# Patient Record
Sex: FEMALE | Race: WHITE | Employment: OTHER | URBAN - METROPOLITAN AREA
[De-identification: names, ages, dates, MRNs, and addresses within clinical notes are randomized per-mention and may not be internally consistent; named-entity substitution may affect disease eponyms.]

---

## 2019-09-23 ENCOUNTER — TRANSFERRED RECORDS (OUTPATIENT)
Dept: HEALTH INFORMATION MANAGEMENT | Facility: CLINIC | Age: 44
End: 2019-09-23

## 2019-09-30 ENCOUNTER — HOSPITAL ENCOUNTER (INPATIENT)
Facility: CLINIC | Age: 44
LOS: 2 days | Discharge: HOME | DRG: 025 | End: 2019-10-03
Attending: NEUROLOGICAL SURGERY | Admitting: NEUROLOGICAL SURGERY

## 2019-09-30 ENCOUNTER — TRANSFERRED RECORDS (OUTPATIENT)
Dept: HEALTH INFORMATION MANAGEMENT | Facility: CLINIC | Age: 44
End: 2019-09-30

## 2019-09-30 DIAGNOSIS — D49.6 BRAIN TUMOR (H): Primary | ICD-10-CM

## 2019-09-30 LAB
ALT SERPL-CCNC: 10 U/L
AST SERPL-CCNC: 16 U/L (ref 14–36)
CREAT SERPL-MCNC: 0.77 MG/DL (ref 0.52–1.04)
GFR SERPL CREATININE-BSD FRML MDRD: >60 ML/MIN/1.73M2
GLUCOSE BLDC GLUCOMTR-MCNC: 133 MG/DL (ref 70–99)
GLUCOSE SERPL-MCNC: 109 MG/DL (ref 75–100)
INR PPP: 1.1 (ref 0.9–1.1)
MRSA DNA SPEC QL NAA+PROBE: NEGATIVE
POTASSIUM SERPL-SCNC: 3.9 MMOL/L (ref 3.5–5.1)
SPECIMEN SOURCE: NORMAL
TSH SERPL-ACNC: 1.38 UIU/ML (ref 0.47–4.68)

## 2019-09-30 PROCEDURE — 87640 STAPH A DNA AMP PROBE: CPT | Performed by: NEUROLOGICAL SURGERY

## 2019-09-30 PROCEDURE — 93010 ELECTROCARDIOGRAM REPORT: CPT | Performed by: INTERNAL MEDICINE

## 2019-09-30 PROCEDURE — 00000146 ZZHCL STATISTIC GLUCOSE BY METER IP

## 2019-09-30 PROCEDURE — 93005 ELECTROCARDIOGRAM TRACING: CPT

## 2019-09-30 PROCEDURE — 87641 MR-STAPH DNA AMP PROBE: CPT | Performed by: NEUROLOGICAL SURGERY

## 2019-09-30 ASSESSMENT — PAIN DESCRIPTION - DESCRIPTORS: DESCRIPTORS: HEADACHE

## 2019-09-30 ASSESSMENT — ACTIVITIES OF DAILY LIVING (ADL)
BATHING: 0-->INDEPENDENT
DRESS: 0-->INDEPENDENT
SWALLOWING: 0-->SWALLOWS FOODS/LIQUIDS WITHOUT DIFFICULTY
RETIRED_COMMUNICATION: 0-->UNDERSTANDS/COMMUNICATES WITHOUT DIFFICULTY
TOILETING: 0-->INDEPENDENT
COGNITION: 0 - NO COGNITION ISSUES REPORTED
FALL_HISTORY_WITHIN_LAST_SIX_MONTHS: YES
RETIRED_EATING: 0-->INDEPENDENT
AMBULATION: 0-->INDEPENDENT
NUMBER_OF_TIMES_PATIENT_HAS_FALLEN_WITHIN_LAST_SIX_MONTHS: 3
TRANSFERRING: 0-->INDEPENDENT

## 2019-10-01 ENCOUNTER — APPOINTMENT (OUTPATIENT)
Dept: CT IMAGING | Facility: CLINIC | Age: 44
DRG: 025 | End: 2019-10-01
Attending: NEUROLOGICAL SURGERY

## 2019-10-01 ENCOUNTER — ANESTHESIA (OUTPATIENT)
Dept: SURGERY | Facility: CLINIC | Age: 44
DRG: 025 | End: 2019-10-01

## 2019-10-01 ENCOUNTER — ANESTHESIA EVENT (OUTPATIENT)
Dept: SURGERY | Facility: CLINIC | Age: 44
DRG: 025 | End: 2019-10-01

## 2019-10-01 ENCOUNTER — APPOINTMENT (OUTPATIENT)
Dept: MRI IMAGING | Facility: CLINIC | Age: 44
DRG: 025 | End: 2019-10-01
Attending: NEUROLOGICAL SURGERY

## 2019-10-01 PROBLEM — D49.6 BRAIN TUMOR (H): Status: ACTIVE | Noted: 2019-10-01

## 2019-10-01 LAB
ABO + RH BLD: NORMAL
ABO + RH BLD: NORMAL
ALBUMIN SERPL-MCNC: 3.6 G/DL (ref 3.4–5)
ALP SERPL-CCNC: 72 U/L (ref 40–150)
ALT SERPL W P-5'-P-CCNC: 11 U/L (ref 0–50)
ANION GAP SERPL CALCULATED.3IONS-SCNC: 12 MMOL/L (ref 3–14)
APTT PPP: 27 SEC (ref 22–37)
AST SERPL W P-5'-P-CCNC: 7 U/L (ref 0–45)
BILIRUB SERPL-MCNC: 0.4 MG/DL (ref 0.2–1.3)
BLD GP AB SCN SERPL QL: NORMAL
BLOOD BANK CMNT PATIENT-IMP: NORMAL
BUN SERPL-MCNC: 17 MG/DL (ref 7–30)
CALCIUM SERPL-MCNC: 10.1 MG/DL (ref 8.5–10.1)
CHLORIDE SERPL-SCNC: 107 MMOL/L (ref 94–109)
CO2 SERPL-SCNC: 20 MMOL/L (ref 20–32)
CREAT SERPL-MCNC: 0.64 MG/DL (ref 0.52–1.04)
ERYTHROCYTE [DISTWIDTH] IN BLOOD BY AUTOMATED COUNT: 14.7 % (ref 10–15)
FIBRINOGEN PPP-MCNC: 352 MG/DL (ref 200–420)
GFR SERPL CREATININE-BSD FRML MDRD: >90 ML/MIN/{1.73_M2}
GLUCOSE BLDC GLUCOMTR-MCNC: 122 MG/DL (ref 70–99)
GLUCOSE SERPL-MCNC: 109 MG/DL (ref 70–99)
HCG UR QL: NEGATIVE
HCT VFR BLD AUTO: 36 % (ref 35–47)
HGB BLD-MCNC: 11.6 G/DL (ref 11.7–15.7)
INR PPP: 1.15 (ref 0.86–1.14)
INTERPRETATION ECG - MUSE: NORMAL
MCH RBC QN AUTO: 26.2 PG (ref 26.5–33)
MCHC RBC AUTO-ENTMCNC: 32.2 G/DL (ref 31.5–36.5)
MCV RBC AUTO: 81 FL (ref 78–100)
PLATELET # BLD AUTO: 274 10E9/L (ref 150–450)
POTASSIUM SERPL-SCNC: 4.2 MMOL/L (ref 3.4–5.3)
PROT SERPL-MCNC: 7.4 G/DL (ref 6.8–8.8)
RBC # BLD AUTO: 4.43 10E12/L (ref 3.8–5.2)
SODIUM SERPL-SCNC: 139 MMOL/L (ref 133–144)
SPECIMEN EXP DATE BLD: NORMAL
WBC # BLD AUTO: 8.5 10E9/L (ref 4–11)

## 2019-10-01 PROCEDURE — 85610 PROTHROMBIN TIME: CPT | Performed by: STUDENT IN AN ORGANIZED HEALTH CARE EDUCATION/TRAINING PROGRAM

## 2019-10-01 PROCEDURE — 40000014 ZZH STATISTIC ARTERIAL MONITORING DAILY

## 2019-10-01 PROCEDURE — 25000565 ZZH ISOFLURANE, EA 15 MIN: Performed by: NEUROLOGICAL SURGERY

## 2019-10-01 PROCEDURE — C9113 INJ PANTOPRAZOLE SODIUM, VIA: HCPCS | Performed by: STUDENT IN AN ORGANIZED HEALTH CARE EDUCATION/TRAINING PROGRAM

## 2019-10-01 PROCEDURE — 25800030 ZZH RX IP 258 OP 636: Performed by: NURSE ANESTHETIST, CERTIFIED REGISTERED

## 2019-10-01 PROCEDURE — A9585 GADOBUTROL INJECTION: HCPCS | Performed by: NEUROLOGICAL SURGERY

## 2019-10-01 PROCEDURE — 86850 RBC ANTIBODY SCREEN: CPT | Performed by: STUDENT IN AN ORGANIZED HEALTH CARE EDUCATION/TRAINING PROGRAM

## 2019-10-01 PROCEDURE — 85027 COMPLETE CBC AUTOMATED: CPT | Performed by: STUDENT IN AN ORGANIZED HEALTH CARE EDUCATION/TRAINING PROGRAM

## 2019-10-01 PROCEDURE — 00B00ZZ EXCISION OF BRAIN, OPEN APPROACH: ICD-10-PCS | Performed by: NEUROLOGICAL SURGERY

## 2019-10-01 PROCEDURE — 86901 BLOOD TYPING SEROLOGIC RH(D): CPT | Performed by: STUDENT IN AN ORGANIZED HEALTH CARE EDUCATION/TRAINING PROGRAM

## 2019-10-01 PROCEDURE — 25000125 ZZHC RX 250: Performed by: STUDENT IN AN ORGANIZED HEALTH CARE EDUCATION/TRAINING PROGRAM

## 2019-10-01 PROCEDURE — 25000125 ZZHC RX 250: Performed by: NURSE ANESTHETIST, CERTIFIED REGISTERED

## 2019-10-01 PROCEDURE — 37000009 ZZH ANESTHESIA TECHNICAL FEE, EACH ADDTL 15 MIN: Performed by: NEUROLOGICAL SURGERY

## 2019-10-01 PROCEDURE — 37000008 ZZH ANESTHESIA TECHNICAL FEE, 1ST 30 MIN: Performed by: NEUROLOGICAL SURGERY

## 2019-10-01 PROCEDURE — 88307 TISSUE EXAM BY PATHOLOGIST: CPT | Performed by: NEUROLOGICAL SURGERY

## 2019-10-01 PROCEDURE — 71000016 ZZH RECOVERY PHASE 1 LEVEL 3 FIRST HR: Performed by: NEUROLOGICAL SURGERY

## 2019-10-01 PROCEDURE — 85730 THROMBOPLASTIN TIME PARTIAL: CPT | Performed by: STUDENT IN AN ORGANIZED HEALTH CARE EDUCATION/TRAINING PROGRAM

## 2019-10-01 PROCEDURE — 71000017 ZZH RECOVERY PHASE 1 LEVEL 3 EA ADDTL HR: Performed by: NEUROLOGICAL SURGERY

## 2019-10-01 PROCEDURE — 25000128 H RX IP 250 OP 636: Performed by: NURSE ANESTHETIST, CERTIFIED REGISTERED

## 2019-10-01 PROCEDURE — 25000132 ZZH RX MED GY IP 250 OP 250 PS 637: Performed by: NURSE ANESTHETIST, CERTIFIED REGISTERED

## 2019-10-01 PROCEDURE — 36000074 ZZH SURGERY LEVEL 6 1ST 30 MIN - UMMC: Performed by: NEUROLOGICAL SURGERY

## 2019-10-01 PROCEDURE — 20000004 ZZH R&B ICU UMMC

## 2019-10-01 PROCEDURE — 25800030 ZZH RX IP 258 OP 636: Performed by: ANESTHESIOLOGY

## 2019-10-01 PROCEDURE — 25800030 ZZH RX IP 258 OP 636: Performed by: STUDENT IN AN ORGANIZED HEALTH CARE EDUCATION/TRAINING PROGRAM

## 2019-10-01 PROCEDURE — 25000128 H RX IP 250 OP 636: Performed by: NEUROLOGICAL SURGERY

## 2019-10-01 PROCEDURE — 27210995 ZZH RX 272: Performed by: NEUROLOGICAL SURGERY

## 2019-10-01 PROCEDURE — 81025 URINE PREGNANCY TEST: CPT | Performed by: STUDENT IN AN ORGANIZED HEALTH CARE EDUCATION/TRAINING PROGRAM

## 2019-10-01 PROCEDURE — 40000556 ZZH STATISTIC PERIPHERAL IV START W US GUIDANCE

## 2019-10-01 PROCEDURE — 25000128 H RX IP 250 OP 636: Performed by: STUDENT IN AN ORGANIZED HEALTH CARE EDUCATION/TRAINING PROGRAM

## 2019-10-01 PROCEDURE — 27810169 ZZH OR IMPLANT GENERAL: Performed by: NEUROLOGICAL SURGERY

## 2019-10-01 PROCEDURE — 74177 CT ABD & PELVIS W/CONTRAST: CPT

## 2019-10-01 PROCEDURE — 70450 CT HEAD/BRAIN W/O DYE: CPT

## 2019-10-01 PROCEDURE — 88342 IMHCHEM/IMCYTCHM 1ST ANTB: CPT | Performed by: NEUROLOGICAL SURGERY

## 2019-10-01 PROCEDURE — 00000146 ZZHCL STATISTIC GLUCOSE BY METER IP

## 2019-10-01 PROCEDURE — 25800030 ZZH RX IP 258 OP 636: Performed by: NEUROLOGICAL SURGERY

## 2019-10-01 PROCEDURE — 00000159 ZZHCL STATISTIC H-SEND OUTS PREP: Performed by: NEUROLOGICAL SURGERY

## 2019-10-01 PROCEDURE — 25000125 ZZHC RX 250: Performed by: NEUROLOGICAL SURGERY

## 2019-10-01 PROCEDURE — 36000076 ZZH SURGERY LEVEL 6 EA 15 ADDTL MIN - UMMC: Performed by: NEUROLOGICAL SURGERY

## 2019-10-01 PROCEDURE — 25000128 H RX IP 250 OP 636: Performed by: ANESTHESIOLOGY

## 2019-10-01 PROCEDURE — 85384 FIBRINOGEN ACTIVITY: CPT | Performed by: STUDENT IN AN ORGANIZED HEALTH CARE EDUCATION/TRAINING PROGRAM

## 2019-10-01 PROCEDURE — 00900ZZ DRAINAGE OF BRAIN, OPEN APPROACH: ICD-10-PCS | Performed by: NEUROLOGICAL SURGERY

## 2019-10-01 PROCEDURE — C1713 ANCHOR/SCREW BN/BN,TIS/BN: HCPCS | Performed by: NEUROLOGICAL SURGERY

## 2019-10-01 PROCEDURE — 27210794 ZZH OR GENERAL SUPPLY STERILE: Performed by: NEUROLOGICAL SURGERY

## 2019-10-01 PROCEDURE — 71260 CT THORAX DX C+: CPT

## 2019-10-01 PROCEDURE — 25500064 ZZH RX 255 OP 636: Performed by: NEUROLOGICAL SURGERY

## 2019-10-01 PROCEDURE — 88341 IMHCHEM/IMCYTCHM EA ADD ANTB: CPT | Performed by: NEUROLOGICAL SURGERY

## 2019-10-01 PROCEDURE — 40000170 ZZH STATISTIC PRE-PROCEDURE ASSESSMENT II: Performed by: NEUROLOGICAL SURGERY

## 2019-10-01 PROCEDURE — 88331 PATH CONSLTJ SURG 1 BLK 1SPC: CPT | Performed by: NEUROLOGICAL SURGERY

## 2019-10-01 PROCEDURE — 86900 BLOOD TYPING SEROLOGIC ABO: CPT | Performed by: STUDENT IN AN ORGANIZED HEALTH CARE EDUCATION/TRAINING PROGRAM

## 2019-10-01 PROCEDURE — 70552 MRI BRAIN STEM W/DYE: CPT

## 2019-10-01 PROCEDURE — 36415 COLL VENOUS BLD VENIPUNCTURE: CPT | Performed by: STUDENT IN AN ORGANIZED HEALTH CARE EDUCATION/TRAINING PROGRAM

## 2019-10-01 PROCEDURE — 80053 COMPREHEN METABOLIC PANEL: CPT | Performed by: STUDENT IN AN ORGANIZED HEALTH CARE EDUCATION/TRAINING PROGRAM

## 2019-10-01 PROCEDURE — C1763 CONN TISS, NON-HUMAN: HCPCS | Performed by: NEUROLOGICAL SURGERY

## 2019-10-01 PROCEDURE — 40000275 ZZH STATISTIC RCP TIME EA 10 MIN

## 2019-10-01 DEVICE — IMP PLATE SYN STR DOG BONE LOW PROFILE 2H TI 421.502: Type: IMPLANTABLE DEVICE | Site: BRAIN | Status: FUNCTIONAL

## 2019-10-01 DEVICE — GRAFT DURAGEN 3X3" ID330: Type: IMPLANTABLE DEVICE | Site: BRAIN | Status: FUNCTIONAL

## 2019-10-01 DEVICE — IMP BUR HOLE COVER 24MM LOW PROFILE TI 421.528: Type: IMPLANTABLE DEVICE | Site: BRAIN | Status: FUNCTIONAL

## 2019-10-01 DEVICE — IMP SCR SYN MATRIX LOW PRO 1.5X04MM SELF DRILL 04.503.104.01: Type: IMPLANTABLE DEVICE | Site: BRAIN | Status: FUNCTIONAL

## 2019-10-01 RX ORDER — METOCLOPRAMIDE 5 MG/1
10 TABLET ORAL EVERY 6 HOURS PRN
Status: DISCONTINUED | OUTPATIENT
Start: 2019-10-01 | End: 2019-10-03 | Stop reason: HOSPADM

## 2019-10-01 RX ORDER — AMOXICILLIN 250 MG
2 CAPSULE ORAL 2 TIMES DAILY
Status: DISCONTINUED | OUTPATIENT
Start: 2019-10-01 | End: 2019-10-03 | Stop reason: HOSPADM

## 2019-10-01 RX ORDER — SODIUM CHLORIDE, SODIUM LACTATE, POTASSIUM CHLORIDE, CALCIUM CHLORIDE 600; 310; 30; 20 MG/100ML; MG/100ML; MG/100ML; MG/100ML
INJECTION, SOLUTION INTRAVENOUS CONTINUOUS PRN
Status: DISCONTINUED | OUTPATIENT
Start: 2019-10-01 | End: 2019-10-01

## 2019-10-01 RX ORDER — ACETAMINOPHEN 325 MG/1
975 TABLET ORAL EVERY 8 HOURS
Status: DISCONTINUED | OUTPATIENT
Start: 2019-10-01 | End: 2019-10-03 | Stop reason: HOSPADM

## 2019-10-01 RX ORDER — HYDRALAZINE HYDROCHLORIDE 20 MG/ML
10-20 INJECTION INTRAMUSCULAR; INTRAVENOUS EVERY 30 MIN PRN
Status: DISCONTINUED | OUTPATIENT
Start: 2019-10-01 | End: 2019-10-02

## 2019-10-01 RX ORDER — PROCHLORPERAZINE 25 MG
25 SUPPOSITORY, RECTAL RECTAL EVERY 12 HOURS PRN
Status: DISCONTINUED | OUTPATIENT
Start: 2019-10-01 | End: 2019-10-03 | Stop reason: HOSPADM

## 2019-10-01 RX ORDER — FENTANYL CITRATE 50 UG/ML
25-50 INJECTION, SOLUTION INTRAMUSCULAR; INTRAVENOUS EVERY 5 MIN PRN
Status: DISCONTINUED | OUTPATIENT
Start: 2019-10-01 | End: 2019-10-02 | Stop reason: HOSPADM

## 2019-10-01 RX ORDER — CEFAZOLIN SODIUM 1 G/3ML
INJECTION, POWDER, FOR SOLUTION INTRAMUSCULAR; INTRAVENOUS PRN
Status: DISCONTINUED | OUTPATIENT
Start: 2019-10-01 | End: 2019-10-01

## 2019-10-01 RX ORDER — DEXAMETHASONE SODIUM PHOSPHATE 4 MG/ML
4 INJECTION, SOLUTION INTRA-ARTICULAR; INTRALESIONAL; INTRAMUSCULAR; INTRAVENOUS; SOFT TISSUE EVERY 8 HOURS
Status: DISCONTINUED | OUTPATIENT
Start: 2019-10-01 | End: 2019-10-03 | Stop reason: HOSPADM

## 2019-10-01 RX ORDER — ONDANSETRON 4 MG/1
4 TABLET, ORALLY DISINTEGRATING ORAL EVERY 30 MIN PRN
Status: DISCONTINUED | OUTPATIENT
Start: 2019-10-01 | End: 2019-10-02 | Stop reason: HOSPADM

## 2019-10-01 RX ORDER — POTASSIUM CHLORIDE 29.8 MG/ML
20 INJECTION INTRAVENOUS
Status: DISCONTINUED | OUTPATIENT
Start: 2019-10-01 | End: 2019-10-03 | Stop reason: HOSPADM

## 2019-10-01 RX ORDER — ONDANSETRON 4 MG/1
4 TABLET, ORALLY DISINTEGRATING ORAL EVERY 6 HOURS PRN
Status: DISCONTINUED | OUTPATIENT
Start: 2019-10-01 | End: 2019-10-03 | Stop reason: HOSPADM

## 2019-10-01 RX ORDER — ONDANSETRON 2 MG/ML
4 INJECTION INTRAMUSCULAR; INTRAVENOUS EVERY 6 HOURS PRN
Status: DISCONTINUED | OUTPATIENT
Start: 2019-10-01 | End: 2019-10-03 | Stop reason: HOSPADM

## 2019-10-01 RX ORDER — POTASSIUM CL/LIDO/0.9 % NACL 10MEQ/0.1L
10 INTRAVENOUS SOLUTION, PIGGYBACK (ML) INTRAVENOUS
Status: DISCONTINUED | OUTPATIENT
Start: 2019-10-01 | End: 2019-10-03 | Stop reason: HOSPADM

## 2019-10-01 RX ORDER — AMOXICILLIN 250 MG
1 CAPSULE ORAL 2 TIMES DAILY
Status: DISCONTINUED | OUTPATIENT
Start: 2019-10-01 | End: 2019-10-03 | Stop reason: HOSPADM

## 2019-10-01 RX ORDER — SODIUM CHLORIDE 9 MG/ML
INJECTION, SOLUTION INTRAVENOUS CONTINUOUS
Status: ACTIVE | OUTPATIENT
Start: 2019-10-01 | End: 2019-10-01

## 2019-10-01 RX ORDER — ESMOLOL HYDROCHLORIDE 10 MG/ML
INJECTION INTRAVENOUS PRN
Status: DISCONTINUED | OUTPATIENT
Start: 2019-10-01 | End: 2019-10-01

## 2019-10-01 RX ORDER — LABETALOL 20 MG/4 ML (5 MG/ML) INTRAVENOUS SYRINGE
10-40 EVERY 10 MIN PRN
Status: DISCONTINUED | OUTPATIENT
Start: 2019-10-01 | End: 2019-10-02

## 2019-10-01 RX ORDER — FENTANYL CITRATE 50 UG/ML
INJECTION, SOLUTION INTRAMUSCULAR; INTRAVENOUS PRN
Status: DISCONTINUED | OUTPATIENT
Start: 2019-10-01 | End: 2019-10-01

## 2019-10-01 RX ORDER — IOPAMIDOL 755 MG/ML
88 INJECTION, SOLUTION INTRAVASCULAR ONCE
Status: COMPLETED | OUTPATIENT
Start: 2019-10-01 | End: 2019-10-01

## 2019-10-01 RX ORDER — ACETAMINOPHEN 325 MG/1
650 TABLET ORAL EVERY 4 HOURS PRN
Status: DISCONTINUED | OUTPATIENT
Start: 2019-10-04 | End: 2019-10-03 | Stop reason: HOSPADM

## 2019-10-01 RX ORDER — OXYCODONE HYDROCHLORIDE 5 MG/1
5-10 TABLET ORAL
Status: DISCONTINUED | OUTPATIENT
Start: 2019-10-01 | End: 2019-10-03 | Stop reason: HOSPADM

## 2019-10-01 RX ORDER — LIDOCAINE 40 MG/G
CREAM TOPICAL
Status: DISCONTINUED | OUTPATIENT
Start: 2019-10-01 | End: 2019-10-03 | Stop reason: HOSPADM

## 2019-10-01 RX ORDER — MAGNESIUM SULFATE HEPTAHYDRATE 40 MG/ML
4 INJECTION, SOLUTION INTRAVENOUS EVERY 4 HOURS PRN
Status: DISCONTINUED | OUTPATIENT
Start: 2019-10-01 | End: 2019-10-03 | Stop reason: HOSPADM

## 2019-10-01 RX ORDER — HYDROMORPHONE HYDROCHLORIDE 1 MG/ML
.3-.5 INJECTION, SOLUTION INTRAMUSCULAR; INTRAVENOUS; SUBCUTANEOUS EVERY 10 MIN PRN
Status: DISCONTINUED | OUTPATIENT
Start: 2019-10-01 | End: 2019-10-02 | Stop reason: HOSPADM

## 2019-10-01 RX ORDER — MEPERIDINE HYDROCHLORIDE 25 MG/ML
12.5 INJECTION INTRAMUSCULAR; INTRAVENOUS; SUBCUTANEOUS
Status: DISCONTINUED | OUTPATIENT
Start: 2019-10-01 | End: 2019-10-02 | Stop reason: HOSPADM

## 2019-10-01 RX ORDER — NALOXONE HYDROCHLORIDE 0.4 MG/ML
.1-.4 INJECTION, SOLUTION INTRAMUSCULAR; INTRAVENOUS; SUBCUTANEOUS
Status: DISCONTINUED | OUTPATIENT
Start: 2019-10-01 | End: 2019-10-02 | Stop reason: HOSPADM

## 2019-10-01 RX ORDER — MANNITOL 20 G/100ML
INJECTION, SOLUTION INTRAVENOUS PRN
Status: DISCONTINUED | OUTPATIENT
Start: 2019-10-01 | End: 2019-10-01

## 2019-10-01 RX ORDER — POTASSIUM CHLORIDE 1.5 G/1.58G
20-40 POWDER, FOR SOLUTION ORAL
Status: DISCONTINUED | OUTPATIENT
Start: 2019-10-01 | End: 2019-10-03 | Stop reason: HOSPADM

## 2019-10-01 RX ORDER — METOCLOPRAMIDE HYDROCHLORIDE 5 MG/ML
10 INJECTION INTRAMUSCULAR; INTRAVENOUS EVERY 6 HOURS PRN
Status: DISCONTINUED | OUTPATIENT
Start: 2019-10-01 | End: 2019-10-03 | Stop reason: HOSPADM

## 2019-10-01 RX ORDER — LEVETIRACETAM 10 MG/ML
1000 INJECTION INTRAVASCULAR ONCE
Status: COMPLETED | OUTPATIENT
Start: 2019-10-01 | End: 2019-10-01

## 2019-10-01 RX ORDER — ONDANSETRON 2 MG/ML
4 INJECTION INTRAMUSCULAR; INTRAVENOUS EVERY 30 MIN PRN
Status: DISCONTINUED | OUTPATIENT
Start: 2019-10-01 | End: 2019-10-02 | Stop reason: HOSPADM

## 2019-10-01 RX ORDER — CEFAZOLIN SODIUM 2 G/100ML
2 INJECTION, SOLUTION INTRAVENOUS
Status: COMPLETED | OUTPATIENT
Start: 2019-10-01 | End: 2019-10-01

## 2019-10-01 RX ORDER — GLYCOPYRROLATE 0.2 MG/ML
INJECTION, SOLUTION INTRAMUSCULAR; INTRAVENOUS PRN
Status: DISCONTINUED | OUTPATIENT
Start: 2019-10-01 | End: 2019-10-01

## 2019-10-01 RX ORDER — EPHEDRINE SULFATE 50 MG/ML
INJECTION, SOLUTION INTRAMUSCULAR; INTRAVENOUS; SUBCUTANEOUS PRN
Status: DISCONTINUED | OUTPATIENT
Start: 2019-10-01 | End: 2019-10-01

## 2019-10-01 RX ORDER — PROPOFOL 10 MG/ML
INJECTION, EMULSION INTRAVENOUS PRN
Status: DISCONTINUED | OUTPATIENT
Start: 2019-10-01 | End: 2019-10-01

## 2019-10-01 RX ORDER — LIDOCAINE HYDROCHLORIDE 20 MG/ML
INJECTION, SOLUTION INFILTRATION; PERINEURAL PRN
Status: DISCONTINUED | OUTPATIENT
Start: 2019-10-01 | End: 2019-10-01

## 2019-10-01 RX ORDER — POTASSIUM CHLORIDE 7.45 MG/ML
10 INJECTION INTRAVENOUS
Status: DISCONTINUED | OUTPATIENT
Start: 2019-10-01 | End: 2019-10-03 | Stop reason: HOSPADM

## 2019-10-01 RX ORDER — GADOBUTROL 604.72 MG/ML
7.5 INJECTION INTRAVENOUS ONCE
Status: COMPLETED | OUTPATIENT
Start: 2019-10-01 | End: 2019-10-01

## 2019-10-01 RX ORDER — NALOXONE HYDROCHLORIDE 0.4 MG/ML
.1-.4 INJECTION, SOLUTION INTRAMUSCULAR; INTRAVENOUS; SUBCUTANEOUS
Status: DISCONTINUED | OUTPATIENT
Start: 2019-10-01 | End: 2019-10-03 | Stop reason: HOSPADM

## 2019-10-01 RX ORDER — SODIUM CHLORIDE 9 MG/ML
INJECTION, SOLUTION INTRAVENOUS CONTINUOUS
Status: DISCONTINUED | OUTPATIENT
Start: 2019-10-01 | End: 2019-10-03 | Stop reason: HOSPADM

## 2019-10-01 RX ORDER — SODIUM CHLORIDE, SODIUM LACTATE, POTASSIUM CHLORIDE, CALCIUM CHLORIDE 600; 310; 30; 20 MG/100ML; MG/100ML; MG/100ML; MG/100ML
INJECTION, SOLUTION INTRAVENOUS CONTINUOUS
Status: DISCONTINUED | OUTPATIENT
Start: 2019-10-01 | End: 2019-10-01

## 2019-10-01 RX ORDER — BUPIVACAINE HYDROCHLORIDE AND EPINEPHRINE 2.5; 5 MG/ML; UG/ML
INJECTION, SOLUTION INFILTRATION; PERINEURAL PRN
Status: DISCONTINUED | OUTPATIENT
Start: 2019-10-01 | End: 2019-10-02 | Stop reason: HOSPADM

## 2019-10-01 RX ORDER — DEXAMETHASONE SODIUM PHOSPHATE 4 MG/ML
INJECTION, SOLUTION INTRA-ARTICULAR; INTRALESIONAL; INTRAMUSCULAR; INTRAVENOUS; SOFT TISSUE PRN
Status: DISCONTINUED | OUTPATIENT
Start: 2019-10-01 | End: 2019-10-01

## 2019-10-01 RX ORDER — DEXAMETHASONE 4 MG/1
4 TABLET ORAL EVERY 8 HOURS
Status: DISCONTINUED | OUTPATIENT
Start: 2019-10-01 | End: 2019-10-03 | Stop reason: HOSPADM

## 2019-10-01 RX ORDER — CEFAZOLIN SODIUM 1 G/3ML
1 INJECTION, POWDER, FOR SOLUTION INTRAMUSCULAR; INTRAVENOUS SEE ADMIN INSTRUCTIONS
Status: DISCONTINUED | OUTPATIENT
Start: 2019-10-01 | End: 2019-10-01 | Stop reason: HOSPADM

## 2019-10-01 RX ORDER — PROCHLORPERAZINE MALEATE 10 MG
10 TABLET ORAL EVERY 6 HOURS PRN
Status: DISCONTINUED | OUTPATIENT
Start: 2019-10-01 | End: 2019-10-03 | Stop reason: HOSPADM

## 2019-10-01 RX ORDER — POTASSIUM CHLORIDE 750 MG/1
20-40 TABLET, EXTENDED RELEASE ORAL
Status: DISCONTINUED | OUTPATIENT
Start: 2019-10-01 | End: 2019-10-03 | Stop reason: HOSPADM

## 2019-10-01 RX ORDER — ONDANSETRON 2 MG/ML
INJECTION INTRAMUSCULAR; INTRAVENOUS PRN
Status: DISCONTINUED | OUTPATIENT
Start: 2019-10-01 | End: 2019-10-01

## 2019-10-01 RX ADMIN — FENTANYL CITRATE 100 MCG: 50 INJECTION, SOLUTION INTRAMUSCULAR; INTRAVENOUS at 17:51

## 2019-10-01 RX ADMIN — GADOBUTROL 7.5 ML: 604.72 INJECTION INTRAVENOUS at 09:23

## 2019-10-01 RX ADMIN — LEVETIRACETAM 1 G: 10 INJECTION INTRAVENOUS at 18:30

## 2019-10-01 RX ADMIN — SODIUM CHLORIDE, POTASSIUM CHLORIDE, SODIUM LACTATE AND CALCIUM CHLORIDE: 600; 310; 30; 20 INJECTION, SOLUTION INTRAVENOUS at 17:30

## 2019-10-01 RX ADMIN — ROCURONIUM BROMIDE 50 MG: 10 INJECTION INTRAVENOUS at 17:51

## 2019-10-01 RX ADMIN — SODIUM CHLORIDE, POTASSIUM CHLORIDE, SODIUM LACTATE AND CALCIUM CHLORIDE: 600; 310; 30; 20 INJECTION, SOLUTION INTRAVENOUS at 22:15

## 2019-10-01 RX ADMIN — HYDROMORPHONE HYDROCHLORIDE 0.5 MG: 1 INJECTION, SOLUTION INTRAMUSCULAR; INTRAVENOUS; SUBCUTANEOUS at 23:54

## 2019-10-01 RX ADMIN — ONDANSETRON 4 MG: 2 INJECTION INTRAMUSCULAR; INTRAVENOUS at 20:51

## 2019-10-01 RX ADMIN — GLYCOPYRROLATE 0.2 MG: 0.2 INJECTION, SOLUTION INTRAMUSCULAR; INTRAVENOUS at 17:52

## 2019-10-01 RX ADMIN — PHENYLEPHRINE HYDROCHLORIDE 200 MCG: 10 INJECTION INTRAVENOUS at 18:27

## 2019-10-01 RX ADMIN — FENTANYL CITRATE 100 MCG: 50 INJECTION, SOLUTION INTRAMUSCULAR; INTRAVENOUS at 18:42

## 2019-10-01 RX ADMIN — HYDROMORPHONE HYDROCHLORIDE 0.25 MG: 1 INJECTION, SOLUTION INTRAMUSCULAR; INTRAVENOUS; SUBCUTANEOUS at 21:14

## 2019-10-01 RX ADMIN — PHENYLEPHRINE HYDROCHLORIDE 50 MCG: 10 INJECTION INTRAVENOUS at 20:19

## 2019-10-01 RX ADMIN — PHENYLEPHRINE HYDROCHLORIDE 200 MCG: 10 INJECTION INTRAVENOUS at 18:52

## 2019-10-01 RX ADMIN — AMINOLEVULINIC ACID HYDROCHLORIDE 1311 MG: 1500 POWDER, FOR SOLUTION ORAL at 16:00

## 2019-10-01 RX ADMIN — CEFAZOLIN 1 G: 1 INJECTION, POWDER, FOR SOLUTION INTRAMUSCULAR; INTRAVENOUS at 21:03

## 2019-10-01 RX ADMIN — DEXAMETHASONE SODIUM PHOSPHATE 10 MG: 4 INJECTION, SOLUTION INTRA-ARTICULAR; INTRALESIONAL; INTRAMUSCULAR; INTRAVENOUS; SOFT TISSUE at 18:35

## 2019-10-01 RX ADMIN — FENTANYL CITRATE 100 MCG: 50 INJECTION, SOLUTION INTRAMUSCULAR; INTRAVENOUS at 18:40

## 2019-10-01 RX ADMIN — SODIUM CHLORIDE: 9 INJECTION, SOLUTION INTRAVENOUS at 03:47

## 2019-10-01 RX ADMIN — MANNITOL 65 G: 20 INJECTION, SOLUTION INTRAVENOUS at 18:28

## 2019-10-01 RX ADMIN — PANTOPRAZOLE SODIUM 40 MG: 40 INJECTION, POWDER, LYOPHILIZED, FOR SOLUTION INTRAVENOUS at 07:43

## 2019-10-01 RX ADMIN — PROPOFOL 50 MG: 10 INJECTION, EMULSION INTRAVENOUS at 18:18

## 2019-10-01 RX ADMIN — ROCURONIUM BROMIDE 50 MG: 10 INJECTION INTRAVENOUS at 18:40

## 2019-10-01 RX ADMIN — SODIUM CHLORIDE, POTASSIUM CHLORIDE, SODIUM LACTATE AND CALCIUM CHLORIDE: 600; 310; 30; 20 INJECTION, SOLUTION INTRAVENOUS at 19:00

## 2019-10-01 RX ADMIN — HYDROMORPHONE HYDROCHLORIDE 0.25 MG: 1 INJECTION, SOLUTION INTRAMUSCULAR; INTRAVENOUS; SUBCUTANEOUS at 21:05

## 2019-10-01 RX ADMIN — LIDOCAINE HYDROCHLORIDE 100 MG: 20 INJECTION, SOLUTION INFILTRATION; PERINEURAL at 17:51

## 2019-10-01 RX ADMIN — PHENYLEPHRINE HYDROCHLORIDE 0.5 MCG/KG/MIN: 10 INJECTION INTRAVENOUS at 19:25

## 2019-10-01 RX ADMIN — SUGAMMADEX 140 MG: 100 INJECTION, SOLUTION INTRAVENOUS at 21:31

## 2019-10-01 RX ADMIN — FENTANYL CITRATE 50 MCG: 50 INJECTION, SOLUTION INTRAMUSCULAR; INTRAVENOUS at 18:44

## 2019-10-01 RX ADMIN — DEXAMETHASONE SODIUM PHOSPHATE 4 MG: 4 INJECTION, SOLUTION INTRAMUSCULAR; INTRAVENOUS at 10:46

## 2019-10-01 RX ADMIN — ROCURONIUM BROMIDE 10 MG: 10 INJECTION INTRAVENOUS at 19:52

## 2019-10-01 RX ADMIN — FENTANYL CITRATE 100 MCG: 50 INJECTION, SOLUTION INTRAMUSCULAR; INTRAVENOUS at 18:18

## 2019-10-01 RX ADMIN — Medication 10 MG: at 18:06

## 2019-10-01 RX ADMIN — ESMOLOL HYDROCHLORIDE 20 MG: 10 INJECTION, SOLUTION INTRAVENOUS at 21:26

## 2019-10-01 RX ADMIN — IOPAMIDOL 88 ML: 755 INJECTION, SOLUTION INTRAVENOUS at 09:59

## 2019-10-01 RX ADMIN — PROPOFOL 90 MG: 10 INJECTION, EMULSION INTRAVENOUS at 17:51

## 2019-10-01 RX ADMIN — PHENYLEPHRINE HYDROCHLORIDE 200 MCG: 10 INJECTION INTRAVENOUS at 19:12

## 2019-10-01 RX ADMIN — ESMOLOL HYDROCHLORIDE 20 MG: 10 INJECTION, SOLUTION INTRAVENOUS at 21:21

## 2019-10-01 RX ADMIN — LEVETIRACETAM 1 G: 10 INJECTION INTRAVENOUS at 20:26

## 2019-10-01 RX ADMIN — FENTANYL CITRATE 50 MCG: 50 INJECTION, SOLUTION INTRAMUSCULAR; INTRAVENOUS at 17:40

## 2019-10-01 RX ADMIN — ROCURONIUM BROMIDE 10 MG: 10 INJECTION INTRAVENOUS at 20:21

## 2019-10-01 RX ADMIN — ESMOLOL HYDROCHLORIDE 20 MG: 10 INJECTION, SOLUTION INTRAVENOUS at 21:37

## 2019-10-01 RX ADMIN — POLYETHYLENE GLYCOL 400 AND PROPYLENE GLYCOL 2 DROP: 4; 3 SOLUTION/ DROPS OPHTHALMIC at 17:51

## 2019-10-01 RX ADMIN — CEFAZOLIN SODIUM 2 G: 2 INJECTION, SOLUTION INTRAVENOUS at 18:25

## 2019-10-01 RX ADMIN — MIDAZOLAM 2 MG: 1 INJECTION INTRAMUSCULAR; INTRAVENOUS at 18:44

## 2019-10-01 RX ADMIN — DEXAMETHASONE SODIUM PHOSPHATE 4 MG: 4 INJECTION, SOLUTION INTRAMUSCULAR; INTRAVENOUS at 01:40

## 2019-10-01 RX ADMIN — ESMOLOL HYDROCHLORIDE 20 MG: 10 INJECTION, SOLUTION INTRAVENOUS at 21:32

## 2019-10-01 ASSESSMENT — MIFFLIN-ST. JEOR: SCORE: 1353.5

## 2019-10-01 ASSESSMENT — ACTIVITIES OF DAILY LIVING (ADL)
ADLS_ACUITY_SCORE: 12

## 2019-10-01 ASSESSMENT — PAIN DESCRIPTION - DESCRIPTORS
DESCRIPTORS: HEADACHE
DESCRIPTORS: HEADACHE

## 2019-10-01 NOTE — PROVIDER NOTIFICATION
Neurosurgery resident notified that nursing staff continues to await orders. Discussed plan of care with MD and states okay to do neuro checks every 2 hours. Notified resident that pt is having some disorientation/difficulty word finding. Provider states okay to monitor for now.

## 2019-10-01 NOTE — PLAN OF CARE
4A - Plan for craniotomy later today, will HOLD PT evaluation and re-assess pending surgical intervention.

## 2019-10-01 NOTE — PLAN OF CARE
[id number]:  Neuro: Intermittently disoriented to time vs some word-finding difficulty. Sometimes uses wrong words or will repeat back phrases used by RN. Otherwise neurologically intact. PERRL, 3-4. AMES with equal strength. Some dizziness on standing. Mild frontal headache, declines intervention.  CV: Bradycardic in 40s at rest. EKG completed, sinus bradycardia. Pressures stable.  Resp: RA, lungs clear.  GI: NPO since arrival in anticipation of surgery. BGs < 140.  : Voiding spontaneously and without difficulty.  Skin: Intact, aside from IV sites.  Lines: PIV x2; NS at 75mL/hr.  Mobility: Up with SBA. Calling appropriately.  Plan: CT and MRI today with likely L frontal craniotomy later today. Monitor neuro status closely and notify provider of changes or concerns.

## 2019-10-01 NOTE — ANESTHESIA PREPROCEDURE EVALUATION
Anesthesia Pre-Procedure Evaluation    Patient: Africa Dempsey   MRN:     [id number] Gender:   female   Age:    44 year old :      1975        Preoperative Diagnosis: Brain mass [G93.89]   Procedure(s):  Stealth Assisted Left Frontal Cranitomy for Tumor Resection     No past medical history on file.   No past surgical history on file.       Anesthesia Evaluation     . Pt has had prior anesthetic.            ROS/MED HX    ENT/Pulmonary:  - neg pulmonary ROS     Neurologic: Comment: Large lobulated peripherally enhancing septated mass occupying the anterior left frontal lobe measuring 5.3 x 3.7 x 4.6 cm with surrounding vasogenic edema, with mass effect with partial effacement of the left lateral ventricle. Rightward midline shift measuring 9 mm.       Cardiovascular:  - neg cardiovascular ROS       METS/Exercise Tolerance:     Hematologic:  - neg hematologic  ROS       Musculoskeletal:  - neg musculoskeletal ROS       GI/Hepatic:  - neg GI/hepatic ROS       Renal/Genitourinary:  - ROS Renal section negative       Endo:  - neg endo ROS       Psychiatric:  - neg psychiatric ROS       Infectious Disease:  - neg infectious disease ROS       Malignancy:      - no malignancy   Other:    - neg other ROS                 [facility] AN PHYSICAL EXAM    LABS:  CBC:   Lab Results   Component Value Date    WBC 8.5 10/01/2019    HGB 11.6 (L) 10/01/2019    HCT 36.0 10/01/2019     10/01/2019     BMP:   Lab Results   Component Value Date     10/01/2019    POTASSIUM 4.2 10/01/2019    CHLORIDE 107 10/01/2019    CO2 20 10/01/2019    BUN 17 10/01/2019    CR 0.64 10/01/2019     (H) 10/01/2019     COAGS:   Lab Results   Component Value Date    PTT 27 10/01/2019    INR 1.15 (H) 10/01/2019    FIBR 352 10/01/2019     POC:   Lab Results   Component Value Date     (H) 2019     OTHER:   Lab Results   Component Value Date    HANH 10.1 10/01/2019    ALBUMIN 3.6 10/01/2019    PROTTOTAL 7.4 10/01/2019    ALT 11  "10/01/2019    AST 7 10/01/2019    ALKPHOS 72 10/01/2019    BILITOTAL 0.4 10/01/2019        Preop Vitals    BP Readings from Last 3 Encounters:   10/01/19 112/66    Pulse Readings from Last 3 Encounters:   10/01/19 (!) 47      Resp Readings from Last 3 Encounters:   10/01/19 16    SpO2 Readings from Last 3 Encounters:   10/01/19 98%      Temp Readings from Last 1 Encounters:   10/01/19 36.4  C (97.6  F) (Oral)    Ht Readings from Last 1 Encounters:   10/01/19 1.727 m (5' 8\")      Wt Readings from Last 1 Encounters:   10/01/19 65.5 kg (144 lb 6.4 oz)    Estimated body mass index is 21.96 kg/m  as calculated from the following:    Height as of this encounter: 1.727 m (5' 8\").    Weight as of this encounter: 65.5 kg (144 lb 6.4 oz).     LDA:  Peripheral IV Right Upper forearm (Active)   Site Assessment WDL 10/1/2019  8:00 AM   Line Status Infusing 10/1/2019 12:00 PM   Phlebitis Scale 0-->no symptoms 10/1/2019 12:00 PM   Infiltration Scale 0 10/1/2019 12:00 PM   Infiltration Site Treatment Method  None 10/1/2019 12:00 PM   Extravasation? No 10/1/2019 12:00 PM   Number of days:        Peripheral IV 10/01/19 Left Lower forearm (Active)   Site Assessment WDL except;Painful 10/1/2019 12:00 PM   Line Status Saline locked;Checked every 1-2 hour 10/1/2019 12:00 PM   Phlebitis Scale 0-->no symptoms 10/1/2019 12:00 PM   Infiltration Scale 0 10/1/2019 12:00 PM   Infiltration Site Treatment Method  None 10/1/2019 12:00 PM   Extravasation? No 10/1/2019 12:00 PM   Dressing Intervention New dressing  10/1/2019  6:54 AM   Number of days: 0        Assessment:   ASA SCORE: 2    H&P: History and physical reviewed and following examination; no interval change.   Smoking Status:  Non-Smoker/Unknown   NPO Status: NPO Appropriate     Plan:   Anes. Type:  General   Pre-Medication: None   Induction:  IV (Standard)   Airway: ETT; Oral   Access/Monitoring: PIV; 2nd PIV; A-Line   Maintenance: Balanced     Postop Plan:   Postop Pain: " Opioids  Postop Sedation/Airway: Not planned  Disposition: Inpatient/Admit     PONV Management:   Adult Risk Factors: Female, Non-Smoker, Postop Opioids   Prevention: Ondansetron, Dexamethasone     CONSENT: Direct conversation   Plan and risks discussed with: Patient   Blood Products: Consented (ALL Blood Products)                   Lan Vazquez MD

## 2019-10-01 NOTE — PROGRESS NOTES
S: ready for surgery    O:  Exam:  General: Awake;  Alert, In No Acute Distress  Pulm: Breathing Comfortably on room air  Mental status: Oriented x 3, word-finding difficulty  Cranial Nerves: Cranial Nerves II-XII Intact Bilaterally  Strength:      Del Tr Bi WE WF Gr  R 5 5 5 5 5 5  L 5 5 5 5 5 5     HF KE KF DF PF   R 5 5 5 5 5   L 5 5 5 5 5     Pronator Drift: Absent  Sensory: Intact to Light Touch  Reflexes: No Hyperreflexia, Hunt s or Clonus Present; Toes Down-Going Bilaterally  INCISION: n/a    Assessment:   44 year old-year-old female with large left frontal cystic septated brain mass and correlating headache and  word-finding difficulty.    Plan:     Neuro:   Anti-epileptics: none  Cerebral Edema: Decadron 4 mg q8h  Drains: none  Required imaging: Completed stealth CT and MRI  Added on for OR 10/1 for tumor resection    Cardiovascular: blood pressure goals: SBP < 140    Pulmonary: Incentive spirometry     Gastrointestinal: NPO    Renal: no issues     Heme:  No issues   Maintain INR < 1.4; Platelet > 100K; Fibrinogen > 200, Hemoglobin > 8    Endocrine: No issues    Infectious: Monitor for fever    PT/OT: pending    DVT prophylaxis: Sequential compression devices    Ulcer prophylaxis: protonix     DISPO:  4A    Barriers: evaluation by therapies, ambulating, pain controlled on PO medications, surgery    Avni Lassiter MD  Neurosurgery Resident PGY2    Please contact neurosurgery resident on call with questions.    Dial * * [phone], enter [phone] when prompted.

## 2019-10-01 NOTE — PLAN OF CARE
Neuro - See flowsheet for exam.  Exam intact other than mild word finding difficulty.  In OR for crany with tumor resection.  Left unit at 1500.    Cardiac - SB, no ectopy.  Afebrile.  Pulses 2+.  No edema.    Resp - Room air, clear over dim.    GI - NPO.  Bowels active, no BM.  Abdomen flat, soft, non tender.     - Voids.    Skin - No issues noted.

## 2019-10-01 NOTE — H&P
[facility]       NEUROSURGERY H&P NOTE    Reason for Consultation  Left frontal brain mass    HPI:  Ms. Dempsey is a 44-year-old woman with no past medical history (not currently taking any medications) who presents as direct admission to the ICU following approximately 3 weeks of midline headache 6-7/10 intensity and progressive word-finding difficulty.  Upon presentation to the outside hospital, the patient underwent brain MRI, which demonstrated a large left frontal rim-enhancing septated brain mass with significant edema resulting in rightward subfalcine herniation.  Given this finding, the patient's physician then had an [id number] level discussion with Dr. Sandip Yo, who recommended direct admission to the [address] ICU for evaluation and surgical planning.    At the time of evaluation, the patient primarily complains of headache.  She demonstrates occasional difficulty with word-finding but is able to identify and self-correct errors.    PAST MEDICAL HISTORY: No past medical history on file.    PAST SURGICAL HISTORY: No past surgical history on file.    FAMILY HISTORY: No family history on file.    SOCIAL HISTORY:   Social History     Tobacco Use     Smoking status: Not on file   Substance Use Topics     Alcohol use: Not on file       MEDICATIONS:  No current outpatient medications on file.       Allergies:  Allergies not on file    ROS: 10 point ROS of systems including Constitutional, Eyes, Respiratory, Cardiovascular, Gastroenterology, Genitourinary, Integumentary, Muscularskeletal, Psychiatric were all negative except for pertinent positives noted in my HPI.    Physical exam:   There were no vitals taken for this visit.  CV: RRR, no murmurs, rubs, or gallops  PULM: breathing comfortably on room air  ABD: soft, non-distended  NEUROLOGIC:  -- Awake; Alert; oriented x 3  -- Follows commands briskly  -- +repetition, calculation, and naming  -- Speech  fluent, spontaneous. No aphasia or dysarthria.  -- no gaze preference. No apparent hemineglect.  Cranial Nerves:  -- visual fields full to confrontation, PERRL 3mm bilat and brisk, extraocular movements intact  -- face symmetrical, tongue midline  -- sensory V1-V3 intact bilaterally  -- palate elevates symmetrically, uvula midline  -- hearing grossly intact bilat  -- Trapezii 5/5 strength bilat symmetric  -- Cerebellar: Finger nose finger without dysmetria, intact rapid alternating motions bilaterally    Motor:  Normal bulk / tone; no tremor, rigidity, or bradykinesia.  No muscle wasting or fasciculations  No Pronator Drift     Delt Bi Tri Hand Flexion/  Extension Iliopsoas Quadriceps Hamstrings Tibialis Anterior Gastroc    C5 C6 C7 C8/T1 L2 L3 L4-S1 L4 S1   R 5 5 5 5 5 5 5 5 5   L 5 5 5 5 5 5 5 5 5   Sensory:  intact to LT x 4 extremities     Reflexes:     Bi Tri BR Arturo Pat Ach Bab    C5-6 C7-8 C6 UMN L2-4 S1 UMN   R 2+ 2+ 2+ Norm 2+ 2+ Norm   L 2+ 2+ 2+ Norm 2+ 2+ Norm     IMAGING:  MRI brain with and without contrast 9/30/2019:  Large 6x5x4 rim-enhancing cystic septated mass in the left frontal pole.    LABS:   No results found for: WBC  No results found for: RBC  No results found for: HGB  No results found for: HCT  No results found for: MCV  No results found for: MCH  No results found for: MCHC  No results found for: RDW  No results found for: PLT    Last Comprehensive Metabolic Panel:  No results found for: NA, POTASSIUM, CHLORIDE, CO2, ANIONGAP, GLC, BUN, CR, GFRESTIMATED, HANH    No results found for: INR   No results found for: PTT     ASSESSMENT:  44 year old-year-old female with large left frontal cystic septated brain mass and correlating headache and  word-finding difficulty.    RECOMMENDATIONS:  - Plan for addon craniotomy for surgical resection of left frontal brain mass  - CT chest/abdomen/pelvis with contrast to evaluate for possible primary tumor  - Anesthesia preoperative evaluation  - Requires  ICU level observation at this time  - Avoid sedating medications that would alter a neurological examination    The patient was discussed with Dr. Steele, neurosurgery chief resident, and he agrees with the above.    Chris Trevino M.D.  Neurosurgery Resident, PGY-2    Please contact neurosurgery resident on call with questions.    Dial * * [phone], enter [phone] when prompted.       Neurosurgery Faculty Note    Patient seen and examined. Please see Dr. Trevino' note for details. In brief, this is a 44 y.o. F who presented with word finding difficulties attributable to a 6 x 5 x 4 cm left frontal lesion with significant mass effect. Examination is notable for paraphasic errors and naming difficulties. MRI showed significant FLAIR abnormality surrounding the left frontal lesion. I believe that surgical resection would allow definitive tissue diagnosis and decompression of the mass effect. I have spoken to the patient and her  in terms of the procedure. Risks and benefits were reviewed. All questions were answered. Will proceed with surgical resection.    I have spent 65 minutes today, with the majority of the visit counseling the patient on the diagnosis. All questions were answered. Over 50% of my time on the unit was spent counseling the patient and coordinating care regarding the surgical resection.

## 2019-10-01 NOTE — CONSULTS
"Neuroscience Intensive Care Consult    HPI: Africa Dempsey is a 44-year-old female with h/o anemia admitted to Neuro ICU on 2019 as a transfer from [place] ED, where she presented for persistent daily midline headaches x3 weeks and was noted to have a large, mulitloculated, cystic L anterior frontal lobe mass with surrounding vasogenic edema and rightward subfalcine herniation.  Pt states that she has fallen 3x over the last 1-2 weeks, although she does not remember the circumstances around her actual falls, only that her legs \"give out.\"  Pt and her  also note recent personality changes.  She has been more withdrawn with flatter affect. Current headache is somewhat improved following Decadron admin x2, but remains 6/10 in severity and located @ midline.  She denies any n/v, fevers/chills, visual changes, weakness, numbness, tingling.  She has had some word-finding difficulties and confusion since arriving on the floor, but otherwise denies any changes in cognition. She denies any recent infections or exposures.      ROS:   10 point ROS of systems is negative except for pertinent positives noted in HPI    Past Medical/Surgical History:  Anemia  Menorrhagia  No history of surgeries    Family History:  No family history of neurologic disease.  Pt's mother has hx of psychiatric illness.  Father with history of heart dz.    Social History:  No tobacco, alcohol, or drug use.  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  She eats a healthy diet and exercises regularly.        Objective Data:    24 Hour Vital Signs Summary:  Temperatures:  Current - Temp: 97.6  F (36.4  C); Max - Temp  Av  F (36.7  C)  Min: 97.6  F (36.4  C)  Max: 98.2  F (36.8  C)  Respiration range: Resp  Avg: 15.1  Min: 14  Max: 16  Pulse range: Pulse  Av.9  Min: 42  Max: 54  Blood pressure range: Systolic (24hrs), Av , Min:100 , Max:128   ; Diastolic (24hrs), Av, Min:51, Max:77    Pulse oximetry range: SpO2  Av %  Min: 97 %  " "Max: 100 %    Ventilator Settings  Resp: 16      Intake/Output Summary (Last 24 hours) at 10/1/2019 0801  Last data filed at 10/1/2019 0700  Gross per 24 hour   Intake 341.25 ml   Output --   Net 341.25 ml       BP - Mean:  [70-95] 85    Current Medications:    acetaminophen  975 mg Oral Q8H     ceFAZolin  1 g Intravenous See Admin Instructions     ceFAZolin  2 g Intravenous Pre-Op/Pre-procedure x 1 dose     dexamethasone  4 mg Intravenous Q8H    Or     dexamethasone  4 mg Oral Q8H     pantoprazole (PROTONIX) IV  40 mg Intravenous Daily     senna-docusate  1 tablet Oral BID    Or     senna-docusate  2 tablet Oral BID     sodium chloride (PF)  3 mL Intracatheter Q8H       PRN Medications:  [START ON 10/4/2019] acetaminophen, hydrALAZINE, labetalol, lidocaine 4%, lidocaine (buffered or not buffered), magnesium sulfate, magnesium sulfate, metoclopramide **OR** metoclopramide, naloxone, ondansetron **OR** ondansetron, oxyCODONE, potassium chloride, potassium chloride with lidocaine, potassium chloride, potassium chloride, potassium chloride, potassium phosphate (KPHOS) in D5W IV, potassium phosphate (KPHOS) in D5W IV, potassium phosphate (KPHOS) in D5W IV, potassium phosphate (KPHOS) in D5W IV, potassium phosphate (KPHOS) in D5W IV, prochlorperazine **OR** prochlorperazine **OR** prochlorperazine, sodium chloride (PF)    Infusions:    sodium chloride       sodium chloride 75 mL/hr at 10/01/19 0700       Allergies   Allergen Reactions     Amoxicillin Hives       Physical Examination:  /66 (BP Location: Right arm)   Pulse (!) 42   Temp 97.6  F (36.4  C) (Oral)   Resp 16   Ht 1.727 m (5' 8\")   Wt 65.5 kg (144 lb 6.4 oz)   SpO2 98%   BMI 21.96 kg/m      GEN: Pt lying in bed, no acute distress, pleasant with somewhat flattened affect  HEENT: normocephalic, atraumatic, MMM  CV: RRR  PULM: breathing comfortably on room air  ABD: soft, nontender, nondistended  SKIN: warm, dry, no visible rashes or lesions    NEURO " EXAM:  Mental status: Awake, alert and oriented x3. Follows commands without hesitation.  Repetition and naming intact.  Fluent spontaneous speech with some brief word-finding difficulties which pt is aware of and quick to correct.  No observed aphasia.  CN: No gaze preference.  Visual fields full with intact peripheral vision.  Hearing grossly intact bilaterally.  PERRL 3 mm bilaterally and brisk.  EOMI.  Symmetric face with midline tongue.  V1-V3 sensation intact bilaterally.  Palate elevate symmetrically with midline uvula.  Hearing grossly intact bilaterally.  Normal and symmetric bilateral strength in trapezii.  Motor: Normal bulk and tone.  No tremor.  5/5 strength in upper and lower extremities bilaterally.  No pronator drift.  Sensation: Light touch sensation intact and symmetric bilaterally in upper and lower extremities.  Reflexes: Bilateral 2+ patellar, biceps, and triceps reflexes.  Coordination: Finger-nose-finger without dysmetria, intact rapid alternating movements bilaterally.  Station/Gait: Not assessed.    Labs/Studies:  Recent Labs   Lab Test 10/01/19  0131      POTASSIUM 4.2   CHLORIDE 107   CO2 20   ANIONGAP 12   *   BUN 17   CR 0.64   HANH 10.1   WBC 8.5   RBC 4.43   HGB 11.6*          Recent Labs   Lab Test 10/01/19  0131   INR 1.15*   PTT 27           Imaging:  MRI Brain with Contrast, 10/1/2019:  Findings: Limited imaging for stereotactic imaging. There is a large  lobulated peripherally enhancing septated mass occupying the anterior  left frontal lobe measuring 5.3 x 3.7 x 4.6 cm. There is surrounding  vasogenic edema, with mass effect with partial effacement of the left  lateral ventricle. Rightward midline shift measuring 9 mm.      No abnormality of the skull marrow signal is noted. Tiny enhancing  nodular focus along the falx 5 x 3 mm. Visualized portions of the  paranasal sinuses and mastoid air cells are clear.                                                                        Impression: Limited imaging primarily for the purposes of stereotactic  localization. Large peripherally enhancing and multiloculated cystic  mass in the left frontal lobe. There is adjacent vasogenic edema, mass  effect, and 9 mm of rightward midline shift.      CT Chest/Abd/Pelvis w/ Contrast, 10/1/2019:  FINDINGS:  Chest:   Coarse calcification associated with a 4 mm nodule in the posterior  right thyroid lobe. The aortic branching pattern, heart size,  pericardium, and esophagus appear normal. The ascending aorta and main  pulmonary artery are not enlarged. No large central pulmonary  embolism. No thoracic adenopathy.     The central tracheobronchial tree is patent. There are several small  paratracheal air cysts along the posterior aspect of the upper  trachea. No pneumothorax or pleural effusion. No airspace  consolidation. Mild bibasilar atelectasis. Solid 3 mm nodule in the  right apex (series 4, image 71). Mild biapical fibrosis.     Abdomen and pelvis:   Indeterminate 9 mm hypodensity along the medial aspect of segments 5/6  (series 3, image 353). There may be a peripheral nodular area of  enhancement along the medial lesion, series 3 image 354 versus  adjacent passing vessel. The gallbladder, spleen, pancreas, adrenal  glands, and kidneys appear normal. Intravenous contrast opacifies the  renal collecting systems and ureters which restricts evaluation for  any calculi. Questionable arcuate morphology of the uterus which  otherwise appears normal. Unremarkable appearance of the adnexal  structures.     No intra-abdominal free air. Trace pelvic free fluid, within  physiologic limits. No abnormally dilated loops of bowel. The appendix  is normal. Normal caliber abdominal aorta. The major abdominal  vasculature is patent. No lymphadenopathy in the abdomen or pelvis.     Bones and soft tissues:   Unremarkable CT appearance of the breast tissues. Incomplete posterior  fusion of S1. 6 mm lucent  focus in the T1 vertebral body. Scattered  less conspicuous and smaller lucencies in the posterior iliacs and  remainder of the spine.     IMPRESSION:   1. Indeterminate hypodensity in the right hepatic lobe, possibly a  cyst or hemangioma. Consider further evaluation with MRI.   2. Heterogeneous bone marrow throughout with scattered lucencies,  possibly representing benign metabolic process although hematologic  malignancies or metastases can have a similar appearance. Consider  further evaluation with contrast enhanced spine MR.  3. Solid 3 mm nodule in the right apex. Attention on follow-up.      CT Head w/o Contrast, 10/1/2019:  Findings:   Large multiloculated mass in the left frontal lobe, better  characterized on the same the MRI. There is surrounding edema, with  mass effect and subfalcine herniation, and 9 mm of rightward midline  shift. There is partial effacement of left lateral ventricle. No  intracranial hemorrhage or abnormal extra-axial fluid collection. Bony  calvarium and the bones and skull base are intact. Polypoid mucosal  thickening of the left maxillary sinus. The remainder of the paranasal  sinuses and the mastoid air cells are clear. The orbits are  unremarkable.                                                                   Impression:   1. Limited imaging performed primarily for the purposes of  stereotactic localization.   2. Large mass occupying the left frontal lobe, better characterized on  a same-day MRI. There is surrounding vasogenic edema, mass effect with  subfalcine herniation, and 9 mm of rightward midline shift.      Assessment:  Africa Dempsey is a 44-year-old female with unremarkable PMH, transferred from [place], [place] ED for management of large left frontal, cystic, septated brain mass with associated vasogenic edema and rightward subfalcine herniation.  Pt presenting with correlating headache and word-finding difficulties but otherwise is stable.      Plan:  Neuro:  # Left  frontal brain mass  - Neurosurgery is primary managing team  - Pt to OR for craniotomy and surgical resection of left frontal brain mass  - Decadron 4 mg q8hr  - PT/OT postoperatively  - Other management will be determined following surgical management today    Resp:   - No acute issues    CV:   - SBP goal <140  - hydralazine PRN, labetalol PRN    Renal:  - Electrolyte replacement protocol PRN    Endo:  - No acute issues    Heme:   -Transfuse PLT<100, HGB <10    GI:  - Diet: NPO in anticipation of surgery today  - Bowel regimen: PRN    ID:  - No acute issues    PPX:    DVT: SCDs, holding chemoprophylaxis perioperatively    GI: pantoprazole    Code Status: Full code    Dispo: 4A ICU due to critical neuro condition    This patient was seen and discussed with attending neurologist, Dr Sumaya Rose, MS4  [employer] [facility] [employer]    Resident/Fellow Attestation   I, April Phillips, was present with the medical student who participated in the service and in the documentation of the note.  I have verified the history and personally performed the physical exam and medical decision making.  I agree with the assessment and plan of care as documented in the note.        April Phillips MD  PGY2  Date of Service (when I saw the patient): 10/01/19

## 2019-10-01 NOTE — H&P
Admitted/transferred from: [facility]   Reason for admission/transfer: Brain tumor  Patient status upon admission/transfer: Stable. A&Ox4, Able to make needs known. HR sinus bradycardia. BPs stable. On room air.   Interventions: Monitor neuro status  Plan: To be determined by Neurosurgery team  2 RN skin assessment: completed by Keturah Taylor and Heather Castro  Result of skin assessment and interventions/actions: No skin issues noted.   Height, weight, drug calc weight: done  Patient belongings: Cell phone, pants, shoes, glasses  MDRO education (if applicable):

## 2019-10-02 ENCOUNTER — APPOINTMENT (OUTPATIENT)
Dept: MRI IMAGING | Facility: CLINIC | Age: 44
DRG: 025 | End: 2019-10-02
Attending: NURSE PRACTITIONER

## 2019-10-02 ENCOUNTER — APPOINTMENT (OUTPATIENT)
Dept: PHYSICAL THERAPY | Facility: CLINIC | Age: 44
DRG: 025 | End: 2019-10-02
Attending: NEUROLOGICAL SURGERY

## 2019-10-02 ENCOUNTER — APPOINTMENT (OUTPATIENT)
Dept: SPEECH THERAPY | Facility: CLINIC | Age: 44
DRG: 025 | End: 2019-10-02
Attending: NEUROLOGICAL SURGERY

## 2019-10-02 ENCOUNTER — APPOINTMENT (OUTPATIENT)
Dept: OCCUPATIONAL THERAPY | Facility: CLINIC | Age: 44
DRG: 025 | End: 2019-10-02
Attending: NEUROLOGICAL SURGERY

## 2019-10-02 ENCOUNTER — APPOINTMENT (OUTPATIENT)
Dept: CT IMAGING | Facility: CLINIC | Age: 44
DRG: 025 | End: 2019-10-02
Attending: NEUROLOGICAL SURGERY

## 2019-10-02 LAB
ANION GAP SERPL CALCULATED.3IONS-SCNC: 8 MMOL/L (ref 3–14)
BUN SERPL-MCNC: 22 MG/DL (ref 7–30)
CALCIUM SERPL-MCNC: 9.8 MG/DL (ref 8.5–10.1)
CHLORIDE SERPL-SCNC: 106 MMOL/L (ref 94–109)
CO2 SERPL-SCNC: 24 MMOL/L (ref 20–32)
CREAT SERPL-MCNC: 0.78 MG/DL (ref 0.52–1.04)
ERYTHROCYTE [DISTWIDTH] IN BLOOD BY AUTOMATED COUNT: 15.5 % (ref 10–15)
GFR SERPL CREATININE-BSD FRML MDRD: >90 ML/MIN/{1.73_M2}
GLUCOSE BLDC GLUCOMTR-MCNC: 112 MG/DL (ref 70–99)
GLUCOSE BLDC GLUCOMTR-MCNC: 127 MG/DL (ref 70–99)
GLUCOSE BLDC GLUCOMTR-MCNC: 132 MG/DL (ref 70–99)
GLUCOSE SERPL-MCNC: 118 MG/DL (ref 70–99)
HCT VFR BLD AUTO: 32.4 % (ref 35–47)
HGB BLD-MCNC: 10 G/DL (ref 11.7–15.7)
MAGNESIUM SERPL-MCNC: 2.2 MG/DL (ref 1.6–2.3)
MCH RBC QN AUTO: 25.6 PG (ref 26.5–33)
MCHC RBC AUTO-ENTMCNC: 30.9 G/DL (ref 31.5–36.5)
MCV RBC AUTO: 83 FL (ref 78–100)
PLATELET # BLD AUTO: 258 10E9/L (ref 150–450)
POTASSIUM SERPL-SCNC: 4.4 MMOL/L (ref 3.4–5.3)
RBC # BLD AUTO: 3.9 10E12/L (ref 3.8–5.2)
SODIUM SERPL-SCNC: 138 MMOL/L (ref 133–144)
WBC # BLD AUTO: 16.4 10E9/L (ref 4–11)

## 2019-10-02 PROCEDURE — 70450 CT HEAD/BRAIN W/O DYE: CPT

## 2019-10-02 PROCEDURE — 00000146 ZZHCL STATISTIC GLUCOSE BY METER IP

## 2019-10-02 PROCEDURE — 25800030 ZZH RX IP 258 OP 636: Performed by: STUDENT IN AN ORGANIZED HEALTH CARE EDUCATION/TRAINING PROGRAM

## 2019-10-02 PROCEDURE — 97535 SELF CARE MNGMENT TRAINING: CPT | Mod: GO | Performed by: OCCUPATIONAL THERAPIST

## 2019-10-02 PROCEDURE — 70553 MRI BRAIN STEM W/O & W/DYE: CPT

## 2019-10-02 PROCEDURE — 25000128 H RX IP 250 OP 636: Performed by: STUDENT IN AN ORGANIZED HEALTH CARE EDUCATION/TRAINING PROGRAM

## 2019-10-02 PROCEDURE — 97165 OT EVAL LOW COMPLEX 30 MIN: CPT | Mod: GO | Performed by: OCCUPATIONAL THERAPIST

## 2019-10-02 PROCEDURE — A9585 GADOBUTROL INJECTION: HCPCS | Performed by: NEUROLOGICAL SURGERY

## 2019-10-02 PROCEDURE — 97116 GAIT TRAINING THERAPY: CPT | Mod: GP | Performed by: PHYSICAL THERAPIST

## 2019-10-02 PROCEDURE — 25000132 ZZH RX MED GY IP 250 OP 250 PS 637: Performed by: STUDENT IN AN ORGANIZED HEALTH CARE EDUCATION/TRAINING PROGRAM

## 2019-10-02 PROCEDURE — 40000014 ZZH STATISTIC ARTERIAL MONITORING DAILY

## 2019-10-02 PROCEDURE — 40000275 ZZH STATISTIC RCP TIME EA 10 MIN

## 2019-10-02 PROCEDURE — 25500064 ZZH RX 255 OP 636: Performed by: NEUROLOGICAL SURGERY

## 2019-10-02 PROCEDURE — 25000132 ZZH RX MED GY IP 250 OP 250 PS 637: Performed by: NEUROLOGICAL SURGERY

## 2019-10-02 PROCEDURE — 83735 ASSAY OF MAGNESIUM: CPT | Performed by: STUDENT IN AN ORGANIZED HEALTH CARE EDUCATION/TRAINING PROGRAM

## 2019-10-02 PROCEDURE — 12000001 ZZH R&B MED SURG/OB UMMC

## 2019-10-02 PROCEDURE — 85027 COMPLETE CBC AUTOMATED: CPT | Performed by: STUDENT IN AN ORGANIZED HEALTH CARE EDUCATION/TRAINING PROGRAM

## 2019-10-02 PROCEDURE — 97112 NEUROMUSCULAR REEDUCATION: CPT | Mod: GP | Performed by: PHYSICAL THERAPIST

## 2019-10-02 PROCEDURE — 25000131 ZZH RX MED GY IP 250 OP 636 PS 637: Performed by: STUDENT IN AN ORGANIZED HEALTH CARE EDUCATION/TRAINING PROGRAM

## 2019-10-02 PROCEDURE — 25000132 ZZH RX MED GY IP 250 OP 250 PS 637: Performed by: NURSE PRACTITIONER

## 2019-10-02 PROCEDURE — 80048 BASIC METABOLIC PNL TOTAL CA: CPT | Performed by: STUDENT IN AN ORGANIZED HEALTH CARE EDUCATION/TRAINING PROGRAM

## 2019-10-02 PROCEDURE — 92507 TX SP LANG VOICE COMM INDIV: CPT | Mod: GN

## 2019-10-02 PROCEDURE — 92523 SPEECH SOUND LANG COMPREHEN: CPT | Mod: GN

## 2019-10-02 PROCEDURE — 97530 THERAPEUTIC ACTIVITIES: CPT | Mod: GP | Performed by: PHYSICAL THERAPIST

## 2019-10-02 PROCEDURE — 97161 PT EVAL LOW COMPLEX 20 MIN: CPT | Mod: GP | Performed by: PHYSICAL THERAPIST

## 2019-10-02 RX ORDER — HYDRALAZINE HYDROCHLORIDE 20 MG/ML
10-20 INJECTION INTRAMUSCULAR; INTRAVENOUS EVERY 30 MIN PRN
Status: DISCONTINUED | OUTPATIENT
Start: 2019-10-02 | End: 2019-10-03 | Stop reason: HOSPADM

## 2019-10-02 RX ORDER — GADOBUTROL 604.72 MG/ML
7.5 INJECTION INTRAVENOUS ONCE
Status: COMPLETED | OUTPATIENT
Start: 2019-10-02 | End: 2019-10-02

## 2019-10-02 RX ORDER — LEVETIRACETAM 500 MG/1
1000 TABLET ORAL 2 TIMES DAILY
Status: DISCONTINUED | OUTPATIENT
Start: 2019-10-02 | End: 2019-10-03 | Stop reason: HOSPADM

## 2019-10-02 RX ORDER — LABETALOL 20 MG/4 ML (5 MG/ML) INTRAVENOUS SYRINGE
10-40 EVERY 10 MIN PRN
Status: DISCONTINUED | OUTPATIENT
Start: 2019-10-02 | End: 2019-10-03 | Stop reason: HOSPADM

## 2019-10-02 RX ORDER — PANTOPRAZOLE SODIUM 40 MG/1
40 TABLET, DELAYED RELEASE ORAL
Status: DISCONTINUED | OUTPATIENT
Start: 2019-10-02 | End: 2019-10-03 | Stop reason: HOSPADM

## 2019-10-02 RX ADMIN — DEXAMETHASONE SODIUM PHOSPHATE 4 MG: 4 INJECTION, SOLUTION INTRAMUSCULAR; INTRAVENOUS at 02:26

## 2019-10-02 RX ADMIN — GADOBUTROL 7.5 ML: 604.72 INJECTION INTRAVENOUS at 19:36

## 2019-10-02 RX ADMIN — ACETAMINOPHEN 975 MG: 325 TABLET, FILM COATED ORAL at 10:19

## 2019-10-02 RX ADMIN — SODIUM CHLORIDE: 9 INJECTION, SOLUTION INTRAVENOUS at 00:00

## 2019-10-02 RX ADMIN — DEXAMETHASONE 4 MG: 4 TABLET ORAL at 17:13

## 2019-10-02 RX ADMIN — DEXAMETHASONE 4 MG: 4 TABLET ORAL at 10:19

## 2019-10-02 RX ADMIN — PANTOPRAZOLE SODIUM 40 MG: 40 TABLET, DELAYED RELEASE ORAL at 08:18

## 2019-10-02 RX ADMIN — SENNOSIDES AND DOCUSATE SODIUM 2 TABLET: 8.6; 5 TABLET ORAL at 08:18

## 2019-10-02 RX ADMIN — ACETAMINOPHEN 975 MG: 325 TABLET, FILM COATED ORAL at 17:13

## 2019-10-02 RX ADMIN — ACETAMINOPHEN 975 MG: 325 TABLET, FILM COATED ORAL at 02:21

## 2019-10-02 RX ADMIN — LEVETIRACETAM 1000 MG: 500 TABLET, FILM COATED ORAL at 08:18

## 2019-10-02 RX ADMIN — LEVETIRACETAM 1000 MG: 500 TABLET, FILM COATED ORAL at 19:56

## 2019-10-02 ASSESSMENT — PAIN DESCRIPTION - DESCRIPTORS
DESCRIPTORS: HEADACHE
DESCRIPTORS: HEADACHE

## 2019-10-02 ASSESSMENT — ACTIVITIES OF DAILY LIVING (ADL)
ADLS_ACUITY_SCORE: 14
PREVIOUS_RESPONSIBILITIES: MEAL PREP;HOUSEKEEPING;LAUNDRY;SHOPPING;DRIVING;WORK;CHILD CARE
ADLS_ACUITY_SCORE: 14
ADLS_ACUITY_SCORE: 14
IADL_COMMENTS: FAMILY ABLE TO ASSIST WITH IADLS PRN
ADLS_ACUITY_SCORE: 14
ADLS_ACUITY_SCORE: 12

## 2019-10-02 ASSESSMENT — MIFFLIN-ST. JEOR: SCORE: 1349.5

## 2019-10-02 NOTE — PLAN OF CARE
D=Pt came back from PACU at 0030 S/P craniotomy,pt drowsy but able to follow commands. Pt has difficulty finding words,disoriented to time/place at times.neuro check done every hour unchanged(See neuro flowsheet for details)Heart rate in mid 40s (Baseline before OR)SBP in 130s cuff pressure correlating w/ A line.Head CT done Afebrile.Sutures to head incision intact dry Pt was able to take sips of water no N/V noted  P=Continue to closely monitor pt

## 2019-10-02 NOTE — ANESTHESIA CARE TRANSFER NOTE
Patient: Africa Dempsey    Procedure(s):  Stealth Assisted Left Frontal Cranitomy for Tumor Resection    Diagnosis: Brain mass [G93.89]  Diagnosis Additional Information: No value filed.    Anesthesia Type:   General     Note:  Airway :Face Mask  Patient transferred to:PACU  Comments: Anesthesia Care Transfer Note    Patient: Africa Dempsey    Transferred to: PACU    Patient vital signs: stable    Airway: none    Monitors placed. VSS. PIVs, cari patent. 8L 02 FM. Patient awake, comfortable.     RN unavailable.    Report given and care transferred to RN at 2209  Lula Luis CRNA   10/1/2019  Handoff Report: Identifed the Patient, Identified the Reponsible Provider, Reviewed the pertinent medical history, Discussed the surgical course, Reviewed Intra-OP anesthesia mangement and issues during anesthesia, Set expectations for post-procedure period and Allowed opportunity for questions and acknowledgement of understanding      Vitals: (Last set prior to Anesthesia Care Transfer)    CRNA VITALS  10/1/2019 2118 - 10/1/2019 2156      10/1/2019             Resp Rate (set):  10                Electronically Signed By: GET Moura CRNA  October 1, 2019  9:56 PM

## 2019-10-02 NOTE — OR NURSING
"Pt is neurologically intact except she is still having trouble with word finding and using inappropriate words such as when asked about the name of her , Pt said \"thoracic\" but was able to come up with correct name when asked again. Pt speaks quietly and a little slower paced. Pt follows commands, AMES's, oriented to self,date, and situation but not place. Extremities are strong. Pt c/o h/a \"all over head\" and narcotic given is helping to decrease pain per pt. Vitals stable except HR is in 40's-60's. Pt stable and sent to SICU.  "

## 2019-10-02 NOTE — PROGRESS NOTES
"   10/02/19 1000   General Information, SLP   Type of Evaluation Speech and Language   Type of Visit Initial   Start of Care Date 10/02/19   Onset of Illness/Injury or Date of Surgery - Date 09/30/19   Referring Physician Avni Lassiter MD   Patient/Family Goals Statement Pt wants to be able to communicate more easily   Pertinent History of Current Problem Pt with progressive aphasia. MRI revealed a 5 cm left frontal mass with significant mass effect. The patient was taken to the OR for tumor resection. word finding reportedly improved since surgery, but some difficulty remains   General Observations Eval limited this date due to Pt fatigue   Language: Auditory Comprehension (understanding of spoken language)   Comments (Auditory Comprehension) Pt and  report no difficulty with understanding information or following commands. Formal evaluation not completed this date   Language: Verbal Expression (use of spoken language to express information)   Tests were administered at the following levels Moderate (routine daily activities);Complex (vocation/community/social activities)   Generate Sentences; [place] Test for Differential Diagnosis Of Aphasia (out of 6 total) 2   [place] Naming Test, short form (out of 15 total) 12   Generative Naming Score; Cognitive Linguistic Quick Test 0   Functional Assessment Scale (Verbal Expression) Moderate Impairment   Comments (Verbal Expression) Pt able to participate in naming tasks involving pictures and objects within the room. Significant increase in difficulty noted with naming to description or if asked to describe how an object is used. Pt able to complete simple sentence generation tasks, but only if provided word fit into sentence structure provided by clinician as example. perseverations noted from sentence to sentence. Pt with significant perseverations with attempted generative naming task. When asked to name animals, Pt repeating \"animal coat\", animal have\" (coat " "and have being words provided by clinician in prior sentence generation task). When category narrowed, and Pt asked to name farm animals, repeated \"animal farm\" no animals named throughout despite several cues attempts.    General Therapy Interventions   Planned Therapy Interventions Language   Language Verbal expression   Clinical Impression, SLP Eval   Criteria for Skilled Therapeutic Interventions Met Yes   SLP Diagnosis moderate expressive language deficits   Influenced by the following factors/impairments fatigue; post-op   Functional limitations due to impairments reduced effectiveness/efficiency of communication   Rehab Potential Good, to achieve stated therapy goals   Rehab potential affected by Pt motivated; family present and involved   Therapy Frequency Daily   Predicted Duration of Therapy Intervention (days/wks) 4 weeks   Anticipated Discharge Disposition Home with Outpatient Therapy   Risks and Benefits of Treatment have been explained. Yes   Patient, Family & other staff in agreement with plan of care Yes   Clinical Impression Comments Language evaluation completed per MD order. Pt presents with moderate expressive aphasia. Pt with grossly functional ability to name pictures and objects, but breakdown is noted with attempt to name to description if item not present or describe object function. Pt also with great difficulty with sentence generation and generative naming task. Anticiapte fatigue and post-op swelling currently negatively impacting function. Will plan to follow daily for tx. Anticipate ongoing ST needs to targed expressive language post discharge   Total Evaluation Time      Total Evaluation Time (Minutes) 10     "

## 2019-10-02 NOTE — BRIEF OP NOTE
[facility], [place]    Brief Operative Note    Pre-operative diagnosis: Brain mass [G93.89]  Post-operative diagnosis GBM  Procedure: Procedure(s):  Stealth Assisted Left Frontal Cranitomy for Tumor Resection  Surgeon: Surgeon(s) and Role:     * Sandip Yo MD - Primary     * Harmeet Steele MD - Resident - Assisting  Anesthesia: General   Estimated blood loss: 30 mL  Drains: None  Specimens:   ID Type Source Tests Collected by Time Destination   A : Left tumor- Brain Tissue Brain SURGICAL PATHOLOGY EXAM Sandip Yo MD 10/1/2019  7:10 PM    B : LEFT TUMOR- BRAIN Tissue Brain SURGICAL PATHOLOGY EXAM Sandip Yo MD 10/1/2019  7:59 PM      Findings:   None.  Complications: None.  Implants:    Implant Name Type Inv. Item Serial No.  Lot No. LRB No. Used   IMP PLATE SYN STR DOG BONE LOW PROFILE 2H .502 Metallic Hardware/[place] IMP PLATE SYN STR DOG BONE LOW PROFILE 2H .502  [employer] NONE Left 2   IMP BUR HOLE COVER 24MM LOW PROFILE .528 Metallic Hardware/[place] IMP BUR HOLE COVER 24MM LOW PROFILE .528  [employer] NONE Left 1   IMP SCR SYN MATRIX LOW PRO 1.5X04MM SELF DRILL [phone] Metallic Hardware/[place] IMP SCR SYN MATRIX LOW PRO 1.5X04MM SELF DRILL [phone]  [employer] NONE Left 6   GRAFT DURAGEN 3X3&quot; [id number] Bone/Tissue/Biologic GRAFT DURAGEN 3X3&quot; ID-[id number]  [employer] [id number] Left 1   IMP SCR SYN MATRIX LOW PRO 1.5X04MM SELF DRILL [phone] Metallic Hardware/[place] IMP SCR SYN MATRIX LOW PRO 1.5X04MM SELF DRILL [phone]  [employer] NONE Left 4

## 2019-10-02 NOTE — ANESTHESIA PROCEDURE NOTES
Arterial Line Procedure Note  Staff:     Anesthesiologist:  Husam Caro MD    Resident/CRNA:  Antonio Ramos APRN CRNA    Arterial line performed by resident/CRNA in presence of a teaching physician    Location: In OR After Induction  Procedure Start/Stop Times:     patient identified, IV checked, risks and benefits discussed, informed consent, monitors and equipment checked, pre-op evaluation and at physician/surgeon's request      Correct Patient: Yes      Correct Position: Yes      Correct Site: Yes      Correct Procedure: Yes      Correct Laterality:  N/A    Site Marked:  N/A  Line Placement:     Procedure:  Arterial Line    Insertion Site:  Radial    Insertion laterality:  Right    Skin Prep: Chloraprep      Patient Prep: patient draped, mask, sterile gloves, hat and hand hygiene      Local skin infiltration:  None    Ultrasound Guided?: No      Catheter size:  20 gauge, 12 cm    Cath secured with: suture      Dressing:  Tegaderm    Complications:  None obvious    Arterial waveform: Yes      IBP within 10% of NIBP: Yes

## 2019-10-02 NOTE — PROGRESS NOTES
"N: Drowsy, arouses to voice. Oriented X4, expressive aphasia improving. Able to name objects but difficulty describing them. Pupils equal and reactive, glasses at baseline. Moves all extremities to command with strength, no pronator drift, denies numbness/tingling. Ongoing \"Dull\" headache and occasional dizziness - receiving scheduled tylenol, declines additional intervention. Crani incision - sutured, no drainage, KAR[place]INA. Left periorbital edema.   C: SB/NSR 50-60's, no ectopy. SBP < 140 without intervention. Pulses palpable. Afebrile.   R: RA. Capnography WDL, then DCd as patient refused to wear -  not receiving narcotics. Lungs clear, equal. IS at bedside - initial education complete, self administered.   GI: Tolerating regular diet (organic food from home). Denies nausea or abdominal discomfort. Scheduled senna given.   : Terry out at 1100, has not voided. Bladder scan at 1500, 64cc (6A notified). Menses.   ACCESS: L PIV - saline locked.   ACTIVITY: Independently repositions in bed. SBA with ambulation. Walking in hallways.    PLAN: Due to void. Encourage activity, pulmonary hygiene and diet as tolerated. MRI (checklist sent)  Report called to 6A RN, all questions answered. Patient transferred via wheelchair with belongings.  present for transfer.   "

## 2019-10-02 NOTE — ANESTHESIA POSTPROCEDURE EVALUATION
Anesthesia POST Procedure Evaluation    Patient: Africa Dempsey   MRN:     [id number] Gender:   female   Age:    44 year old :      1975        Preoperative Diagnosis: Brain mass [G93.89]   Procedure(s):  Stealth Assisted Left Frontal Cranitomy for Tumor Resection   Postop Comments: No value filed.       Anesthesia Type:  Not documented  General    Reportable Event: NO     PAIN: Uncomplicated   Sign Out status: Comfortable, Well controlled pain     PONV: No PONV   Sign Out status:  No Nausea or Vomiting     Neuro/Psych: Uneventful perioperative course   Sign Out Status: Preoperative baseline; Age appropriate mentation     Airway/Resp.: Uneventful perioperative course   Sign Out Status: Non labored breathing, age appropriate RR; Resp. Status within EXPECTED Parameters     CV: Uneventful perioperative course   Sign Out status: Appropriate BP and perfusion indices; Appropriate HR/Rhythm     Disposition:   Sign Out in:  ICU  Disposition:  ICU  Recovery Course: Uneventful  Follow-Up: Not required           Last Anesthesia Record Vitals:  CRNA VITALS  10/1/2019 2118 - 10/1/2019 2218      10/1/2019             EKG:  Sinus bradycardia          Last PACU Vitals:  Vitals Value Taken Time   /74 10/1/2019 10:35 PM   Temp     Pulse 52 10/1/2019 10:35 PM   Resp     SpO2 99 % 10/1/2019 10:38 PM   Temp src     NIBP     Pulse     SpO2     Resp     Temp     Ht Rate     Temp 2     Vitals shown include unvalidated device data.      Electronically Signed By: Husam Caro MD, 2019, 10:39 PM

## 2019-10-02 NOTE — PROGRESS NOTES
10/02/19 1000   Quick Adds   Type of Visit Initial Occupational Therapy Evaluation   Living Environment   Lives With child(teodora), dependent;spouse   Living Arrangements house   Home Accessibility stairs to enter home;stairs within home   Number of Stairs, Main Entrance 1   Number of Stairs, Within Home, Primary   (12; 1 flight; pt does not need to use)   Stair Railings, Within Home, Primary   (yes)   Transportation Anticipated car, drives self   Living Environment Comment Pt lives with her  and 4 children ages 12 to 20   Self-Care   Usual Activity Tolerance good   Current Activity Tolerance moderate   Regular Exercise Yes   Activity/Exercise Type running/jogging;strength training;walking   Exercise Amount/Frequency daily   Equipment Currently Used at Home none   Activity/Exercise/Self-Care Comment Pt was previously independent with all mobility and self cares.  Pt exercises regularly doing push ups, squats, walking, running but has not been able to exercise for the last approx 2 weeks 2/2 severe headaches.  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  Pt reports approx 2 weeks of headaches, transient tingling in B feet and weakness in BLE, intermittent word finding difficulties- reports symptoms were subtle such that she attributed them to the headaches and was still able to engage in basic mobility and self cares   Functional Level   Ambulation 0-->independent   Transferring 0-->independent   Toileting 0-->independent   Bathing 0-->independent   Dressing 0-->independent   Eating 0-->independent   Cognition 0 - no cognition issues reported   Fall history within last six months yes   Number of times patient has fallen within last six months 3   Which of the above functional risks had a recent onset or change? ambulation;transferring;bathing       Present no   Language english   General Information   Onset of Illness/Injury or Date of Surgery - Date  09/30/19   Referring Physician Chris Trevino MD   Patient/Family Goals Statement Return to home   Additional Occupational Profile Info/Pertinent History of Current Problem Ms. Dempsey is a 44-year-old woman with no past medical history (not currently taking any medications) who presents as direct admission to the ICU following approximately 3 weeks of midline headache 6-7/10 intensity and progressive word-finding difficulty.  Upon presentation to the outside hospital, the patient underwent brain MRI, which demonstrated a large left frontal rim-enhancing septated brain mass with significant edema resulting in rightward subfalcine herniation. now s/p craniotomy for tumor resection   Precautions/Limitations   (craniotomy precautions)   General Observations pt pleasant and agreeable;  present and supportive   General Info Comments Activity: up with assist   Cognitive Status Examination   Orientation orientation to person, place and time   Level of Consciousness alert   Follows Commands (Cognition) follows one step commands   Memory   (mild impairment)   Cognitive Comment Pt is converstional and responds appropriately to most questions; however, does demonstrate occasional word finding difficulty as well as some difficulty with sequencing/problem solving/delayed recall though they appear mild.  May benefit from further assessment to differenciate between language deficits vs cognitive deficits   Visual Perception   Visual Perception Wears glasses   Visual Perception Comments no acute visual changes noted   Sensory Examination   Sensory Quick Adds No deficits were identified   Sensory Comments Pt reports transient tingling in B feet prior to admission; currently no sensory deficits post op   Pain Assessment   Patient Currently in Pain   (incisional pain)   Range of Motion (ROM)   ROM Quick Adds No deficits were identified   Strength   Strength Comments strength grossly 5/5 in all extremities   Mobility   Bed  Mobility Bed mobility skill: Sit to supine;Bed mobility skill: Supine to sit   Bed Mobility Skill: Sit to Supine   Level of Trinity: Sit/Supine stand-by assist   Bed Mobility Skill: Supine to Sit   Level of Trinity: Supine/Sit stand-by assist   Transfer Skill: Bed to Chair/Chair to Bed   Level of Trinity: Bed to Chair stand-by assist   Transfer Skill: Sit to Stand   Level of Trinity: Sit/Stand stand-by assist   Balance   Balance Comments mild unsteadiness with dynamic balance challenges; ambulates somewhat guarded - possibly post surgical hesitation - will continue to assess   Lower Body Dressing   Level of Trinity: Dress Lower Body minimum assist (75% patients effort)   Instrumental Activities of Daily Living (IADL)   Previous Responsibilities meal prep;housekeeping;laundry;shopping;driving;work;   IADL Comments family able to assist with IADLs prn   Activities of Daily Living Analysis   Impairments Contributing to Impaired Activities of Daily Living balance impaired;cognition impaired;pain;post surgical precautions   ADL Comments word finding difficulties   General Therapy Interventions   Planned Therapy Interventions ADL retraining;IADL retraining;bed mobility training;cognition;strengthening;transfer training   Clinical Impression   Criteria for Skilled Therapeutic Interventions Met yes, treatment indicated   OT Diagnosis decreased activity tolerance and independence with ADLs   Influenced by the following impairments pain, post surgical precautions, mild speech and cognitive deficit, mild balance deficits   Assessment of Occupational Performance 5 or more Performance Deficits   Identified Performance Deficits bed mobility, transfers, toileting, dressing, bathing, mobility   Clinical Decision Making (Complexity) Low complexity   Therapy Frequency Daily   Predicted Duration of Therapy Intervention (days/wks) 10/8/19   Anticipated Discharge Disposition Home with Assist;Home with  Outpatient Therapy   Risks and Benefits of Treatment have been explained. Yes   Patient, Family & other staff in agreement with plan of care Yes   Clinical Impression Comments Recommend discharge to home with family assist for higher level ADL tasks;  If pt does not experience spontaneous resolution of speech and cognitive deficits with healing post surgically, would recommend further assessment and treatment through OP Speech Therapy.   Total Evaluation Time   Total Evaluation Time (Minutes) 5

## 2019-10-02 NOTE — PLAN OF CARE
OT 4A: Evaluation complete and treatment initiated.  Discharge Planner OT   Patient plan for discharge: Home with family assist - agreeable to OP SLP  Current status: Pt and  receptive to education on post surgical precautions.  SBA for bed mobility, transfers and ambulation, pt somewhat guarded while walking though overall tolerating well.  Scored 10/28 on short blessed cognitive screen and while it appears pt may have some higher level sequencing/delayed recall/executive function deficits - pt difficulties with task seem largely language related.  Barriers to return to prior living situation: acute medical needs, decreased activity tolerance, post surgical precautions  Recommendations for discharge: Home with family assist and OP SLP  Rationale for recommendations: Pt is mobilizing well post op, has good family support for safe discharge to home.  Will benefit from OP Speech therapy to continue to address language vs cognitive deficits.         Entered by: Emelia Kruse 10/02/2019 10:38 AM

## 2019-10-02 NOTE — OP NOTE
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 MRI revealed a 5 cm left frontal mass with significant mass effect. The patient is taken to the OR for tumor resection.    Description of Procedure: After pre-operative laboratory value and informed consent were verified, the patient was brought into the operating room. The patient underwent general anesthesia and intubation. Antibiotic was administered.    The patient was placed in a supine position, with the head turned to the right, exposing the left frontal cranium.  The Accipiter Radaralth reference frame was placed. The patient's anatomy was registered relative to the pre-operative MRI using the Accipiter Radaralth system.    The region overlying the tumor was identified. An pterional incision was planned based on the location of the tumor. The incision is located behind the hairline. The area encompassing the surgical incision was prepped and draped in a sterile manner.     Time out was performed. The incision was infiltrated with 0.5% marcaine with dilute epinephrine. Hemostasis was achieved using a combination of cautery and Justin clips. The incision was retracted using two skin hooks.  The region overlying the tumor was confirmed using the Stealth probe.    A Chantale hole was placed at the posterior edge of the bony exposure and extended into a craniotomy.  Dural bleeding was controlled using tenters. The epidural space was packed with Floseal.     The dura was opened in a c-shape manner with the vascular pedicle located medially.  Corticectomy was performed in the region immediate superficial to the lesion. Cystic fluid was aspirated to decompress the lesion. The tumor specimen was immediately apparent. A biopsy was taken and sent to pathology.     Frozen pathology findings are consistent with high-grade glioma    The plane between the tumor capsule and the normal cerebrum was identified. This plane was developed. Through a combination of tumor debulking and plane development, the dissection as carried out until the entirety of the lesion was removed.     At this point, the microscope was brought in and switched to blue light. There were florescent tumor in the lateral, inferior, and medial border of the resection cavity. Resection was carried out until no visible florescence was observed. Meticulous hemostasis was achieved after the tumor resection. The resection cavity was overlaid with surgicel.    After hemostasis, I turned my attention to the closure.  The craniotomy flap was secured using titanium plate/screw construct. The wound was amply irrigated with bacitracin containing saline. The galea was closed with 2'0 vicryl. The skin was closed with 3'0 Monocryl. Exothin was applied.    I was present and performed the key portions of the procedure.    EBL: < 50 cc's    Specimens: Resected tumor specimens.    Disposition: ICU

## 2019-10-02 NOTE — OR NURSING
Pt has been in Pacu under CRNA care. Report given to writer from [facility]. Per report, Pt has been stable, vitals stable.

## 2019-10-02 NOTE — PLAN OF CARE
Discharge Planner SLP   Patient plan for discharge: unknown  Current status: Language evaluation completed per MD order. Pt presents with moderate expressive aphasia. Pt with grossly functional ability to name pictures and objects, but breakdown is noted with attempt to name to description if item not present or describe object function. Pt also with great difficulty with sentence generation and generative naming task. Anticiapte fatigue and post-op swelling currently negatively impacting function. Will plan to follow daily for tx. Anticipate ongoing ST needs to targed expressive language post discharge  Barriers to return to prior living situation: aphasia  Recommendations for discharge: Home with OP ST   Rationale for recommendations: anticipate need for ST intervention at next level of care. Can be completed at IP facility vs. OP as appropriate per PT/OT       Entered by: Lidia Alarcon 10/02/2019 11:06 AM

## 2019-10-02 NOTE — CONSULTS
Social Work Services Progress Note    Hospital Day: 3  Date of Initial Social Work Evaluation:  Not completed  Collaborated with:  Pt and , Financial Counseling    Data:  SW was consulted for assistance with finances and insurance. Writer received a message from Financial Counseling stating that Pt has [employer] insurance, which does not provide traditional insurance coverage. Pt and family are aware that they will be billed as Self-Pay as they do not qualify for M[employer]ure or MA.    Intervention:  Writer met with Pt and  to inquire if they had other questions related to finances and insurance or any other concerns. Pt's  stated that he was able to receive all of the information he needed from FC and understands everything. They declined other needs at this time.    Assessment:  Pt is making progress and will be transferred out of ICU.    Plan:    Anticipated Disposition:  Home, no needs identified    Barriers to d/c plan:  Medical clearance    Follow Up:  SW will remain available as needed for support.      Nolvia Xiao, [facility]  ICU   Pager: [phone]

## 2019-10-02 NOTE — PROGRESS NOTES
S: S/p Left Craniotomy for Resection of Brain Mass. Continued expressive aphasia postop.    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 S/p Left Craniotomy for Resection of Brain Mass 10/1. Continued expressive aphasia postop.    Plan:     Neuro:   Anti-epileptics: Keppra 7 days  Cerebral Edema: Decadron 4 mg q8h  Drains: none  Required imaging: postop MRI  SLP consulted for word-finding difficulty    Cardiovascular: blood pressure goals: SBP < 140    Pulmonary: Incentive spirometry     Gastrointestinal: advance diet as tolerated    Renal: no issues     Heme:  No issues   Maintain INR < 1.4; Platelet > 100K; Fibrinogen > 200, Hemoglobin > 8    Endocrine: No issues    Infectious: Monitor for fever    PT/OT: pending    DVT prophylaxis: Sequential compression devices    Ulcer prophylaxis: protonix     DISPO:  6A, floor status    Barriers: evaluation by therapies, ambulating, pain controlled on PO medications    Avni Lassiter MD  Neurosurgery Resident PGY2    Please contact neurosurgery resident on call with questions.    Dial * * [phone], enter [phone] when prompted.

## 2019-10-03 ENCOUNTER — APPOINTMENT (OUTPATIENT)
Dept: PHYSICAL THERAPY | Facility: CLINIC | Age: 44
DRG: 025 | End: 2019-10-03
Attending: NEUROLOGICAL SURGERY

## 2019-10-03 ENCOUNTER — APPOINTMENT (OUTPATIENT)
Dept: OCCUPATIONAL THERAPY | Facility: CLINIC | Age: 44
DRG: 025 | End: 2019-10-03
Attending: NEUROLOGICAL SURGERY

## 2019-10-03 ENCOUNTER — APPOINTMENT (OUTPATIENT)
Dept: SPEECH THERAPY | Facility: CLINIC | Age: 44
DRG: 025 | End: 2019-10-03
Attending: NEUROLOGICAL SURGERY

## 2019-10-03 ENCOUNTER — PATIENT OUTREACH (OUTPATIENT)
Dept: CARE COORDINATION | Facility: CLINIC | Age: 44
End: 2019-10-03

## 2019-10-03 VITALS
HEIGHT: 68 IN | HEART RATE: 50 BPM | TEMPERATURE: 97.1 F | RESPIRATION RATE: 16 BRPM | SYSTOLIC BLOOD PRESSURE: 121 MMHG | BODY MASS INDEX: 21.75 KG/M2 | OXYGEN SATURATION: 100 % | DIASTOLIC BLOOD PRESSURE: 71 MMHG | WEIGHT: 143.52 LBS

## 2019-10-03 PROCEDURE — 25000132 ZZH RX MED GY IP 250 OP 250 PS 637: Performed by: NURSE PRACTITIONER

## 2019-10-03 PROCEDURE — 25000131 ZZH RX MED GY IP 250 OP 636 PS 637: Performed by: NURSE PRACTITIONER

## 2019-10-03 PROCEDURE — 92507 TX SP LANG VOICE COMM INDIV: CPT | Mod: GN

## 2019-10-03 PROCEDURE — 97535 SELF CARE MNGMENT TRAINING: CPT | Mod: GO

## 2019-10-03 PROCEDURE — 97116 GAIT TRAINING THERAPY: CPT | Mod: GP

## 2019-10-03 RX ORDER — OXYCODONE HYDROCHLORIDE 5 MG/1
5-10 TABLET ORAL EVERY 6 HOURS PRN
Qty: 40 TABLET | Refills: 0 | Status: SHIPPED | OUTPATIENT
Start: 2019-10-03 | End: 2020-01-01

## 2019-10-03 RX ORDER — AMOXICILLIN 250 MG
1 CAPSULE ORAL 2 TIMES DAILY PRN
Qty: 60 TABLET | Refills: 0 | Status: SHIPPED | OUTPATIENT
Start: 2019-10-03 | End: 2020-01-01

## 2019-10-03 RX ORDER — DEXAMETHASONE 2 MG/1
2 TABLET ORAL EVERY 8 HOURS
Qty: 40 TABLET | Refills: 0 | Status: SHIPPED | OUTPATIENT
Start: 2019-10-03 | End: 2019-10-28

## 2019-10-03 RX ORDER — LEVETIRACETAM 1000 MG/1
1000 TABLET ORAL 2 TIMES DAILY
Qty: 12 TABLET | Refills: 0 | Status: SHIPPED | OUTPATIENT
Start: 2019-10-03 | End: 2019-10-28

## 2019-10-03 RX ORDER — PANTOPRAZOLE SODIUM 40 MG/1
40 TABLET, DELAYED RELEASE ORAL
Qty: 30 TABLET | Refills: 0 | Status: SHIPPED | OUTPATIENT
Start: 2019-10-04 | End: 2019-10-28

## 2019-10-03 RX ADMIN — LEVETIRACETAM 1000 MG: 500 TABLET, FILM COATED ORAL at 08:15

## 2019-10-03 RX ADMIN — ACETAMINOPHEN 975 MG: 325 TABLET, FILM COATED ORAL at 10:51

## 2019-10-03 RX ADMIN — ACETAMINOPHEN 975 MG: 325 TABLET, FILM COATED ORAL at 01:47

## 2019-10-03 RX ADMIN — SENNOSIDES AND DOCUSATE SODIUM 2 TABLET: 8.6; 5 TABLET ORAL at 08:15

## 2019-10-03 RX ADMIN — DEXAMETHASONE 4 MG: 4 TABLET ORAL at 01:47

## 2019-10-03 RX ADMIN — DEXAMETHASONE 4 MG: 4 TABLET ORAL at 10:51

## 2019-10-03 RX ADMIN — PANTOPRAZOLE SODIUM 40 MG: 40 TABLET, DELAYED RELEASE ORAL at 08:15

## 2019-10-03 ASSESSMENT — ACTIVITIES OF DAILY LIVING (ADL)
ADLS_ACUITY_SCORE: 12

## 2019-10-03 ASSESSMENT — PAIN DESCRIPTION - DESCRIPTORS: DESCRIPTORS: HEADACHE

## 2019-10-03 NOTE — PROGRESS NOTES
Care Coordinator - Discharge Planning    Admission Date/Time:  9/30/2019  Attending MD:  Sandip Yo, *     Data  Date of initial CC assessment: 10/3/19  Chart reviewed, discussed with interdisciplinary team.   Patient was admitted for:   1. Brain tumor (H)       Assessment   Concerns with insurance coverage for discharge needs: SW met with patient regarding insurance questions.  Current Living Situation: Patient lives with family.  Support System: Supportive and Involved  Services Involved: none PTA  Transportation at Discharge: Car and Family or friend will provide  Transportation to Medical Appointments:    - Name of caregiver:   Barriers to Discharge: medical clearance    Coordination of Care and Referrals: Provided patient/family with options for Outpatient Rehab.  Per MD team, patient is ready for discharge today. Therapies have seen her and recommend OP PT/OT/SLP. This RN CC met with patient/ to discuss. They reports that there is a facility ([facility] [phone]/Fax: [phone])  in their town ([facility]) that has PT/OT but they were not sure about SLP. I spoke with staff at [facility]. They reports that they can provide all 3. They will need to have orders faxed and then patient can call to set up appointments.   Provided this information to patient/. They will call to schedule once home. This RN CC will fax orders to [facility]     Plan  Anticipated Discharge Date:  Today  Anticipated Discharge Plan:  Home with Op PT/OT/SLP    CTS Handoff completed:  YES    Chad Goel RN Care Coordinator [phone]

## 2019-10-03 NOTE — DISCHARGE INSTRUCTIONS
Please call the [facility] in [facility] [phone] to arrange your out patient PT/OT/Speech Therapy appointments

## 2019-10-03 NOTE — PLAN OF CARE
Discharge Planner PT   Patient plan for discharge: home   Current status: Pt performs functional mobility mod indep. Ambulated 2 x 200'+ without AD, mod indep. Completed 2 x 3 steps with L ascending handrail mod indep. All goals met. Pt is indep with precautions.   Barriers to return to prior living situation: medical needs   Recommendations for discharge: home with PRN support from family.   Rationale for recommendations: pt does not require A for functional mobility. Safe to dc home.        Entered by: Fiona Miller 10/03/2019 11:43 AM     Physical Therapy Discharge Summary    Reason for therapy discharge:    Discharged to home.    Progress towards therapy goal(s). See goals on Care Plan in [facility] electronic health record for goal details.  Goals met    Therapy recommendation(s):    No further therapy is recommended.

## 2019-10-03 NOTE — PLAN OF CARE
Status: s/p L crani/tumor resection   Vitals: intermittently wayne baseline  Neuros: A/Ox4 with choices this shift. Expressive aphasia.  IV: PIV SL  Resp/trach: LS clear, RA  Diet: Regular  Bowel status: No BM this shift  : Voiding spontaneously   Skin: L crani incision KAROLINA, L periorbital swelling   Pain: Headache partially controlled with scheduled Tylenol. Denied Oxy when offered.   Activity: SBA  Social:  bedside and supportive.   Plan: Continue to monitor and assess.

## 2019-10-03 NOTE — PLAN OF CARE
Status: POD #2 left craniotomy for resection of brain mass    Vitals: VSS on RA   Neuros: A/Ox4, allow time for response, slight expressive aphasia.   IV: PIV removed   Resp/trach: LS clear  Diet: Regular  Bowel status: No BM this shift, encouraged BM meds to prevent straining.   : Voiding spontaneously   Skin: L crani incision KAROLINA, L periorbital swelling   Pain: Headache partially controlled with scheduled Tylenol. Denied Oxy when offered.   Activity: SBA  Social:  bedside and supportive.   Plan: Pt d/c around 1400.  and family friend picked up at door, all belongings with pt and . Medications sent to [facility]. Pt stated no questions after AVS.

## 2019-10-03 NOTE — PLAN OF CARE
"Discharge Planner SLP   Patient plan for discharge: Per pt \"I might be going home today\"  Current status: Pt seen for communication tx, pt's  present. Per pt and pt's  report, significant improvement with regard to language abilities than yesterday during evaluation, pt feels she is closer to her baseline status  Able to participate in moderate to complex word finding tasks with min to mod cues, improved from yesterday's evaluation and presenting with a mild expressive aphasia.  SLP to follow for communication tx.   Barriers to return to prior living situation: Language  Recommendations for discharge: Home with assist, OP PT/OT/SLP  Rationale for recommendations: Below baseline function; pt will benefit from ongoing ST targeting language       Entered by: Anne Mai 10/03/2019 10:59 AM       "

## 2019-10-03 NOTE — PLAN OF CARE
Discharge Planner OT   Patient plan for discharge: Home with A  Current status: reviewed ADLs within craniotomy precautions, edu and demo of modified LB dressing techniques  Barriers to return to prior living situation: post-surgical precautions  Recommendations for discharge: Home with A as needed  Rationale for recommendations: Anticipate pt will be safe to discharge home with A as needed.       Entered by: Lubna Danielson 10/03/2019 2:15 PM     Occupational Therapy Discharge Summary    Reason for therapy discharge:    Discharged to home.    Progress towards therapy goal(s). See goals on Care Plan in [facility] electronic health record for goal details.  Goals met    Therapy recommendation(s):    No further therapy is recommended.

## 2019-10-03 NOTE — PLAN OF CARE
Status: S/p Left Craniotomy for Resection of Brain Mass 10/01  Vitals: Bradycardic, other VSS  Neuros: A/O x4. Expressive aphasia, improving. Swelling to left periorbital area.   IV: PIV SL   Diet: Regular   Bowel status: No BM   : Voiding spontaneously. Pt on menses.   Skin: head incision KAROLINA   Pain: no c/o pain   Activity: up SBA   Social: , Jim in room overnight with pt   Plan: MRI completed tonight. Continue to follow POC.

## 2019-10-03 NOTE — PLAN OF CARE
Discharge Planner PT   Patient plan for discharge: home   Current status: PT evaluation completed, treatment initiated. Pt educated on post surgical craniotomy precautions and activity modifications. Pt transfers with SBA. Ambulated 400 ft with no AD, CGA-min A. Mild higher level balance deficits. Recommend ambulation with Ax1 2/2 fall risk at this time.   Barriers to return to prior living situation: pain, post surgical precautions, impaired balance, medical status, expressive aphasia  Recommendations for discharge: home with assist, OP PT/OP SLP  Rationale for recommendations: address above stated deficits       Entered by: Damari Negron 10/03/2019 8:49 AM

## 2019-10-03 NOTE — DISCHARGE SUMMARY
[facility] Discharge Summary and Instructions    Africa Dempsey MRN# [id number]   Age: 44 year old YOB: 1975     Date of Admission:  9/30/2019  Date of Discharge::  10/3/2019   Admitting Physician:  Sandip Yo MD  Discharge Physician:  Sandip Yo MD          Admission Diagnoses:   Brain tumor (H)          Discharge Diagnosis:   Brain tumor (H)          Procedures:   10/1/2019: Left Frontal Craniotomy for Tumor Resection            Brief History of Illness:   Ms. Depmsey is a delightful 44 year old female with no significant medical history who presented to the [facility] on 9/30/2019 due to progressively worsening headaches. The patient reports the onset of headaches about 3 weeks prior to her presentation to the hospital. She was evaluated with an MRI Brain that demonstrated a Large Frontal Rim-Enhancing Mass with surrounding Vasogenic Edema resulting in Rightward Subfalcine Herniation.            Hospital Course:   The patient was admitted to Unit 4A, Neuro-Surgical ICU for close neurological monitoring. Surgical resection of the mass was recommended for to reduce the mass effect as well as to obtain a tissue biopsy. The patient was agreeable with this recommendation. She was initiated on Decadron 4 mg Q 8 hours for treatment of Cerebral Edema. She was also also initiated on Keppra 1,000 mg Q 12 hours for a duration of days for seizure prophylaxis. On 10/1/2019, the patient underwent the above named operation. There were no da-operative complications. Post-operatively, she returned to Unit 4A, for routine post-operative cares. Tissue was sent for pathology. At the time of discharge, the pathology report remained in process. Post-operatively, the patient was evaluated with an MRI Brain to assess the tumor resection cavity. The MRI demonstrated post-surgical changes. It was unclear whether there was residual tumor remaining based on these  "images. On post-operative day #1, the patient was transferred to Unit 6A, Neurosciences. She was evaluated by Physical and Occupational Therapy as well as Speech Therapy and was referred for Outpatient Speech Therapy to address word-finding difficulty. On post-operative day #2, the patient had met all of her discharge goals, including pain well-controlled with oral analgesics, tolerating a regular diet, voiding, passing flatus, and ambulating.      Examination:  /71 (BP Location: Left arm)   Pulse 50   Temp 97.1  F (36.2  C) (Oral)   Resp 16   Ht 1.727 m (5' 8\")   Wt 65.1 kg (143 lb 8.3 oz)   SpO2 100%   BMI 21.82 kg/m    General: Awake;  Alert, In No Acute Distress  Pulm: Breathing Comfortably on room air  Mental status: Oriented x 3, word-finding difficulty  Cranial Nerves: Cranial Nerves II-XII Intact Bilaterally  Strength:               Del       Tr         Bi         WE      WF       Gr  R          5          5          5          5          5          5  L          5          5          5          5          5          5              HF       KE       KF        DF       PF          R          5          5          5          5          5            L          5          5          5          5          5               Pronator Drift: Absent  Sensory: Intact to Light Touch  Reflexes: No Hyperreflexia, Hunt s   Incision: Clean, Dry and Intact with Sutures; Open to Air           Discharge Medications:     Current Discharge Medication List      START taking these medications    Details   dexamethasone (DECADRON) 2 MG tablet Take 1 tablet (2 mg) by mouth every 8 hours  Qty: 40 tablet, Refills: 0    Comments: Take 4 mg every 8 hours on 10/3, 10/4 and 10/5.  Take 3 mg every 8 hours on 10/6, 10/7, and 10/8.  Take 2 mg every 8 hours on 10/9, 10/10, and 10/11.  Take 1 mg every 8 hours on 10/12, 10/13, and 10/14. Then stop.  Associated Diagnoses: Brain tumor (H)      levETIRAcetam (KEPPRA) 1000 MG tablet " Take 1 tablet (1,000 mg) by mouth 2 times daily  Qty: 12 tablet, Refills: 0    Comments: Indication: Prevention of Seizures  Associated Diagnoses: Brain tumor (H)      oxyCODONE (ROXICODONE) 5 MG tablet Take 1-2 tablets (5-10 mg) by mouth every 6 hours as needed for moderate to severe pain  Qty: 40 tablet, Refills: 0    Comments: Indication: Moderate to Severe Post-Operative Pain  Associated Diagnoses: Brain tumor (H)      pantoprazole (PROTONIX) 40 MG EC tablet Take 1 tablet (40 mg) by mouth every morning (before breakfast)  Qty: 30 tablet, Refills: 0    Comments: Indication: GI Protectant with use of Dexamethasone  Associated Diagnoses: Brain tumor (H)      senna-docusate (SENOKOT-S/PERICOLACE) 8.6-50 MG tablet Take 1 tablet by mouth 2 times daily as needed for constipation  Qty: 60 tablet, Refills: 0    Comments: Indication: Prevention of Constipation with use of Opioids  Associated Diagnoses: Brain tumor (H)                     Discharge Instructions and Follow-Up:   Discharge diet: Regular     Discharge activity: You may advance activity as tolerated. No strenuous exercise or heay lifting greater than 10 lbs for 4 weeks or until seen and cleared in clinic.     Discharge follow-up: Follow-up with SANDEE Goodman in the Neuro-Oncology Clinic in about 2 weeks. Please call [phone] to schedule your follow-up appointment.      Wound care: Ok to shower,however no scrubbing of the wound and no soaking of the wound, meaning no bathtubs or swimming pools. Pat dry only. Leave wound open to air.       Please call if you have:  1. increased pain, redness, drainage, swelling at your incision  2. fevers > 101.5 F degrees  3. with any questions or concerns.  You may reach the Neurosurgery clinic at [phone] during regular work hours. ER at [phone].    and ask for the Neurosurgery Resident on call at [phone], during off hours or weekends.         Discharge Disposition:   Discharged to home         Berta Jimenez, [facility],CNRN  Department of Neurosurgery  Pager: [phone]

## 2019-10-03 NOTE — PROGRESS NOTES
Social Work Services Progress Note    Hospital Day: 4  Date of Initial Social Work Evaluation:  Not completed as discharge to home is anticipated, pt is oriented x4 and up with SBA, referral was for insurance issues  Collaborated with:  Chart reviewed    Data:  SW received referral to see pt as per Admissions Facesheet, pt does not have insurance.    Intervention:  SW reviewed [facility] Financial Securing vs Financial Counselor note which states:                       DOS [id number] NASH [phone]: met with patient and , they explained that  they have [employer].   We discussed their income, and they  are significantly over income for any mnsure products or shante care.   They  are aware of self-pay process and discount prior and submitting itemized bills  to [employer].       Assessment:  Pt has been seen by [facility] Financial Dept in regards to insurance as stated above.    Plan:    Anticipated Disposition:  Return to home with recommended outpt speech therapy    Barriers to d/c plan:  Medical readiness    Follow Up:  SW available should add'l needs arise.    PERFECTO Melton  Social Work, 6A  Phone:  [phone]  Pager:  [phone]  10/3/2019

## 2019-10-03 NOTE — PROGRESS NOTES
"   10/02/19 1440   Quick Adds   Type of Visit Initial PT Evaluation   Living Environment   Lives With child(teodora), dependent   Living Arrangements house   Home Accessibility stairs to enter home;stairs within home   Number of Stairs, Main Entrance 1   Transportation Anticipated car, drives self   Living Environment Comment Pt lives with her  and 4 children ages 12 to 20. Works from home.    Self-Care   Usual Activity Tolerance good   Current Activity Tolerance moderate   Regular Exercise Yes   Activity/Exercise Type running/jogging   Exercise Amount/Frequency daily   Equipment Currently Used at Home none   Activity/Exercise/Self-Care Comment Pt was previously independent with mobility. Reports 2 weeks of worsening balance which limited ambulation. Pt's SO also reports LEs were weak. Pt has not been aable to exercise for the past month. Prior to that pt enjoyed    Functional Level Prior   Ambulation 0-->independent   Transferring 0-->independent   Toileting 0-->independent   Bathing 0-->independent   Communication 0-->understands/communicates without difficulty   Swallowing 0-->swallows foods/liquids without difficulty   Cognition 0 - no cognition issues reported   Fall history within last six months yes   Number of times patient has fallen within last six months 3   Which of the above functional risks had a recent onset or change? ambulation;transferring;bathing;toileting;dressing;fall history   Prior Functional Level Comment Pt's significant other reporting 1 witnessed fall, pt reports 2 other falls with ambulation, reports her legs \"gave out\".   General Information   Onset of Illness/Injury or Date of Surgery - Date 10/01/19   Referring Physician Darya Navarro APRN CNP   Patient/Family Goals Statement get back to the level of activity/independence that I was at 2 weeks ago.    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 S/p Left Craniotomy for Resection of Brain Mass 10/1. Continued expressive aphasia postop.   Precautions/Limitations fall precautions  (craniotomy)   General Observations Pt supine in bed upon entry   General Info Comments Activity: ambulate with assist   Cognitive Status Examination   Orientation orientation to person, place and time   Level of Consciousness alert   Follows Commands and Answers Questions 100% of the time;able to follow multistep instructions   Personal Safety and Judgment intact   Memory intact   Integumentary/Edema   Integumentary/Edema Comments Impaired integument at cranial incision.    Posture    Posture Not impaired   Range of Motion (ROM)   ROM Quick Adds No deficits were identified   Strength   Strength Comments BLE strength >3/5, no formal MMT 2/2 craniotomy precautions   Bed Mobility   Bed Mobility Comments Supine>sitting with SBA, HOB at 30 degrees   Transfer Skills   Transfer Comments Sit>stand with SBA   Gait   Gait Comments Pt ambulated 20 ft with no AD, CGA. Pt demonstrates decreased amrita, decreased step length, fixed forward gaze.    Balance   Balance Comments Pt stands with normal LLOYD EO/EC x 10 seconds with mild postural sway, no LOB. NBOS EO/EC x 10 seconds, moderate postural sway but no LOB   Sensory Examination   Sensory Perception no deficits were identified   Coordination   Coordination no deficits were identified   General Therapy Interventions   Planned Therapy Interventions balance training;transfer training;gait training;progressive activity/exercise;home program guidelines   Clinical Impression   Criteria for Skilled Therapeutic Intervention yes, treatment indicated   PT Diagnosis impaired functional mobility   Influenced by the following impairments impaired balance, impaired integument, craniotomy precautions, pain   Functional  "limitations due to impairments Difficulty with bed mobility, transfers, ambulation, stairs   Clinical Presentation Stable/Uncomplicated   Clinical Presentation Rationale clinical judgement   Clinical Decision Making (Complexity) Low complexity   Therapy Frequency 6x/week   Predicted Duration of Therapy Intervention (days/wks) 3 days   Anticipated Discharge Disposition Home with Outpatient Therapy   Risk & Benefits of therapy have been explained Yes   Patient, Family & other staff in agreement with plan of care Yes   [facility] TM \"6 Clicks\"   2016, Trustees of [facility], under license to [employer].  All rights reserved.   6 Clicks Short Forms Daily Activity Inpatient Short Form   [facility]  \"6 Clicks\" Daily Activity Inpatient Short Form   1. Putting on and taking off regular lower body clothing? 4 - None   2. Bathing (including washing, rinsing, drying)? 4 - None   3. Toileting, which includes using toilet, bedpan or urinal? 4 - None   4. Putting on and taking off regular upper body clothing? 4 - None   5. Taking care of personal grooming such as brushing teeth? 3 - A Little   6. Eating meals? 3 - A Little   Daily Activity Raw Score (Score out of 24.Lower scores equate to lower levels of function) 22   Total Evaluation Time   Total Evaluation Time (Minutes) 10     "

## 2019-10-04 NOTE — PLAN OF CARE
Speech Language Therapy Discharge Summary    Reason for therapy discharge:    Discharged to home with outpatient therapy.    Progress towards therapy goal(s). See goals on Care Plan in [facility] electronic health record for goal details.  Goals partially met.  Barriers to achieving goals:   discharge from facility.    Therapy recommendation(s):    Continued therapy is recommended.  Rationale/Recommendations:  OP SLP targeting moderate to complex word finding, mild expressive aphasia.

## 2019-10-04 NOTE — PROGRESS NOTES
Patient has no phone numbers in demographics and thus unable to be contacted for post discharge call.     Chart will be closed out. Landy Alvarado CMA Post Discharge Team

## 2019-10-07 ENCOUNTER — DOCUMENTATION ONLY (OUTPATIENT)
Dept: NEUROSURGERY | Facility: CLINIC | Age: 44
End: 2019-10-07

## 2019-10-07 DIAGNOSIS — C71.1 MALIGNANT NEOPLASM OF FRONTAL LOBE OF BRAIN (H): Primary | ICD-10-CM

## 2019-10-07 DIAGNOSIS — D49.6 BRAIN TUMOR (H): Primary | ICD-10-CM

## 2019-10-07 PROCEDURE — 81121 IDH2 COMMON VARIANTS: CPT | Performed by: NEUROLOGICAL SURGERY

## 2019-10-07 PROCEDURE — 81479 UNLISTED MOLECULAR PATHOLOGY: CPT | Performed by: NEUROLOGICAL SURGERY

## 2019-10-07 PROCEDURE — 81287 MGMT GENE PRMTR MTHYLTN ALYS: CPT | Performed by: NEUROLOGICAL SURGERY

## 2019-10-07 PROCEDURE — 81120 IDH1 COMMON VARIANTS: CPT | Performed by: NEUROLOGICAL SURGERY

## 2019-10-07 PROCEDURE — 81405 MOPATH PROCEDURE LEVEL 6: CPT | Performed by: NEUROLOGICAL SURGERY

## 2019-10-08 ENCOUNTER — TELEPHONE (OUTPATIENT)
Dept: NEUROSURGERY | Facility: CLINIC | Age: 44
End: 2019-10-08

## 2019-10-08 ENCOUNTER — PATIENT OUTREACH (OUTPATIENT)
Dept: ONCOLOGY | Facility: CLINIC | Age: 44
End: 2019-10-08

## 2019-10-08 NOTE — TELEPHONE ENCOUNTER
Called patient and explained pathology is still pending. Encouraged her to call back in 3 to 5 days if she hasn't received results.    Sara Melgar, RN, BSN  Care Coordinator  [facility]

## 2019-10-08 NOTE — TELEPHONE ENCOUNTER
[facility] Call Center    Phone Message    May a detailed message be left on voicemail: yes    Reason for Call: Requesting Results   Name/type of test: biopsy  Date of test: 10/1  Was test done at a location other than [facility] (Please fill in the location if not [facility])?: No      Action Taken: Message routed to:  [facility] ([facility]): chan neuro

## 2019-10-08 NOTE — PROGRESS NOTES
Faxed PT, OT and Speech Therapy orders to:    [facility]    Phone: [phone]   Fax:  [phone]     Fax confirmation received.    Sara Melgar, RN, BSN  Care Coordinator  [facility]

## 2019-10-14 ENCOUNTER — TUMOR CONFERENCE (OUTPATIENT)
Dept: ONCOLOGY | Facility: CLINIC | Age: 44
End: 2019-10-14

## 2019-10-14 NOTE — TUMOR CONFERENCE
Tumor Conference Information  Tumor Conference:    Specialties Present:  Medical oncology, Radiation oncology, Surgery, Radiology, Pathology  Patient Status:  A new patient  Pathology:  Not Discussed  Treatment to Date:  Surgical intervention(s)  Clinical Trial Eligibility:  Not discussed  Recommended Plan:  Concurrent chemoradiation  Did the review exceed 30 minutes?:  did not           Documentation / Disclaimer Cancer Tumor Board Note  Cancer tumor board recommendations do not override what is determined to be reasonable care and treatment, which is dependent on the circumstances of a patient's case; the patient's medical, social, and personal concerns; and the clinical judgment of the oncologist [physician].

## 2019-10-14 NOTE — TELEPHONE ENCOUNTER
ONCOLOGY INTAKE: Records Information      APPT INFORMATION: [id number] Dr. Shannen Hung  Referring provider:  Dr. Sandip Yo  Referring provider s clinic:   Neurosurgery  Reason for visit/diagnosis:  Glioblastoma  Has patient been notified of appointment date and time?: Yes    RECORDS INFORMATION:  Were the records received with the referral (via [employer])? Internal Referral    Has patient been seen for any external appt for this diagnosis? No    If yes, where? NA    Has patient had any imaging or procedures outside of [facility] for this condition? NO      If Yes, where? NA    ADDITIONAL INFORMATION:  None

## 2019-10-17 LAB
COPATH REPORT: NORMAL

## 2019-10-22 LAB — COPATH REPORT: NORMAL

## 2019-10-28 ENCOUNTER — ONCOLOGY VISIT (OUTPATIENT)
Dept: ONCOLOGY | Facility: CLINIC | Age: 44
End: 2019-10-28
Attending: PSYCHIATRY & NEUROLOGY

## 2019-10-28 ENCOUNTER — PRE VISIT (OUTPATIENT)
Dept: ONCOLOGY | Facility: CLINIC | Age: 44
End: 2019-10-28

## 2019-10-28 VITALS
WEIGHT: 152.8 LBS | HEART RATE: 86 BPM | TEMPERATURE: 97.6 F | HEIGHT: 68 IN | SYSTOLIC BLOOD PRESSURE: 118 MMHG | DIASTOLIC BLOOD PRESSURE: 75 MMHG | BODY MASS INDEX: 23.16 KG/M2 | OXYGEN SATURATION: 100 % | RESPIRATION RATE: 14 BRPM

## 2019-10-28 DIAGNOSIS — C71.9 GLIOBLASTOMA (H): Primary | ICD-10-CM

## 2019-10-28 PROBLEM — N92.0 MENORRHAGIA: Status: ACTIVE | Noted: 2019-10-28

## 2019-10-28 PROBLEM — D64.9 ANEMIA: Status: ACTIVE | Noted: 2019-10-28

## 2019-10-28 LAB — COPATH REPORT: NORMAL

## 2019-10-28 PROCEDURE — 99215 OFFICE O/P EST HI 40 MIN: CPT | Mod: ZP | Performed by: PSYCHIATRY & NEUROLOGY

## 2019-10-28 PROCEDURE — G0463 HOSPITAL OUTPT CLINIC VISIT: HCPCS | Mod: ZF

## 2019-10-28 ASSESSMENT — PAIN SCALES - GENERAL: PAINLEVEL: NO PAIN (0)

## 2019-10-28 ASSESSMENT — MIFFLIN-ST. JEOR: SCORE: 1391.6

## 2019-10-28 NOTE — NURSING NOTE
"Oncology Rooming Note    October 28, 2019 9:00 AM   Africa Dempsey is a 44 year old female who presents for:    Chief Complaint   Patient presents with     Oncology Clinic Visit     New - TJ Glioblastoma     Initial Vitals: /75   Pulse 86   Temp 97.6  F (36.4  C) (Oral)   Resp 14   Ht 1.727 m (5' 8\")   Wt 69.3 kg (152 lb 12.8 oz)   SpO2 100%   BMI 23.23 kg/m   Estimated body mass index is 23.23 kg/m  as calculated from the following:    Height as of this encounter: 1.727 m (5' 8\").    Weight as of this encounter: 69.3 kg (152 lb 12.8 oz). Body surface area is 1.82 meters squared.  No Pain (0) Comment: Data Unavailable   No LMP recorded.  Allergies reviewed: Yes  Medications reviewed: Yes    Medications: Medication refills not needed today.  Pharmacy name entered into EPIC: Data Unavailable    Clinical concerns: Concerns about new onset headaches, pulsating with orthostatic changes. A pressure in her head. Short lasting, also dizziness and \"fuzziness\". She noticed this started on 10/17/2019. She has also noted tinnitus since the surgery, in the background. A \"high-pitch ringing\".  She also noted abnormalities with her sight.      Valeriano Pitt, EMT              "

## 2019-10-28 NOTE — PATIENT INSTRUCTIONS
Likely holding on upfront of chemoradiotherapy.   Consideration for repeat MRI at [facility] if not seeing a radiation oncologist.   Please call the clinic with an update.   Please also call with any questions or concerns.     Shannen Hung MD  Neuro-oncology  10/28/2019

## 2019-10-28 NOTE — LETTER
RE: Africa Dempsey  [address]  [facility] [address]     Dear Colleague,    Thank you for referring your patient, Africa Dempsey, to the [facility]. Please see a copy of my visit note below.    NEURO-ONCOLOGY INITIAL VISIT  Oct 28, 2019    CHIEF COMPLAINT: Ms. Africa Dempsey is a 44 year old woman with a left frontal glioblastoma (IDH wildtype, MGMT promoter unmethylated), diagnosed following resection on 10/1/2019. She is presenting to this initial clinic visit as referred by Dr. Sandip Yo, neurosurgery at the [facility], for evaluation and recommendations on treatment. At this point, Africa is deferring standard upfront chemoradiotherapy and investigating alternative/ holistic options.     Accompanying her to this visit is Jim ().       HISTORY OF PRESENT ILLNESS  A summary of the patient s oncologic history is as follows;   -9/2019 PRESENTATION: Progressively worsening headaches over the past 3 weeks   -9/30/2019 MR brain imaging demonstrated a left frontal multi-lobed rim-enhancing mass lesion with associated edema.   -10/1/2019 SURGERY: Craniotomy for mass resection, gross total.   PATHOLOGY: Glioblastoma; IDH1-R132H wildtype/ IDH1, IHD2 wildtype by next generation sequencing. TP53 mutated.  BRAF, PTEN not mutated. MGMT promoter unmethylated.  -10/28/2019 NEURO-ONC: Recommending chemoradiotherapy, however, Africa is opting for alternative/ holistic options.     Today in clinic;   -Africa does complain of headaches starting on 10/17 (3 days after dexamethasone taper ended). Pounding headaches upon standing. Decreasing in frequency; no headaches yesterday or today. Taking Tylenol sparingly.   -More blurred vision, but improving.   -Ability to concentrate and think has improved. More mentally clear.  -Denies any weakness or changes in sensation.  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 Managing her energy well.   -Remaining positive.   -Off all  "steroids.  -Currently taking vitamin C, THC, and tumeric. Looking at other options as well.     REVIEW OF SYSTEMS  A comprehensive ROS negative except as in HPI.      MEDICATIONS   Current Outpatient Medications   Medication Sig Dispense Refill     oxyCODONE (ROXICODONE) 5 MG tablet Take 1-2 tablets (5-10 mg) by mouth every 6 hours as needed for moderate to severe pain 40 tablet 0     senna-docusate (SENOKOT-S/PERICOLACE) 8.6-50 MG tablet Take 1 tablet by mouth 2 times daily as needed for constipation (Patient not taking: Reported on 10/28/2019) 60 tablet 0     DRUG ALLERGIES   Allergies   Allergen Reactions     Amoxicillin Hives     PAST MEDICAL HISTORY   Glioblastoma  Headaches    PAST SURGICAL HISTORY   Past Surgical History:   Procedure Laterality Date     OPTICAL TRACKING SYSTEM CRANIOTOMY Left 10/1/2019    Procedure: Stealth Assisted Left Frontal Cranitomy for Tumor Resection;  Surgeon: Sandip Yo MD;  Location:  OR     SOCIAL HISTORY   History   Smoking Status     Not on file   Smokeless Tobacco     Not on file    Social History    Substance and Sexual Activity      Alcohol use: Not on file     History   Drug Use Not on file   , 2 children.   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    FAMILY HISTORY   No history of brain cancer.   No family history of neurologic disease.    Mother: psychiatric illness.    Father: heart disease.      PHYSICAL EXAMINATION  /75   Pulse 86   Temp 97.6  F (36.4  C) (Oral)   Resp 14   Ht 1.727 m (5' 8\")   Wt 69.3 kg (152 lb 12.8 oz)   SpO2 100%   BMI 23.23 kg/m     Wt Readings from Last 2 Encounters:   10/28/19 69.3 kg (152 lb 12.8 oz)   10/02/19 65.1 kg (143 lb 8.3 oz)      Ht Readings from Last 2 Encounters:   10/28/19 1.727 m (5' 8\")   10/01/19 1.727 m (5' 8\")     KPS: 100    -Generally well appearing.  -Throat: No oral thrush.  -Respiratory: Normal breath sounds, no audible wheezing.   -Skin: No rashes. Healing head " incision; sutures still present.  -Hematologic/ lymphatic: No abnormal bruising. No leg swelling.  -Psychiatric: Normal mood and affect. Pleasant, talkative.  -Neurologic:   MENTAL STATUS:     Alert, oriented to date.    Recall: Immediate 3/3, delayed 2/3 improved to 3/3.    Speech fluent. Comprehension intact to multi-step commands.   Normal naming.   Good right-left orientation.     CRANIAL NERVES:     Discs flat on fundoscopy.    Pupils are round, reactive to light (left pupil slightly larger (~1mm) then the right).     Extraocular movements full, patient denies diplopia.     Visual fields full.     Facial sensation intact to light touch.   Symmetric facial movements.   Hearing intact.   Palate moves symmetrically.     Sternocleidomastoid and trapezius strength intact.    Tongue midline.  MOTOR:    Normal and symmetric tone.   Grossly 5/5 throughout.    No pronation or drift. No orbiting.   Able to rise from a chair without use of arms.   On toe/ heel walk, equal distance from floor to heels/ toes.   SENSATION:    Intact to light touch throughout.  COORDINATION:   Intact finger-nose with eyes open and closed.   REFLEXES:    Normal and symmetric.    Toes not tested. No clonus. No Hoffmans.   No grasp.    GAIT:  Walks without assistance.   Good speed. Normal stride length and heel strike. Normal turns. Normal arm swing.   Able to toe, heel walk. Able to tandem walk.       MEDICAL RECORDS  Obtained and personally reviewed all available outside medical records in addition to reviewing any records available in our electronic system.     LABS  Personally reviewed all available lab results.     IMAGING  Personally reviewed pre- and post-operative MR brain imaging from earlier this month. To my eye, there was a gross total resection of the left frontal mass lesion.     Imaging was shown to and results were reviewed with Andrea.     Imaging and case reviewed and discussed at Brain Tumor Conference.        IMPRESSION  For the 60 minute appointment, more than 50% of the encounter was spent discussing in detail the nature of this tumor, providing emotional support, answering questions pertaining to my recommendations, and devising the treatment plan as outlined below. It was explained to Africa and Jim that she has a brain tumor for which there is currently no cure. Therefore, cancer-directed treatment strives to slow further growth and increase the time interval to recurrence.    With regard to cancer-directed therapy, a multimodal treatment approach first involves attempting a maximum safe surgical resection. In this case, a gross total resection was performed. Following surgery, standard of care for glioblastoma is chemoradiotherapy with temozolomide. At this point in time, Africa is opting to pursue holistic/ alternative treatment measures and she was not interested in me prescribing temozolomide or in a referral to a local radiation oncologist at this time. I encouraged her to move forward with standard, conventional therapy and stated that there was no clinical evidence to support any of the holistic/ alternative measures that she had mentioned. Both Africa and Jim voiced understanding.     PROBLEM LIST  Glioblastoma, MGMT unmethylated and IDH1 wildtype by NGS  Headaches    PLAN  -CANCER-DIRECTED THERAPY-  -As above; Likely holding on upfront of chemoradiotherapy.   -Offered an order for a repeat MRI locally if not seeing a radiation oncologist.    -STEROIDS-  -Currently off dexamethasone.    -SEIZURE MANAGEMENT-  -While this patient is at increased risk of having seizures, given the lack of seizure history, there is no indication to prescribe an antiepileptic at this time.   -Off Keppra.     -Quality of life/ MOOD/ FATIGUE-  -Denies any mood issues.  -Continue to monitor mood as untreated/ undertreated depression can worsen fatigue, dysorexia, and quality of life.    -INCISION SITE-  -Dissolvable  sutures have not dissolved yet. No sign of infection/ inflammation, good healing.   -Dr. Yo alerted.    Return to clinic as needed.     In the meantime, Africa knows to call with questions or concerns or to report new complaints and can be seen sooner if needed. Urgent evaluation is needed in the setting of acute onset of severe headache, abrupt change in mental status, on-going seizures, new focal deficits, or new leg swelling/ pain.     Shannen Hung MD  Neuro-oncology

## 2019-10-28 NOTE — PROGRESS NOTES
NEURO-ONCOLOGY INITIAL VISIT  Oct 28, 2019    CHIEF COMPLAINT: Ms. Africa Dempsey is a 44 year old woman with a left frontal glioblastoma (IDH wildtype, MGMT promoter unmethylated), diagnosed following resection on 10/1/2019. She is presenting to this initial clinic visit as referred by Dr. Sandip Yo, neurosurgery at the [facility], for evaluation and recommendations on treatment. At this point, Africa is deferring standard upfront chemoradiotherapy and investigating alternative/ holistic options.     Accompanying her to this visit is Jim ().       HISTORY OF PRESENT ILLNESS  A summary of the patient s oncologic history is as follows;   -9/2019 PRESENTATION: Progressively worsening headaches over the past 3 weeks   -9/30/2019 MR brain imaging demonstrated a left frontal multi-lobed rim-enhancing mass lesion with associated edema.   -10/1/2019 SURGERY: Craniotomy for mass resection, gross total.   PATHOLOGY: Glioblastoma; IDH1-R132H wildtype/ IDH1, IHD2 wildtype by next generation sequencing. TP53 mutated.  BRAF, PTEN not mutated. MGMT promoter unmethylated.  -10/28/2019 NEURO-ONC: Recommending chemoradiotherapy, however, Africa is opting for alternative/ holistic options.     Today in clinic;   -Africa does complain of headaches starting on 10/17 (3 days after dexamethasone taper ended). Pounding headaches upon standing. Decreasing in frequency; no headaches yesterday or today. Taking Tylenol sparingly.   -More blurred vision, but improving.   -Ability to concentrate and think has improved. More mentally clear.  -Denies any weakness or changes in sensation.  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 Managing her energy well.   -Remaining positive.   -Off all steroids.  -Currently taking vitamin C, THC, and tumeric. Looking at other options as well.     REVIEW OF SYSTEMS  A comprehensive ROS negative except as in HPI.      MEDICATIONS   Current Outpatient Medications   Medication Sig  Patient to make appointment for ear cleaning (November)  One year remainder.  "Dispense Refill     oxyCODONE (ROXICODONE) 5 MG tablet Take 1-2 tablets (5-10 mg) by mouth every 6 hours as needed for moderate to severe pain 40 tablet 0     senna-docusate (SENOKOT-S/PERICOLACE) 8.6-50 MG tablet Take 1 tablet by mouth 2 times daily as needed for constipation (Patient not taking: Reported on 10/28/2019) 60 tablet 0     DRUG ALLERGIES   Allergies   Allergen Reactions     Amoxicillin Hives     PAST MEDICAL HISTORY   Glioblastoma  Headaches    PAST SURGICAL HISTORY   Past Surgical History:   Procedure Laterality Date     OPTICAL TRACKING SYSTEM CRANIOTOMY Left 10/1/2019    Procedure: Stealth Assisted Left Frontal Cranitomy for Tumor Resection;  Surgeon: Sandip Yo MD;  Location:  OR     SOCIAL HISTORY   History   Smoking Status     Not on file   Smokeless Tobacco     Not on file    Social History    Substance and Sexual Activity      Alcohol use: Not on file     History   Drug Use Not on file   , 2 children.   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    FAMILY HISTORY   No history of brain cancer.   No family history of neurologic disease.    Mother: psychiatric illness.    Father: heart disease.      PHYSICAL EXAMINATION  /75   Pulse 86   Temp 97.6  F (36.4  C) (Oral)   Resp 14   Ht 1.727 m (5' 8\")   Wt 69.3 kg (152 lb 12.8 oz)   SpO2 100%   BMI 23.23 kg/m    Wt Readings from Last 2 Encounters:   10/28/19 69.3 kg (152 lb 12.8 oz)   10/02/19 65.1 kg (143 lb 8.3 oz)      Ht Readings from Last 2 Encounters:   10/28/19 1.727 m (5' 8\")   10/01/19 1.727 m (5' 8\")     KPS: 100    -Generally well appearing.  -Throat: No oral thrush.  -Respiratory: Normal breath sounds, no audible wheezing.   -Skin: No rashes. Healing head incision; sutures still present.  -Hematologic/ lymphatic: No abnormal bruising. No leg swelling.  -Psychiatric: Normal mood and affect. Pleasant, talkative.  -Neurologic:   MENTAL STATUS:     Alert, oriented to date.    Recall: " Immediate 3/3, delayed 2/3 improved to 3/3.    Speech fluent. Comprehension intact to multi-step commands.   Normal naming.   Good right-left orientation.     CRANIAL NERVES:     Discs flat on fundoscopy.    Pupils are round, reactive to light (left pupil slightly larger (~1mm) then the right).     Extraocular movements full, patient denies diplopia.     Visual fields full.     Facial sensation intact to light touch.   Symmetric facial movements.   Hearing intact.   Palate moves symmetrically.     Sternocleidomastoid and trapezius strength intact.    Tongue midline.  MOTOR:    Normal and symmetric tone.   Grossly 5/5 throughout.    No pronation or drift. No orbiting.   Able to rise from a chair without use of arms.   On toe/ heel walk, equal distance from floor to heels/ toes.   SENSATION:    Intact to light touch throughout.  COORDINATION:   Intact finger-nose with eyes open and closed.   REFLEXES:    Normal and symmetric.    Toes not tested. No clonus. No Hoffmans.   No grasp.    GAIT:  Walks without assistance.   Good speed. Normal stride length and heel strike. Normal turns. Normal arm swing.   Able to toe, heel walk. Able to tandem walk.       MEDICAL RECORDS  Obtained and personally reviewed all available outside medical records in addition to reviewing any records available in our electronic system.     LABS  Personally reviewed all available lab results.     IMAGING  Personally reviewed pre- and post-operative MR brain imaging from earlier this month. To my eye, there was a gross total resection of the left frontal mass lesion.     Imaging was shown to and results were reviewed with Andrea.     Imaging and case reviewed and discussed at Brain Tumor Conference.       IMPRESSION  For the 60 minute appointment, more than 50% of the encounter was spent discussing in detail the nature of this tumor, providing emotional support, answering questions pertaining to my recommendations, and devising the  treatment plan as outlined below. It was explained to Africa and Jim that she has a brain tumor for which there is currently no cure. Therefore, cancer-directed treatment strives to slow further growth and increase the time interval to recurrence.    With regard to cancer-directed therapy, a multimodal treatment approach first involves attempting a maximum safe surgical resection. In this case, a gross total resection was performed. Following surgery, standard of care for glioblastoma is chemoradiotherapy with temozolomide. At this point in time, Africa is opting to pursue holistic/ alternative treatment measures and she was not interested in me prescribing temozolomide or in a referral to a local radiation oncologist at this time. I encouraged her to move forward with standard, conventional therapy and stated that there was no clinical evidence to support any of the holistic/ alternative measures that she had mentioned. Both Africa and Jim voiced understanding.     PROBLEM LIST  Glioblastoma, MGMT unmethylated and IDH1 wildtype by NGS  Headaches    PLAN  -CANCER-DIRECTED THERAPY-  -As above; Likely holding on upfront of chemoradiotherapy.   -Offered an order for a repeat MRI locally if not seeing a radiation oncologist.    -STEROIDS-  -Currently off dexamethasone.    -SEIZURE MANAGEMENT-  -While this patient is at increased risk of having seizures, given the lack of seizure history, there is no indication to prescribe an antiepileptic at this time.   -Off Keppra.     -Quality of life/ MOOD/ FATIGUE-  -Denies any mood issues.  -Continue to monitor mood as untreated/ undertreated depression can worsen fatigue, dysorexia, and quality of life.    -INCISION SITE-  -Dissolvable sutures have not dissolved yet. No sign of infection/ inflammation, good healing.   -Dr. Yo alerted.    Return to clinic as needed.     In the meantime, Africa knows to call with questions or concerns or to report new complaints and can be  seen sooner if needed. Urgent evaluation is needed in the setting of acute onset of severe headache, abrupt change in mental status, on-going seizures, new focal deficits, or new leg swelling/ pain.     Shannen Hung MD  Neuro-oncology

## 2020-01-01 ENCOUNTER — VIRTUAL VISIT (OUTPATIENT)
Dept: ONCOLOGY | Facility: CLINIC | Age: 45
End: 2020-01-01
Attending: PSYCHIATRY & NEUROLOGY
Payer: COMMERCIAL

## 2020-01-01 ENCOUNTER — HOSPITAL ENCOUNTER (OUTPATIENT)
Dept: GENERAL RADIOLOGY | Facility: CLINIC | Age: 45
End: 2020-10-13
Attending: PSYCHIATRY & NEUROLOGY
Payer: COMMERCIAL

## 2020-01-01 ENCOUNTER — VIRTUAL VISIT (OUTPATIENT)
Dept: RADIATION THERAPY | Facility: OUTPATIENT CENTER | Age: 45
End: 2020-01-01
Payer: COMMERCIAL

## 2020-01-01 ENCOUNTER — PATIENT OUTREACH (OUTPATIENT)
Dept: ONCOLOGY | Facility: CLINIC | Age: 45
End: 2020-01-01

## 2020-01-01 ENCOUNTER — APPOINTMENT (OUTPATIENT)
Dept: RADIATION THERAPY | Facility: OUTPATIENT CENTER | Age: 45
End: 2020-01-01
Payer: COMMERCIAL

## 2020-01-01 ENCOUNTER — TELEPHONE (OUTPATIENT)
Dept: ONCOLOGY | Facility: CLINIC | Age: 45
End: 2020-01-01

## 2020-01-01 ENCOUNTER — PATIENT OUTREACH (OUTPATIENT)
Dept: CARE COORDINATION | Facility: CLINIC | Age: 45
End: 2020-01-01

## 2020-01-01 ENCOUNTER — OFFICE VISIT (OUTPATIENT)
Dept: RADIATION THERAPY | Facility: OUTPATIENT CENTER | Age: 45
End: 2020-01-01
Payer: COMMERCIAL

## 2020-01-01 ENCOUNTER — HOSPITAL ENCOUNTER (OUTPATIENT)
Dept: LAB | Facility: CLINIC | Age: 45
Discharge: HOME | End: 2020-11-24
Attending: PSYCHIATRY & NEUROLOGY | Admitting: PSYCHIATRY & NEUROLOGY
Payer: COMMERCIAL

## 2020-01-01 ENCOUNTER — MYC MEDICAL ADVICE (OUTPATIENT)
Dept: ONCOLOGY | Facility: CLINIC | Age: 45
End: 2020-01-01

## 2020-01-01 ENCOUNTER — HOSPITAL ENCOUNTER (OUTPATIENT)
Dept: LAB | Facility: CLINIC | Age: 45
Discharge: HOME | End: 2020-12-22
Attending: PSYCHIATRY & NEUROLOGY | Admitting: PSYCHIATRY & NEUROLOGY
Payer: COMMERCIAL

## 2020-01-01 ENCOUNTER — DOCUMENTATION ONLY (OUTPATIENT)
Dept: ONCOLOGY | Facility: CLINIC | Age: 45
End: 2020-01-01

## 2020-01-01 ENCOUNTER — APPOINTMENT (OUTPATIENT)
Dept: CARDIOLOGY | Facility: CLINIC | Age: 45
DRG: 054 | End: 2020-01-01
Attending: NEUROLOGICAL SURGERY
Payer: COMMERCIAL

## 2020-01-01 ENCOUNTER — INFUSION THERAPY VISIT (OUTPATIENT)
Dept: INFUSION THERAPY | Facility: CLINIC | Age: 45
End: 2020-01-01
Attending: PSYCHIATRY & NEUROLOGY
Payer: COMMERCIAL

## 2020-01-01 ENCOUNTER — VIRTUAL VISIT (OUTPATIENT)
Dept: ONCOLOGY | Facility: CLINIC | Age: 45
End: 2020-01-01
Attending: INTERNAL MEDICINE
Payer: COMMERCIAL

## 2020-01-01 ENCOUNTER — ANCILLARY PROCEDURE (OUTPATIENT)
Dept: MRI IMAGING | Facility: CLINIC | Age: 45
End: 2020-01-01
Attending: PSYCHIATRY & NEUROLOGY
Payer: COMMERCIAL

## 2020-01-01 ENCOUNTER — TUMOR CONFERENCE (OUTPATIENT)
Dept: ONCOLOGY | Facility: CLINIC | Age: 45
End: 2020-01-01

## 2020-01-01 ENCOUNTER — TRANSFERRED RECORDS (OUTPATIENT)
Dept: HEALTH INFORMATION MANAGEMENT | Facility: CLINIC | Age: 45
End: 2020-01-01

## 2020-01-01 ENCOUNTER — APPOINTMENT (OUTPATIENT)
Dept: NEUROLOGY | Facility: CLINIC | Age: 45
DRG: 054 | End: 2020-01-01
Attending: STUDENT IN AN ORGANIZED HEALTH CARE EDUCATION/TRAINING PROGRAM
Payer: COMMERCIAL

## 2020-01-01 ENCOUNTER — HOSPITAL ENCOUNTER (OUTPATIENT)
Dept: MRI IMAGING | Facility: CLINIC | Age: 45
End: 2020-11-10
Attending: PSYCHIATRY & NEUROLOGY
Payer: COMMERCIAL

## 2020-01-01 ENCOUNTER — NURSE TRIAGE (OUTPATIENT)
Dept: NURSING | Facility: CLINIC | Age: 45
End: 2020-01-01

## 2020-01-01 ENCOUNTER — HOSPITAL ENCOUNTER (INPATIENT)
Facility: CLINIC | Age: 45
LOS: 2 days | Discharge: HOME | DRG: 054 | End: 2020-07-03
Attending: NEUROLOGICAL SURGERY | Admitting: NEUROLOGICAL SURGERY
Payer: COMMERCIAL

## 2020-01-01 ENCOUNTER — HOSPITAL ENCOUNTER (OUTPATIENT)
Dept: LAB | Facility: CLINIC | Age: 45
End: 2020-10-13
Attending: PSYCHIATRY & NEUROLOGY
Payer: COMMERCIAL

## 2020-01-01 ENCOUNTER — MEDICAL CORRESPONDENCE (OUTPATIENT)
Dept: HEALTH INFORMATION MANAGEMENT | Facility: CLINIC | Age: 45
End: 2020-01-01

## 2020-01-01 ENCOUNTER — HOSPITAL ENCOUNTER (OUTPATIENT)
Dept: LAB | Facility: CLINIC | Age: 45
Discharge: HOME | End: 2020-12-08
Attending: PSYCHIATRY & NEUROLOGY | Admitting: PSYCHIATRY & NEUROLOGY
Payer: COMMERCIAL

## 2020-01-01 ENCOUNTER — APPOINTMENT (OUTPATIENT)
Dept: GENERAL RADIOLOGY | Facility: CLINIC | Age: 45
DRG: 054 | End: 2020-01-01
Attending: STUDENT IN AN ORGANIZED HEALTH CARE EDUCATION/TRAINING PROGRAM
Payer: COMMERCIAL

## 2020-01-01 ENCOUNTER — APPOINTMENT (OUTPATIENT)
Dept: MRI IMAGING | Facility: CLINIC | Age: 45
DRG: 054 | End: 2020-01-01
Attending: STUDENT IN AN ORGANIZED HEALTH CARE EDUCATION/TRAINING PROGRAM
Payer: COMMERCIAL

## 2020-01-01 ENCOUNTER — TELEPHONE (OUTPATIENT)
Dept: RADIATION ONCOLOGY | Facility: CLINIC | Age: 45
End: 2020-01-01

## 2020-01-01 ENCOUNTER — HOSPITAL ENCOUNTER (OUTPATIENT)
Dept: MRI IMAGING | Facility: CLINIC | Age: 45
Discharge: HOME | End: 2020-08-28
Attending: PSYCHIATRY & NEUROLOGY | Admitting: PSYCHIATRY & NEUROLOGY
Payer: COMMERCIAL

## 2020-01-01 ENCOUNTER — HOSPITAL ENCOUNTER (OUTPATIENT)
Dept: LAB | Facility: CLINIC | Age: 45
Discharge: HOME | End: 2020-10-27
Attending: PSYCHIATRY & NEUROLOGY | Admitting: PSYCHIATRY & NEUROLOGY
Payer: COMMERCIAL

## 2020-01-01 ENCOUNTER — HOSPITAL ENCOUNTER (OUTPATIENT)
Dept: LAB | Facility: CLINIC | Age: 45
End: 2020-11-10
Attending: PSYCHIATRY & NEUROLOGY
Payer: COMMERCIAL

## 2020-01-01 ENCOUNTER — DOCUMENTATION ONLY (OUTPATIENT)
Dept: RADIATION THERAPY | Facility: OUTPATIENT CENTER | Age: 45
End: 2020-01-01

## 2020-01-01 VITALS
RESPIRATION RATE: 18 BRPM | HEART RATE: 55 BPM | SYSTOLIC BLOOD PRESSURE: 108 MMHG | OXYGEN SATURATION: 100 % | BODY MASS INDEX: 22.05 KG/M2 | DIASTOLIC BLOOD PRESSURE: 72 MMHG | WEIGHT: 145 LBS

## 2020-01-01 VITALS — TEMPERATURE: 96.5 F | SYSTOLIC BLOOD PRESSURE: 120 MMHG | DIASTOLIC BLOOD PRESSURE: 75 MMHG | HEART RATE: 53 BPM

## 2020-01-01 VITALS
BODY MASS INDEX: 22.12 KG/M2 | TEMPERATURE: 98.1 F | SYSTOLIC BLOOD PRESSURE: 124 MMHG | WEIGHT: 145.5 LBS | DIASTOLIC BLOOD PRESSURE: 75 MMHG | OXYGEN SATURATION: 95 % | RESPIRATION RATE: 18 BRPM | HEART RATE: 75 BPM

## 2020-01-01 VITALS
BODY MASS INDEX: 24.4 KG/M2 | SYSTOLIC BLOOD PRESSURE: 128 MMHG | HEART RATE: 70 BPM | OXYGEN SATURATION: 97 % | DIASTOLIC BLOOD PRESSURE: 84 MMHG | TEMPERATURE: 96.5 F | RESPIRATION RATE: 16 BRPM | WEIGHT: 155.8 LBS

## 2020-01-01 VITALS
HEART RATE: 64 BPM | BODY MASS INDEX: 21.23 KG/M2 | SYSTOLIC BLOOD PRESSURE: 98 MMHG | DIASTOLIC BLOOD PRESSURE: 61 MMHG | RESPIRATION RATE: 16 BRPM | WEIGHT: 139.6 LBS | OXYGEN SATURATION: 100 %

## 2020-01-01 VITALS — WEIGHT: 155 LBS | HEIGHT: 67 IN | BODY MASS INDEX: 24.33 KG/M2

## 2020-01-01 VITALS
RESPIRATION RATE: 18 BRPM | TEMPERATURE: 95.5 F | HEART RATE: 78 BPM | DIASTOLIC BLOOD PRESSURE: 80 MMHG | SYSTOLIC BLOOD PRESSURE: 120 MMHG

## 2020-01-01 VITALS
DIASTOLIC BLOOD PRESSURE: 74 MMHG | RESPIRATION RATE: 18 BRPM | WEIGHT: 142 LBS | SYSTOLIC BLOOD PRESSURE: 113 MMHG | OXYGEN SATURATION: 100 % | BODY MASS INDEX: 21.59 KG/M2 | HEART RATE: 51 BPM

## 2020-01-01 VITALS
HEART RATE: 75 BPM | OXYGEN SATURATION: 98 % | DIASTOLIC BLOOD PRESSURE: 78 MMHG | SYSTOLIC BLOOD PRESSURE: 127 MMHG | TEMPERATURE: 95.9 F

## 2020-01-01 VITALS
BODY MASS INDEX: 24.5 KG/M2 | SYSTOLIC BLOOD PRESSURE: 124 MMHG | OXYGEN SATURATION: 95 % | TEMPERATURE: 96.6 F | DIASTOLIC BLOOD PRESSURE: 81 MMHG | HEART RATE: 70 BPM | WEIGHT: 156.4 LBS | RESPIRATION RATE: 16 BRPM

## 2020-01-01 VITALS — SYSTOLIC BLOOD PRESSURE: 130 MMHG | HEART RATE: 73 BPM | DIASTOLIC BLOOD PRESSURE: 84 MMHG | TEMPERATURE: 97.5 F

## 2020-01-01 VITALS — SYSTOLIC BLOOD PRESSURE: 135 MMHG | DIASTOLIC BLOOD PRESSURE: 84 MMHG | TEMPERATURE: 97.7 F | HEART RATE: 83 BPM

## 2020-01-01 VITALS
SYSTOLIC BLOOD PRESSURE: 108 MMHG | BODY MASS INDEX: 21.29 KG/M2 | DIASTOLIC BLOOD PRESSURE: 76 MMHG | WEIGHT: 140 LBS | HEART RATE: 64 BPM

## 2020-01-01 VITALS — WEIGHT: 148 LBS | BODY MASS INDEX: 23.18 KG/M2

## 2020-01-01 DIAGNOSIS — C71.9 GLIOBLASTOMA (H): Primary | ICD-10-CM

## 2020-01-01 DIAGNOSIS — T45.1X5A CHEMOTHERAPY-INDUCED NEUTROPENIA (H): ICD-10-CM

## 2020-01-01 DIAGNOSIS — C71.9 GLIOBLASTOMA (H): ICD-10-CM

## 2020-01-01 DIAGNOSIS — R11.2 CHEMOTHERAPY-INDUCED NAUSEA AND VOMITING: ICD-10-CM

## 2020-01-01 DIAGNOSIS — R14.0 ABDOMINAL BLOATING: ICD-10-CM

## 2020-01-01 DIAGNOSIS — Z91.89 HIGH RISK FOR CHEMOTHERAPY-INDUCED INFECTIOUS COMPLICATION: ICD-10-CM

## 2020-01-01 DIAGNOSIS — M79.89 LEG SWELLING: ICD-10-CM

## 2020-01-01 DIAGNOSIS — T45.1X5A CHEMOTHERAPY-INDUCED NAUSEA AND VOMITING: ICD-10-CM

## 2020-01-01 DIAGNOSIS — B37.0 ORAL THRUSH: Primary | ICD-10-CM

## 2020-01-01 DIAGNOSIS — D49.6 BRAIN TUMOR (H): ICD-10-CM

## 2020-01-01 DIAGNOSIS — Z79.899 ENCOUNTER FOR LONG-TERM (CURRENT) USE OF MEDICATIONS: ICD-10-CM

## 2020-01-01 DIAGNOSIS — R14.0 ABDOMINAL BLOATING: Primary | ICD-10-CM

## 2020-01-01 DIAGNOSIS — G93.6 BRAIN SWELLING (H): ICD-10-CM

## 2020-01-01 DIAGNOSIS — G40.909 SEIZURE DISORDER (H): ICD-10-CM

## 2020-01-01 DIAGNOSIS — D70.1 CHEMOTHERAPY-INDUCED NEUTROPENIA (H): ICD-10-CM

## 2020-01-01 DIAGNOSIS — K59.03 DRUG-INDUCED CONSTIPATION: Primary | ICD-10-CM

## 2020-01-01 DIAGNOSIS — G40.909 SEIZURE DISORDER (H): Primary | ICD-10-CM

## 2020-01-01 DIAGNOSIS — D49.6 BRAIN TUMOR (H): Primary | ICD-10-CM

## 2020-01-01 LAB
ABO + RH BLD: NORMAL
ABO + RH BLD: NORMAL
ALBUMIN SERPL-MCNC: 3.3 G/DL (ref 3.4–5)
ALBUMIN SERPL-MCNC: 3.5 G/DL (ref 3.4–5)
ALBUMIN SERPL-MCNC: 3.6 G/DL (ref 3.4–5)
ALBUMIN SERPL-MCNC: 3.7 G/DL (ref 3.4–5)
ALBUMIN SERPL-MCNC: 3.7 G/DL (ref 3.4–5)
ALBUMIN SERPL-MCNC: 3.8 G/DL (ref 3.4–5)
ALBUMIN SERPL-MCNC: 3.9 G/DL (ref 3.4–5)
ALP SERPL-CCNC: 61 U/L (ref 40–150)
ALP SERPL-CCNC: 70 U/L (ref 40–150)
ALP SERPL-CCNC: 70 U/L (ref 40–150)
ALP SERPL-CCNC: 71 U/L (ref 40–150)
ALP SERPL-CCNC: 74 U/L (ref 40–150)
ALP SERPL-CCNC: 77 U/L (ref 40–150)
ALP SERPL-CCNC: 79 U/L (ref 40–150)
ALP SERPL-CCNC: 85 U/L (ref 40–150)
ALT SERPL W P-5'-P-CCNC: 17 U/L (ref 0–50)
ALT SERPL W P-5'-P-CCNC: 18 U/L (ref 0–50)
ALT SERPL W P-5'-P-CCNC: 19 U/L (ref 0–50)
ALT SERPL W P-5'-P-CCNC: 21 U/L (ref 0–50)
ALT SERPL W P-5'-P-CCNC: 29 U/L (ref 0–50)
ALT SERPL W P-5'-P-CCNC: 42 U/L (ref 0–50)
ALT SERPL W P-5'-P-CCNC: 42 U/L (ref 0–50)
ALT SERPL W P-5'-P-CCNC: 51 U/L (ref 0–50)
ALT SERPL-CCNC: 22 U/L
ANION GAP SERPL CALCULATED.3IONS-SCNC: 2 MMOL/L (ref 3–14)
ANION GAP SERPL CALCULATED.3IONS-SCNC: 3 MMOL/L (ref 3–14)
ANION GAP SERPL CALCULATED.3IONS-SCNC: 4 MMOL/L (ref 3–14)
ANION GAP SERPL CALCULATED.3IONS-SCNC: 5 MMOL/L (ref 3–14)
ANION GAP SERPL CALCULATED.3IONS-SCNC: 6 MMOL/L (ref 3–14)
ANION GAP SERPL CALCULATED.3IONS-SCNC: 6 MMOL/L (ref 3–14)
ANION GAP SERPL CALCULATED.3IONS-SCNC: 8 MMOL/L (ref 3–14)
APTT PPP: 25 SEC (ref 22–37)
AST SERPL W P-5'-P-CCNC: 10 U/L (ref 0–45)
AST SERPL W P-5'-P-CCNC: 12 U/L (ref 0–45)
AST SERPL W P-5'-P-CCNC: 17 U/L (ref 0–45)
AST SERPL W P-5'-P-CCNC: 17 U/L (ref 0–45)
AST SERPL W P-5'-P-CCNC: 19 U/L (ref 0–45)
AST SERPL W P-5'-P-CCNC: 5 U/L (ref 0–45)
AST SERPL W P-5'-P-CCNC: 8 U/L (ref 0–45)
AST SERPL W P-5'-P-CCNC: 9 U/L (ref 0–45)
AST SERPL-CCNC: 32 U/L (ref 14–36)
BASOPHILS # BLD AUTO: 0 10E9/L (ref 0–0.2)
BASOPHILS # BLD AUTO: 0.1 10E9/L (ref 0–0.2)
BASOPHILS NFR BLD AUTO: 0.1 %
BASOPHILS NFR BLD AUTO: 0.2 %
BASOPHILS NFR BLD AUTO: 0.3 %
BASOPHILS NFR BLD AUTO: 0.4 %
BASOPHILS NFR BLD AUTO: 0.5 %
BASOPHILS NFR BLD AUTO: 0.7 %
BILIRUB SERPL-MCNC: 0.2 MG/DL (ref 0.2–1.3)
BILIRUB SERPL-MCNC: 0.2 MG/DL (ref 0.2–1.3)
BILIRUB SERPL-MCNC: 0.3 MG/DL (ref 0.2–1.3)
BILIRUB SERPL-MCNC: 0.4 MG/DL (ref 0.2–1.3)
BILIRUB SERPL-MCNC: 0.4 MG/DL (ref 0.2–1.3)
BLD GP AB SCN SERPL QL: NORMAL
BLOOD BANK CMNT PATIENT-IMP: NORMAL
BUN SERPL-MCNC: 11 MG/DL (ref 7–30)
BUN SERPL-MCNC: 13 MG/DL (ref 7–30)
BUN SERPL-MCNC: 13 MG/DL (ref 7–30)
BUN SERPL-MCNC: 16 MG/DL (ref 7–30)
BUN SERPL-MCNC: 16 MG/DL (ref 7–30)
BUN SERPL-MCNC: 17 MG/DL (ref 7–30)
BUN SERPL-MCNC: 18 MG/DL (ref 7–30)
BUN SERPL-MCNC: 19 MG/DL (ref 7–30)
BUN SERPL-MCNC: 19 MG/DL (ref 7–30)
BUN SERPL-MCNC: 21 MG/DL (ref 7–30)
BUN SERPL-MCNC: 24 MG/DL (ref 7–30)
CALCIUM SERPL-MCNC: 10.1 MG/DL (ref 8.5–10.1)
CALCIUM SERPL-MCNC: 10.2 MG/DL (ref 8.5–10.1)
CALCIUM SERPL-MCNC: 10.3 MG/DL (ref 8.5–10.1)
CALCIUM SERPL-MCNC: 10.5 MG/DL (ref 8.5–10.1)
CALCIUM SERPL-MCNC: 10.6 MG/DL (ref 8.5–10.1)
CALCIUM SERPL-MCNC: 10.6 MG/DL (ref 8.5–10.1)
CALCIUM SERPL-MCNC: 9.3 MG/DL (ref 8.5–10.1)
CALCIUM SERPL-MCNC: 9.4 MG/DL (ref 8.5–10.1)
CALCIUM SERPL-MCNC: 9.6 MG/DL (ref 8.5–10.1)
CALCIUM SERPL-MCNC: 9.6 MG/DL (ref 8.5–10.1)
CALCIUM SERPL-MCNC: 9.7 MG/DL (ref 8.5–10.1)
CHLORIDE SERPL-SCNC: 102 MMOL/L (ref 94–109)
CHLORIDE SERPL-SCNC: 105 MMOL/L (ref 94–109)
CHLORIDE SERPL-SCNC: 105 MMOL/L (ref 94–109)
CHLORIDE SERPL-SCNC: 106 MMOL/L (ref 94–109)
CHLORIDE SERPL-SCNC: 107 MMOL/L (ref 94–109)
CHLORIDE SERPL-SCNC: 108 MMOL/L (ref 94–109)
CHLORIDE SERPL-SCNC: 108 MMOL/L (ref 94–109)
CO2 SERPL-SCNC: 23 MMOL/L (ref 20–32)
CO2 SERPL-SCNC: 25 MMOL/L (ref 20–32)
CO2 SERPL-SCNC: 26 MMOL/L (ref 20–32)
CO2 SERPL-SCNC: 27 MMOL/L (ref 20–32)
CO2 SERPL-SCNC: 28 MMOL/L (ref 20–32)
CO2 SERPL-SCNC: 28 MMOL/L (ref 20–32)
CO2 SERPL-SCNC: 29 MMOL/L (ref 20–32)
CREAT SERPL-MCNC: 0.59 MG/DL (ref 0.52–1.04)
CREAT SERPL-MCNC: 0.59 MG/DL (ref 0.52–1.04)
CREAT SERPL-MCNC: 0.6 MG/DL (ref 0.52–1.04)
CREAT SERPL-MCNC: 0.6 MG/DL (ref 0.52–1.04)
CREAT SERPL-MCNC: 0.64 MG/DL (ref 0.52–1.04)
CREAT SERPL-MCNC: 0.65 MG/DL (ref 0.52–1.04)
CREAT SERPL-MCNC: 0.68 MG/DL (ref 0.52–1.04)
CREAT SERPL-MCNC: 0.69 MG/DL (ref 0.52–1.04)
CREAT SERPL-MCNC: 0.76 MG/DL (ref 0.52–1.04)
CREAT SERPL-MCNC: 0.84 MG/DL (ref 0.52–1.04)
CREAT SERPL-MCNC: 0.95 MG/DL (ref 0.52–1.04)
CREAT SERPL-MCNC: 0.97 MG/DL (ref 0.52–1.04)
DIFFERENTIAL METHOD BLD: ABNORMAL
DIFFERENTIAL METHOD BLD: NORMAL
EOSINOPHIL # BLD AUTO: 0 10E9/L (ref 0–0.7)
EOSINOPHIL # BLD AUTO: 0.1 10E9/L (ref 0–0.7)
EOSINOPHIL NFR BLD AUTO: 0 %
EOSINOPHIL NFR BLD AUTO: 0 %
EOSINOPHIL NFR BLD AUTO: 0.2 %
EOSINOPHIL NFR BLD AUTO: 0.4 %
EOSINOPHIL NFR BLD AUTO: 0.7 %
EOSINOPHIL NFR BLD AUTO: 0.7 %
EOSINOPHIL NFR BLD AUTO: 1.1 %
EOSINOPHIL NFR BLD AUTO: 1.1 %
EOSINOPHIL NFR BLD AUTO: 1.3 %
ERYTHROCYTE [DISTWIDTH] IN BLOOD BY AUTOMATED COUNT: 11.9 % (ref 10–15)
ERYTHROCYTE [DISTWIDTH] IN BLOOD BY AUTOMATED COUNT: 12 % (ref 10–15)
ERYTHROCYTE [DISTWIDTH] IN BLOOD BY AUTOMATED COUNT: 12.2 % (ref 10–15)
ERYTHROCYTE [DISTWIDTH] IN BLOOD BY AUTOMATED COUNT: 12.7 % (ref 10–15)
ERYTHROCYTE [DISTWIDTH] IN BLOOD BY AUTOMATED COUNT: 13.3 % (ref 10–15)
ERYTHROCYTE [DISTWIDTH] IN BLOOD BY AUTOMATED COUNT: 13.5 % (ref 10–15)
ERYTHROCYTE [DISTWIDTH] IN BLOOD BY AUTOMATED COUNT: 13.6 % (ref 10–15)
ERYTHROCYTE [DISTWIDTH] IN BLOOD BY AUTOMATED COUNT: 13.8 % (ref 10–15)
ERYTHROCYTE [DISTWIDTH] IN BLOOD BY AUTOMATED COUNT: 14.5 % (ref 10–15)
ERYTHROCYTE [DISTWIDTH] IN BLOOD BY AUTOMATED COUNT: 14.8 % (ref 10–15)
ERYTHROCYTE [DISTWIDTH] IN BLOOD BY AUTOMATED COUNT: 16.1 % (ref 10–15)
ERYTHROCYTE [DISTWIDTH] IN BLOOD BY AUTOMATED COUNT: 16.3 % (ref 10–15)
ERYTHROCYTE [DISTWIDTH] IN BLOOD BY AUTOMATED COUNT: 16.5 % (ref 10–15)
ERYTHROCYTE [DISTWIDTH] IN BLOOD BY AUTOMATED COUNT: 16.6 % (ref 10–15)
GFR SERPL CREATININE-BSD FRML MDRD: 70 ML/MIN/{1.73_M2}
GFR SERPL CREATININE-BSD FRML MDRD: 73 ML/MIN/{1.73_M2}
GFR SERPL CREATININE-BSD FRML MDRD: 84 ML/MIN/{1.73_M2}
GFR SERPL CREATININE-BSD FRML MDRD: >60 ML/MIN/1.73M2
GFR SERPL CREATININE-BSD FRML MDRD: >90 ML/MIN/{1.73_M2}
GLUCOSE BLDC GLUCOMTR-MCNC: 120 MG/DL (ref 70–99)
GLUCOSE BLDC GLUCOMTR-MCNC: 124 MG/DL (ref 70–99)
GLUCOSE BLDC GLUCOMTR-MCNC: 127 MG/DL (ref 70–99)
GLUCOSE BLDC GLUCOMTR-MCNC: 139 MG/DL (ref 70–99)
GLUCOSE SERPL-MCNC: 102 MG/DL (ref 70–99)
GLUCOSE SERPL-MCNC: 104 MG/DL (ref 70–99)
GLUCOSE SERPL-MCNC: 108 MG/DL (ref 70–99)
GLUCOSE SERPL-MCNC: 115 MG/DL (ref 75–100)
GLUCOSE SERPL-MCNC: 117 MG/DL (ref 70–99)
GLUCOSE SERPL-MCNC: 120 MG/DL (ref 70–99)
GLUCOSE SERPL-MCNC: 139 MG/DL (ref 70–99)
GLUCOSE SERPL-MCNC: 145 MG/DL (ref 70–99)
GLUCOSE SERPL-MCNC: 79 MG/DL (ref 70–99)
GLUCOSE SERPL-MCNC: 83 MG/DL (ref 70–99)
GLUCOSE SERPL-MCNC: 84 MG/DL (ref 70–99)
GLUCOSE SERPL-MCNC: 86 MG/DL (ref 70–99)
HBV CORE AB SERPL QL IA: NONREACTIVE
HBV SURFACE AG SERPL QL IA: NONREACTIVE
HCT VFR BLD AUTO: 34.9 % (ref 35–47)
HCT VFR BLD AUTO: 35.5 % (ref 35–47)
HCT VFR BLD AUTO: 37.3 % (ref 35–47)
HCT VFR BLD AUTO: 38.9 % (ref 35–47)
HCT VFR BLD AUTO: 39.5 % (ref 35–47)
HCT VFR BLD AUTO: 40.5 % (ref 35–47)
HCT VFR BLD AUTO: 40.7 % (ref 35–47)
HCT VFR BLD AUTO: 42.2 % (ref 35–47)
HCT VFR BLD AUTO: 42.9 % (ref 35–47)
HCT VFR BLD AUTO: 43 % (ref 35–47)
HCT VFR BLD AUTO: 43.4 % (ref 35–47)
HCT VFR BLD AUTO: 43.6 % (ref 35–47)
HCT VFR BLD AUTO: 43.7 % (ref 35–47)
HCT VFR BLD AUTO: 44 % (ref 35–47)
HGB BLD-MCNC: 11.6 G/DL (ref 11.7–15.7)
HGB BLD-MCNC: 11.7 G/DL (ref 11.7–15.7)
HGB BLD-MCNC: 12.2 G/DL (ref 11.7–15.7)
HGB BLD-MCNC: 12.6 G/DL (ref 11.7–15.7)
HGB BLD-MCNC: 12.8 G/DL (ref 11.7–15.7)
HGB BLD-MCNC: 13.4 G/DL (ref 11.7–15.7)
HGB BLD-MCNC: 13.4 G/DL (ref 11.7–15.7)
HGB BLD-MCNC: 13.6 G/DL (ref 11.7–15.7)
HGB BLD-MCNC: 13.8 G/DL (ref 11.7–15.7)
HGB BLD-MCNC: 14.1 G/DL (ref 11.7–15.7)
HGB BLD-MCNC: 14.4 G/DL (ref 11.7–15.7)
HGB BLD-MCNC: 14.4 G/DL (ref 11.7–15.7)
HGB BLD-MCNC: 14.5 G/DL (ref 11.7–15.7)
HGB BLD-MCNC: 14.5 G/DL (ref 11.7–15.7)
IMM GRANULOCYTES # BLD: 0 10E9/L (ref 0–0.4)
IMM GRANULOCYTES # BLD: 0.1 10E9/L (ref 0–0.4)
IMM GRANULOCYTES NFR BLD: 0.3 %
IMM GRANULOCYTES NFR BLD: 0.6 %
IMM GRANULOCYTES NFR BLD: 1 %
IMM GRANULOCYTES NFR BLD: 1.2 %
IMM GRANULOCYTES NFR BLD: 1.2 %
IMM GRANULOCYTES NFR BLD: 1.4 %
IMM GRANULOCYTES NFR BLD: 2 %
INR PPP: 1.04 (ref 0.86–1.14)
INTERPRETATION ECG - MUSE: NORMAL
LYMPHOCYTES # BLD AUTO: 0.4 10E9/L (ref 0.8–5.3)
LYMPHOCYTES # BLD AUTO: 0.6 10E9/L (ref 0.8–5.3)
LYMPHOCYTES # BLD AUTO: 0.6 10E9/L (ref 0.8–5.3)
LYMPHOCYTES # BLD AUTO: 0.7 10E9/L (ref 0.8–5.3)
LYMPHOCYTES # BLD AUTO: 0.8 10E9/L (ref 0.8–5.3)
LYMPHOCYTES # BLD AUTO: 1.1 10E9/L (ref 0.8–5.3)
LYMPHOCYTES # BLD AUTO: 2 10E9/L (ref 0.8–5.3)
LYMPHOCYTES # BLD AUTO: 2.1 10E9/L (ref 0.8–5.3)
LYMPHOCYTES # BLD AUTO: 2.2 10E9/L (ref 0.8–5.3)
LYMPHOCYTES NFR BLD AUTO: 10.3 %
LYMPHOCYTES NFR BLD AUTO: 12.1 %
LYMPHOCYTES NFR BLD AUTO: 12.2 %
LYMPHOCYTES NFR BLD AUTO: 20.4 %
LYMPHOCYTES NFR BLD AUTO: 21 %
LYMPHOCYTES NFR BLD AUTO: 24.2 %
LYMPHOCYTES NFR BLD AUTO: 4.1 %
LYMPHOCYTES NFR BLD AUTO: 5.4 %
LYMPHOCYTES NFR BLD AUTO: 8 %
LYMPHOCYTES NFR BLD AUTO: 8.9 %
LYMPHOCYTES NFR BLD AUTO: 9.1 %
MAGNESIUM SERPL-MCNC: 1.7 MG/DL (ref 1.6–2.3)
MAGNESIUM SERPL-MCNC: 1.9 MG/DL (ref 1.6–2.3)
MAGNESIUM SERPL-MCNC: 2.2 MG/DL (ref 1.6–2.3)
MCH RBC QN AUTO: 29.4 PG (ref 26.5–33)
MCH RBC QN AUTO: 29.6 PG (ref 26.5–33)
MCH RBC QN AUTO: 29.7 PG (ref 26.5–33)
MCH RBC QN AUTO: 29.8 PG (ref 26.5–33)
MCH RBC QN AUTO: 30 PG (ref 26.5–33)
MCH RBC QN AUTO: 30 PG (ref 26.5–33)
MCH RBC QN AUTO: 30.2 PG (ref 26.5–33)
MCH RBC QN AUTO: 30.3 PG (ref 26.5–33)
MCH RBC QN AUTO: 30.4 PG (ref 26.5–33)
MCH RBC QN AUTO: 30.5 PG (ref 26.5–33)
MCH RBC QN AUTO: 31.3 PG (ref 26.5–33)
MCH RBC QN AUTO: 31.5 PG (ref 26.5–33)
MCH RBC QN AUTO: 32.2 PG (ref 26.5–33)
MCH RBC QN AUTO: 33.1 PG (ref 26.5–33)
MCHC RBC AUTO-ENTMCNC: 30.9 G/DL (ref 31.5–36.5)
MCHC RBC AUTO-ENTMCNC: 31.9 G/DL (ref 31.5–36.5)
MCHC RBC AUTO-ENTMCNC: 32.7 G/DL (ref 31.5–36.5)
MCHC RBC AUTO-ENTMCNC: 32.7 G/DL (ref 31.5–36.5)
MCHC RBC AUTO-ENTMCNC: 32.9 G/DL (ref 31.5–36.5)
MCHC RBC AUTO-ENTMCNC: 33 G/DL (ref 31.5–36.5)
MCHC RBC AUTO-ENTMCNC: 33 G/DL (ref 31.5–36.5)
MCHC RBC AUTO-ENTMCNC: 33.1 G/DL (ref 31.5–36.5)
MCHC RBC AUTO-ENTMCNC: 33.2 G/DL (ref 31.5–36.5)
MCHC RBC AUTO-ENTMCNC: 33.3 G/DL (ref 31.5–36.5)
MCHC RBC AUTO-ENTMCNC: 33.4 G/DL (ref 31.5–36.5)
MCHC RBC AUTO-ENTMCNC: 33.5 G/DL (ref 31.5–36.5)
MCV RBC AUTO: 90 FL (ref 78–100)
MCV RBC AUTO: 90 FL (ref 78–100)
MCV RBC AUTO: 91 FL (ref 78–100)
MCV RBC AUTO: 91 FL (ref 78–100)
MCV RBC AUTO: 92 FL (ref 78–100)
MCV RBC AUTO: 92 FL (ref 78–100)
MCV RBC AUTO: 93 FL (ref 78–100)
MCV RBC AUTO: 95 FL (ref 78–100)
MCV RBC AUTO: 96 FL (ref 78–100)
MCV RBC AUTO: 99 FL (ref 78–100)
MONOCYTES # BLD AUTO: 0.3 10E9/L (ref 0–1.3)
MONOCYTES # BLD AUTO: 0.3 10E9/L (ref 0–1.3)
MONOCYTES # BLD AUTO: 0.4 10E9/L (ref 0–1.3)
MONOCYTES # BLD AUTO: 0.5 10E9/L (ref 0–1.3)
MONOCYTES # BLD AUTO: 0.5 10E9/L (ref 0–1.3)
MONOCYTES # BLD AUTO: 0.6 10E9/L (ref 0–1.3)
MONOCYTES # BLD AUTO: 0.6 10E9/L (ref 0–1.3)
MONOCYTES # BLD AUTO: 0.7 10E9/L (ref 0–1.3)
MONOCYTES # BLD AUTO: 0.7 10E9/L (ref 0–1.3)
MONOCYTES NFR BLD AUTO: 3.7 %
MONOCYTES NFR BLD AUTO: 4.2 %
MONOCYTES NFR BLD AUTO: 4.6 %
MONOCYTES NFR BLD AUTO: 4.6 %
MONOCYTES NFR BLD AUTO: 4.8 %
MONOCYTES NFR BLD AUTO: 5.9 %
MONOCYTES NFR BLD AUTO: 6 %
MONOCYTES NFR BLD AUTO: 6.4 %
MONOCYTES NFR BLD AUTO: 6.6 %
MONOCYTES NFR BLD AUTO: 7.2 %
MONOCYTES NFR BLD AUTO: 8.3 %
NEUTROPHILS # BLD AUTO: 10.3 10E9/L (ref 1.6–8.3)
NEUTROPHILS # BLD AUTO: 4.2 10E9/L (ref 1.6–8.3)
NEUTROPHILS # BLD AUTO: 5.1 10E9/L (ref 1.6–8.3)
NEUTROPHILS # BLD AUTO: 5.5 10E9/L (ref 1.6–8.3)
NEUTROPHILS # BLD AUTO: 5.8 10E9/L (ref 1.6–8.3)
NEUTROPHILS # BLD AUTO: 6.2 10E9/L (ref 1.6–8.3)
NEUTROPHILS # BLD AUTO: 6.7 10E9/L (ref 1.6–8.3)
NEUTROPHILS # BLD AUTO: 7.1 10E9/L (ref 1.6–8.3)
NEUTROPHILS # BLD AUTO: 7.8 10E9/L (ref 1.6–8.3)
NEUTROPHILS # BLD AUTO: 8.9 10E9/L (ref 1.6–8.3)
NEUTROPHILS # BLD AUTO: 9.7 10E9/L (ref 1.6–8.3)
NEUTROPHILS NFR BLD AUTO: 65.9 %
NEUTROPHILS NFR BLD AUTO: 70.5 %
NEUTROPHILS NFR BLD AUTO: 72.3 %
NEUTROPHILS NFR BLD AUTO: 80.2 %
NEUTROPHILS NFR BLD AUTO: 80.6 %
NEUTROPHILS NFR BLD AUTO: 82 %
NEUTROPHILS NFR BLD AUTO: 83.4 %
NEUTROPHILS NFR BLD AUTO: 84 %
NEUTROPHILS NFR BLD AUTO: 85.7 %
NEUTROPHILS NFR BLD AUTO: 90.1 %
NEUTROPHILS NFR BLD AUTO: 90.2 %
NRBC # BLD AUTO: 0 10*3/UL
NRBC BLD AUTO-RTO: 0 /100
PHOSPHATE SERPL-MCNC: 2.5 MG/DL (ref 2.5–4.5)
PHOSPHATE SERPL-MCNC: 3 MG/DL (ref 2.5–4.5)
PHOSPHATE SERPL-MCNC: 3.1 MG/DL (ref 2.5–4.5)
PLATELET # BLD AUTO: 100 10E9/L (ref 150–450)
PLATELET # BLD AUTO: 103 10E9/L (ref 150–450)
PLATELET # BLD AUTO: 115 10E9/L (ref 150–450)
PLATELET # BLD AUTO: 163 10E9/L (ref 150–450)
PLATELET # BLD AUTO: 185 10E9/L (ref 150–450)
PLATELET # BLD AUTO: 211 10E9/L (ref 150–450)
PLATELET # BLD AUTO: 213 10E9/L (ref 150–450)
PLATELET # BLD AUTO: 225 10E9/L (ref 150–450)
PLATELET # BLD AUTO: 236 10E9/L (ref 150–450)
PLATELET # BLD AUTO: 250 10E9/L (ref 150–450)
PLATELET # BLD AUTO: 262 10E9/L (ref 150–450)
PLATELET # BLD AUTO: 272 10E9/L (ref 150–450)
PLATELET # BLD AUTO: 292 10E9/L (ref 150–450)
PLATELET # BLD AUTO: 297 10E9/L (ref 150–450)
POTASSIUM SERPL-SCNC: 3.8 MMOL/L (ref 3.4–5.3)
POTASSIUM SERPL-SCNC: 3.8 MMOL/L (ref 3.4–5.3)
POTASSIUM SERPL-SCNC: 3.9 MMOL/L (ref 3.4–5.3)
POTASSIUM SERPL-SCNC: 3.9 MMOL/L (ref 3.4–5.3)
POTASSIUM SERPL-SCNC: 4.1 MMOL/L (ref 3.5–5.1)
POTASSIUM SERPL-SCNC: 4.3 MMOL/L (ref 3.4–5.3)
POTASSIUM SERPL-SCNC: 4.4 MMOL/L (ref 3.4–5.3)
POTASSIUM SERPL-SCNC: 4.5 MMOL/L (ref 3.4–5.3)
POTASSIUM SERPL-SCNC: 4.8 MMOL/L (ref 3.4–5.3)
POTASSIUM SERPL-SCNC: 4.9 MMOL/L (ref 3.4–5.3)
PROT SERPL-MCNC: 6.9 G/DL (ref 6.8–8.8)
PROT SERPL-MCNC: 7.1 G/DL (ref 6.8–8.8)
PROT SERPL-MCNC: 7.4 G/DL (ref 6.8–8.8)
PROT SERPL-MCNC: 7.5 G/DL (ref 6.8–8.8)
PROT SERPL-MCNC: 7.9 G/DL (ref 6.8–8.8)
PROT SERPL-MCNC: 8.3 G/DL (ref 6.8–8.8)
RBC # BLD AUTO: 3.87 10E12/L (ref 3.8–5.2)
RBC # BLD AUTO: 3.93 10E12/L (ref 3.8–5.2)
RBC # BLD AUTO: 4 10E12/L (ref 3.8–5.2)
RBC # BLD AUTO: 4.23 10E12/L (ref 3.8–5.2)
RBC # BLD AUTO: 4.25 10E12/L (ref 3.8–5.2)
RBC # BLD AUTO: 4.35 10E12/L (ref 3.8–5.2)
RBC # BLD AUTO: 4.43 10E12/L (ref 3.8–5.2)
RBC # BLD AUTO: 4.47 10E12/L (ref 3.8–5.2)
RBC # BLD AUTO: 4.5 10E12/L (ref 3.8–5.2)
RBC # BLD AUTO: 4.6 10E12/L (ref 3.8–5.2)
RBC # BLD AUTO: 4.61 10E12/L (ref 3.8–5.2)
RBC # BLD AUTO: 4.63 10E12/L (ref 3.8–5.2)
RBC # BLD AUTO: 4.64 10E12/L (ref 3.8–5.2)
RBC # BLD AUTO: 4.64 10E12/L (ref 3.8–5.2)
SARS-COV-2 RNA SPEC QL NAA+PROBE: NOT DETECTED
SODIUM SERPL-SCNC: 133 MMOL/L (ref 133–144)
SODIUM SERPL-SCNC: 136 MMOL/L (ref 133–144)
SODIUM SERPL-SCNC: 137 MMOL/L (ref 133–144)
SODIUM SERPL-SCNC: 137 MMOL/L (ref 133–144)
SODIUM SERPL-SCNC: 138 MMOL/L (ref 133–144)
SODIUM SERPL-SCNC: 138 MMOL/L (ref 133–144)
SODIUM SERPL-SCNC: 139 MMOL/L (ref 133–144)
SODIUM SERPL-SCNC: 140 MMOL/L (ref 133–144)
SPECIMEN EXP DATE BLD: NORMAL
SPECIMEN SOURCE: NORMAL
WBC # BLD AUTO: 10.7 10E9/L (ref 4–11)
WBC # BLD AUTO: 10.7 10E9/L (ref 4–11)
WBC # BLD AUTO: 10.8 10E9/L (ref 4–11)
WBC # BLD AUTO: 12 10E9/L (ref 4–11)
WBC # BLD AUTO: 5.2 10E9/L (ref 4–11)
WBC # BLD AUTO: 6.4 10E9/L (ref 4–11)
WBC # BLD AUTO: 6.7 10E9/L (ref 4–11)
WBC # BLD AUTO: 7.4 10E9/L (ref 4–11)
WBC # BLD AUTO: 8.4 10E9/L (ref 4–11)
WBC # BLD AUTO: 8.5 10E9/L (ref 4–11)
WBC # BLD AUTO: 8.8 10E9/L (ref 4–11)
WBC # BLD AUTO: 9.6 10E9/L (ref 4–11)
WBC # BLD AUTO: 9.7 10E9/L (ref 4–11)
WBC # BLD AUTO: 9.9 10E9/L (ref 4–11)

## 2020-01-01 PROCEDURE — 250N000011 HC RX IP 250 OP 636: Performed by: PSYCHIATRY & NEUROLOGY

## 2020-01-01 PROCEDURE — 999N001193 HC VIDEO/TELEPHONE VISIT; NO CHARGE

## 2020-01-01 PROCEDURE — 99214 OFFICE O/P EST MOD 30 MIN: CPT | Mod: 95 | Performed by: PSYCHIATRY & NEUROLOGY

## 2020-01-01 PROCEDURE — 40001009 ZZH VIDEO/TELEPHONE VISIT; NO CHARGE

## 2020-01-01 PROCEDURE — 80069 RENAL FUNCTION PANEL: CPT | Performed by: NEUROLOGICAL SURGERY

## 2020-01-01 PROCEDURE — 74019 RADEX ABDOMEN 2 VIEWS: CPT

## 2020-01-01 PROCEDURE — A9585 GADOBUTROL INJECTION: HCPCS | Performed by: PSYCHIATRY & NEUROLOGY

## 2020-01-01 PROCEDURE — 00000146 ZZHCL STATISTIC GLUCOSE BY METER IP

## 2020-01-01 PROCEDURE — 85025 COMPLETE CBC W/AUTO DIFF WBC: CPT | Performed by: PSYCHIATRY & NEUROLOGY

## 2020-01-01 PROCEDURE — 99215 OFFICE O/P EST HI 40 MIN: CPT | Mod: 95 | Performed by: PSYCHIATRY & NEUROLOGY

## 2020-01-01 PROCEDURE — 85610 PROTHROMBIN TIME: CPT | Performed by: NEUROLOGICAL SURGERY

## 2020-01-01 PROCEDURE — 25500064 ZZH RX 255 OP 636: Performed by: NEUROLOGICAL SURGERY

## 2020-01-01 PROCEDURE — 20000004 ZZH R&B ICU UMMC

## 2020-01-01 PROCEDURE — 99222 1ST HOSP IP/OBS MODERATE 55: CPT | Mod: GC | Performed by: INTERNAL MEDICINE

## 2020-01-01 PROCEDURE — 96365 THER/PROPH/DIAG IV INF INIT: CPT

## 2020-01-01 PROCEDURE — 36415 COLL VENOUS BLD VENIPUNCTURE: CPT | Performed by: PSYCHIATRY & NEUROLOGY

## 2020-01-01 PROCEDURE — 80048 BASIC METABOLIC PNL TOTAL CA: CPT | Performed by: STUDENT IN AN ORGANIZED HEALTH CARE EDUCATION/TRAINING PROGRAM

## 2020-01-01 PROCEDURE — 85027 COMPLETE CBC AUTOMATED: CPT | Performed by: NEUROLOGICAL SURGERY

## 2020-01-01 PROCEDURE — 83735 ASSAY OF MAGNESIUM: CPT | Performed by: NEUROLOGICAL SURGERY

## 2020-01-01 PROCEDURE — 95714 VEEG EA 12-26 HR UNMNTR: CPT

## 2020-01-01 PROCEDURE — 25000125 ZZHC RX 250: Performed by: STUDENT IN AN ORGANIZED HEALTH CARE EDUCATION/TRAINING PROGRAM

## 2020-01-01 PROCEDURE — 25800030 ZZH RX IP 258 OP 636: Performed by: STUDENT IN AN ORGANIZED HEALTH CARE EDUCATION/TRAINING PROGRAM

## 2020-01-01 PROCEDURE — 255N000002 HC RX 255 OP 636: Performed by: PSYCHIATRY & NEUROLOGY

## 2020-01-01 PROCEDURE — 258N000003 HC RX IP 258 OP 636: Performed by: PSYCHIATRY & NEUROLOGY

## 2020-01-01 PROCEDURE — 85730 THROMBOPLASTIN TIME PARTIAL: CPT | Performed by: NEUROLOGICAL SURGERY

## 2020-01-01 PROCEDURE — 80053 COMPREHEN METABOLIC PANEL: CPT | Performed by: PSYCHIATRY & NEUROLOGY

## 2020-01-01 PROCEDURE — 70553 MRI BRAIN STEM W/O & W/DYE: CPT

## 2020-01-01 PROCEDURE — 36415 COLL VENOUS BLD VENIPUNCTURE: CPT | Performed by: NEUROLOGICAL SURGERY

## 2020-01-01 PROCEDURE — 95711 VEEG 2-12 HR UNMONITORED: CPT

## 2020-01-01 PROCEDURE — 25500064 ZZH RX 255 OP 636: Performed by: PSYCHIATRY & NEUROLOGY

## 2020-01-01 PROCEDURE — 93010 ELECTROCARDIOGRAM REPORT: CPT | Performed by: INTERNAL MEDICINE

## 2020-01-01 PROCEDURE — 25000128 H RX IP 250 OP 636: Performed by: PSYCHIATRY & NEUROLOGY

## 2020-01-01 PROCEDURE — U0003 INFECTIOUS AGENT DETECTION BY NUCLEIC ACID (DNA OR RNA); SEVERE ACUTE RESPIRATORY SYNDROME CORONAVIRUS 2 (SARS-COV-2) (CORONAVIRUS DISEASE [COVID-19]), AMPLIFIED PROBE TECHNIQUE, MAKING USE OF HIGH THROUGHPUT TECHNOLOGIES AS DESCRIBED BY CMS-2020-01-R: HCPCS | Performed by: STUDENT IN AN ORGANIZED HEALTH CARE EDUCATION/TRAINING PROGRAM

## 2020-01-01 PROCEDURE — 25000132 ZZH RX MED GY IP 250 OP 250 PS 637: Performed by: STUDENT IN AN ORGANIZED HEALTH CARE EDUCATION/TRAINING PROGRAM

## 2020-01-01 PROCEDURE — 25500064 ZZH RX 255 OP 636: Performed by: INTERNAL MEDICINE

## 2020-01-01 PROCEDURE — 86850 RBC ANTIBODY SCREEN: CPT | Performed by: NEUROLOGICAL SURGERY

## 2020-01-01 PROCEDURE — 25000131 ZZH RX MED GY IP 250 OP 636 PS 637: Performed by: STUDENT IN AN ORGANIZED HEALTH CARE EDUCATION/TRAINING PROGRAM

## 2020-01-01 PROCEDURE — 86901 BLOOD TYPING SEROLOGIC RH(D): CPT | Performed by: NEUROLOGICAL SURGERY

## 2020-01-01 PROCEDURE — 96413 CHEMO IV INFUSION 1 HR: CPT

## 2020-01-01 PROCEDURE — 40000986 XR CHEST PORT 1 VW

## 2020-01-01 PROCEDURE — 80048 BASIC METABOLIC PNL TOTAL CA: CPT | Performed by: NEUROLOGICAL SURGERY

## 2020-01-01 PROCEDURE — 25000132 ZZH RX MED GY IP 250 OP 250 PS 637: Performed by: NURSE PRACTITIONER

## 2020-01-01 PROCEDURE — 93306 TTE W/DOPPLER COMPLETE: CPT

## 2020-01-01 PROCEDURE — 86704 HEP B CORE ANTIBODY TOTAL: CPT | Performed by: PSYCHIATRY & NEUROLOGY

## 2020-01-01 PROCEDURE — 25800030 ZZH RX IP 258 OP 636: Performed by: PSYCHIATRY & NEUROLOGY

## 2020-01-01 PROCEDURE — 87340 HEPATITIS B SURFACE AG IA: CPT | Performed by: PSYCHIATRY & NEUROLOGY

## 2020-01-01 PROCEDURE — 25000128 H RX IP 250 OP 636: Performed by: STUDENT IN AN ORGANIZED HEALTH CARE EDUCATION/TRAINING PROGRAM

## 2020-01-01 PROCEDURE — 93005 ELECTROCARDIOGRAM TRACING: CPT

## 2020-01-01 PROCEDURE — 86900 BLOOD TYPING SEROLOGIC ABO: CPT | Performed by: NEUROLOGICAL SURGERY

## 2020-01-01 PROCEDURE — 93306 TTE W/DOPPLER COMPLETE: CPT | Mod: 26 | Performed by: INTERNAL MEDICINE

## 2020-01-01 PROCEDURE — 85027 COMPLETE CBC AUTOMATED: CPT | Performed by: STUDENT IN AN ORGANIZED HEALTH CARE EDUCATION/TRAINING PROGRAM

## 2020-01-01 PROCEDURE — A9585 GADOBUTROL INJECTION: HCPCS | Performed by: NEUROLOGICAL SURGERY

## 2020-01-01 RX ORDER — LACOSAMIDE 100 MG/1
100 TABLET ORAL 2 TIMES DAILY
Qty: 60 TABLET | Refills: 5 | Status: SHIPPED | OUTPATIENT
Start: 2020-01-01 | End: 2020-01-01

## 2020-01-01 RX ORDER — LACOSAMIDE 100 MG/1
150 TABLET ORAL 2 TIMES DAILY
Qty: 60 TABLET | Refills: 5 | Status: SHIPPED | OUTPATIENT
Start: 2020-01-01 | End: 2020-01-01

## 2020-01-01 RX ORDER — MEPERIDINE HYDROCHLORIDE 25 MG/ML
25 INJECTION INTRAMUSCULAR; INTRAVENOUS; SUBCUTANEOUS EVERY 30 MIN PRN
Status: CANCELLED | OUTPATIENT
Start: 2020-01-01

## 2020-01-01 RX ORDER — METHYLPREDNISOLONE SODIUM SUCCINATE 125 MG/2ML
125 INJECTION, POWDER, LYOPHILIZED, FOR SOLUTION INTRAMUSCULAR; INTRAVENOUS
Status: CANCELLED
Start: 2020-01-01

## 2020-01-01 RX ORDER — PANTOPRAZOLE SODIUM 40 MG/1
40 TABLET, DELAYED RELEASE ORAL
Qty: 30 TABLET | Refills: 0 | Status: SHIPPED | OUTPATIENT
Start: 2020-01-01 | End: 2020-01-01

## 2020-01-01 RX ORDER — NALOXONE HYDROCHLORIDE 0.4 MG/ML
.1-.4 INJECTION, SOLUTION INTRAMUSCULAR; INTRAVENOUS; SUBCUTANEOUS
Status: CANCELLED | OUTPATIENT
Start: 2020-01-01

## 2020-01-01 RX ORDER — DEXAMETHASONE 2 MG/1
2 TABLET ORAL EVERY 8 HOURS SCHEDULED
Status: DISCONTINUED | OUTPATIENT
Start: 2020-01-01 | End: 2020-01-01 | Stop reason: HOSPADM

## 2020-01-01 RX ORDER — PANTOPRAZOLE SODIUM 40 MG/1
40 TABLET, DELAYED RELEASE ORAL
Status: DISCONTINUED | OUTPATIENT
Start: 2020-01-01 | End: 2020-01-01 | Stop reason: HOSPADM

## 2020-01-01 RX ORDER — DEXAMETHASONE 2 MG/1
2 TABLET ORAL EVERY 8 HOURS
Qty: 90 TABLET | Refills: 0 | Status: SHIPPED | OUTPATIENT
Start: 2020-01-01 | End: 2020-01-01

## 2020-01-01 RX ORDER — ALBUTEROL SULFATE 0.83 MG/ML
2.5 SOLUTION RESPIRATORY (INHALATION)
Status: CANCELLED | OUTPATIENT
Start: 2020-01-01

## 2020-01-01 RX ORDER — ALBUTEROL SULFATE 90 UG/1
1-2 AEROSOL, METERED RESPIRATORY (INHALATION)
Status: CANCELLED
Start: 2020-01-01

## 2020-01-01 RX ORDER — DEXAMETHASONE 2 MG/1
4 TABLET ORAL
Qty: 60 TABLET | Refills: 1 | Status: SHIPPED | OUTPATIENT
Start: 2020-01-01 | End: 2020-01-01

## 2020-01-01 RX ORDER — SODIUM CHLORIDE 9 MG/ML
1000 INJECTION, SOLUTION INTRAVENOUS CONTINUOUS PRN
Status: CANCELLED
Start: 2020-01-01

## 2020-01-01 RX ORDER — EPINEPHRINE 1 MG/ML
0.3 INJECTION, SOLUTION INTRAMUSCULAR; SUBCUTANEOUS EVERY 5 MIN PRN
Status: CANCELLED | OUTPATIENT
Start: 2020-01-01

## 2020-01-01 RX ORDER — LORAZEPAM 2 MG/ML
0.5 INJECTION INTRAMUSCULAR EVERY 4 HOURS PRN
Status: CANCELLED
Start: 2020-01-01

## 2020-01-01 RX ORDER — DEXAMETHASONE 2 MG/1
2 TABLET ORAL 3 TIMES DAILY
Qty: 60 TABLET | Refills: 0 | Status: SHIPPED | OUTPATIENT
Start: 2020-01-01 | End: 2021-01-01

## 2020-01-01 RX ORDER — LACOSAMIDE 100 MG/1
150 TABLET ORAL 2 TIMES DAILY
Qty: 60 TABLET | Refills: 5 | Status: SHIPPED | OUTPATIENT
Start: 2020-01-01 | End: 2021-01-01

## 2020-01-01 RX ORDER — LIDOCAINE 40 MG/G
CREAM TOPICAL
Status: DISCONTINUED | OUTPATIENT
Start: 2020-01-01 | End: 2020-01-01

## 2020-01-01 RX ORDER — PROCHLORPERAZINE MALEATE 10 MG
10 TABLET ORAL EVERY 6 HOURS PRN
Status: DISCONTINUED | OUTPATIENT
Start: 2020-01-01 | End: 2020-01-01 | Stop reason: HOSPADM

## 2020-01-01 RX ORDER — ONDANSETRON 4 MG/1
4 TABLET, FILM COATED ORAL AT BEDTIME
Qty: 30 TABLET | Refills: 3 | Status: SHIPPED | OUTPATIENT
Start: 2020-01-01

## 2020-01-01 RX ORDER — GADOBUTROL 604.72 MG/ML
7 INJECTION INTRAVENOUS ONCE
Status: COMPLETED | OUTPATIENT
Start: 2020-01-01 | End: 2020-01-01

## 2020-01-01 RX ORDER — POTASSIUM CHLORIDE 1.5 G/1.58G
20-40 POWDER, FOR SOLUTION ORAL
Status: DISCONTINUED | OUTPATIENT
Start: 2020-01-01 | End: 2020-01-01 | Stop reason: HOSPADM

## 2020-01-01 RX ORDER — DEXAMETHASONE 4 MG/1
4 TABLET ORAL 3 TIMES DAILY
Qty: 90 TABLET | Refills: 1 | Status: SHIPPED | OUTPATIENT
Start: 2020-01-01 | End: 2020-01-01

## 2020-01-01 RX ORDER — LEVETIRACETAM 500 MG/1
500 TABLET ORAL 2 TIMES DAILY
Status: DISCONTINUED | OUTPATIENT
Start: 2020-01-01 | End: 2020-01-01 | Stop reason: HOSPADM

## 2020-01-01 RX ORDER — DIPHENHYDRAMINE HYDROCHLORIDE 50 MG/ML
50 INJECTION INTRAMUSCULAR; INTRAVENOUS
Status: CANCELLED
Start: 2020-01-01

## 2020-01-01 RX ORDER — ONDANSETRON 2 MG/ML
4-8 INJECTION INTRAMUSCULAR; INTRAVENOUS EVERY 6 HOURS PRN
Status: DISCONTINUED | OUTPATIENT
Start: 2020-01-01 | End: 2020-01-01 | Stop reason: HOSPADM

## 2020-01-01 RX ORDER — HEPARIN SODIUM,PORCINE 10 UNIT/ML
5-10 VIAL (ML) INTRAVENOUS EVERY 24 HOURS
Status: DISCONTINUED | OUTPATIENT
Start: 2020-01-01 | End: 2020-01-01 | Stop reason: HOSPADM

## 2020-01-01 RX ORDER — GADOBUTROL 604.72 MG/ML
6.5 INJECTION INTRAVENOUS ONCE
Status: COMPLETED | OUTPATIENT
Start: 2020-01-01 | End: 2020-01-01

## 2020-01-01 RX ORDER — DEXAMETHASONE 4 MG/1
4 TABLET ORAL EVERY 6 HOURS SCHEDULED
Status: DISCONTINUED | OUTPATIENT
Start: 2020-01-01 | End: 2020-01-01

## 2020-01-01 RX ORDER — DEXAMETHASONE 2 MG/1
2 TABLET ORAL
Qty: 30 TABLET | Refills: 1 | Status: SHIPPED | OUTPATIENT
Start: 2020-01-01 | End: 2020-01-01

## 2020-01-01 RX ORDER — SODIUM CHLORIDE 9 MG/ML
INJECTION, SOLUTION INTRAVENOUS CONTINUOUS
Status: DISCONTINUED | OUTPATIENT
Start: 2020-01-01 | End: 2020-01-01 | Stop reason: HOSPADM

## 2020-01-01 RX ORDER — HEPARIN SODIUM,PORCINE 10 UNIT/ML
2-5 VIAL (ML) INTRAVENOUS
Status: DISCONTINUED | OUTPATIENT
Start: 2020-01-01 | End: 2020-01-01 | Stop reason: HOSPADM

## 2020-01-01 RX ORDER — MAGNESIUM SULFATE HEPTAHYDRATE 40 MG/ML
4 INJECTION, SOLUTION INTRAVENOUS EVERY 4 HOURS PRN
Status: DISCONTINUED | OUTPATIENT
Start: 2020-01-01 | End: 2020-01-01 | Stop reason: HOSPADM

## 2020-01-01 RX ORDER — TEMOZOLOMIDE 140 MG/1
150 CAPSULE ORAL DAILY
Qty: 10 CAPSULE | Refills: 0 | Status: SHIPPED | OUTPATIENT
Start: 2020-01-01 | End: 2020-01-01

## 2020-01-01 RX ORDER — POTASSIUM CL/LIDO/0.9 % NACL 10MEQ/0.1L
10 INTRAVENOUS SOLUTION, PIGGYBACK (ML) INTRAVENOUS
Status: DISCONTINUED | OUTPATIENT
Start: 2020-01-01 | End: 2020-01-01 | Stop reason: HOSPADM

## 2020-01-01 RX ORDER — MAGNESIUM SULFATE HEPTAHYDRATE 40 MG/ML
2 INJECTION, SOLUTION INTRAVENOUS DAILY PRN
Status: DISCONTINUED | OUTPATIENT
Start: 2020-01-01 | End: 2020-01-01 | Stop reason: HOSPADM

## 2020-01-01 RX ORDER — DEXAMETHASONE 2 MG/1
2 TABLET ORAL EVERY 8 HOURS SCHEDULED
Status: DISCONTINUED | OUTPATIENT
Start: 2020-01-01 | End: 2020-01-01

## 2020-01-01 RX ORDER — DEXAMETHASONE 2 MG/1
2 TABLET ORAL 3 TIMES DAILY
Qty: 60 TABLET | Refills: 0 | Status: SHIPPED | OUTPATIENT
Start: 2020-01-01 | End: 2020-01-01

## 2020-01-01 RX ORDER — PANTOPRAZOLE SODIUM 40 MG/1
40 TABLET, DELAYED RELEASE ORAL
Qty: 90 TABLET | Refills: 0 | Status: SHIPPED | OUTPATIENT
Start: 2020-01-01 | End: 2020-01-01

## 2020-01-01 RX ORDER — POTASSIUM CHLORIDE 29.8 MG/ML
20 INJECTION INTRAVENOUS
Status: DISCONTINUED | OUTPATIENT
Start: 2020-01-01 | End: 2020-01-01 | Stop reason: HOSPADM

## 2020-01-01 RX ORDER — ONDANSETRON 4 MG/1
4 TABLET, FILM COATED ORAL AT BEDTIME
Qty: 30 TABLET | Refills: 3 | Status: SHIPPED | OUTPATIENT
Start: 2020-01-01 | End: 2020-01-01

## 2020-01-01 RX ORDER — POTASSIUM CHLORIDE 1.5 G/1.58G
20 POWDER, FOR SOLUTION ORAL ONCE
Status: COMPLETED | OUTPATIENT
Start: 2020-01-01 | End: 2020-01-01

## 2020-01-01 RX ORDER — HYDRALAZINE HYDROCHLORIDE 20 MG/ML
10-20 INJECTION INTRAMUSCULAR; INTRAVENOUS EVERY 30 MIN PRN
Status: DISCONTINUED | OUTPATIENT
Start: 2020-01-01 | End: 2020-01-01 | Stop reason: HOSPADM

## 2020-01-01 RX ORDER — POTASSIUM CHLORIDE 7.45 MG/ML
10 INJECTION INTRAVENOUS
Status: DISCONTINUED | OUTPATIENT
Start: 2020-01-01 | End: 2020-01-01 | Stop reason: HOSPADM

## 2020-01-01 RX ORDER — PANTOPRAZOLE SODIUM 40 MG/1
TABLET, DELAYED RELEASE ORAL
COMMUNITY
Start: 2020-01-01

## 2020-01-01 RX ORDER — TEMOZOLOMIDE 140 MG/1
75 CAPSULE ORAL DAILY
Qty: 23 CAPSULE | Refills: 0 | Status: SHIPPED | OUTPATIENT
Start: 2020-01-01 | End: 2020-01-01

## 2020-01-01 RX ORDER — DEXAMETHASONE 4 MG/1
4 TABLET ORAL EVERY 8 HOURS SCHEDULED
Status: DISCONTINUED | OUTPATIENT
Start: 2020-01-01 | End: 2020-01-01 | Stop reason: HOSPADM

## 2020-01-01 RX ORDER — POTASSIUM CHLORIDE 750 MG/1
20-40 TABLET, EXTENDED RELEASE ORAL
Status: DISCONTINUED | OUTPATIENT
Start: 2020-01-01 | End: 2020-01-01 | Stop reason: HOSPADM

## 2020-01-01 RX ORDER — DOPAMINE HYDROCHLORIDE 160 MG/100ML
3 INJECTION, SOLUTION INTRAVENOUS CONTINUOUS
Status: DISCONTINUED | OUTPATIENT
Start: 2020-01-01 | End: 2020-01-01

## 2020-01-01 RX ORDER — GADOBUTROL 604.72 MG/ML
7.5 INJECTION INTRAVENOUS ONCE
Status: COMPLETED | OUTPATIENT
Start: 2020-01-01 | End: 2020-01-01

## 2020-01-01 RX ORDER — ATROPINE SULFATE 0.1 MG/ML
1 INJECTION INTRAVENOUS
Status: DISCONTINUED | OUTPATIENT
Start: 2020-01-01 | End: 2020-01-01 | Stop reason: HOSPADM

## 2020-01-01 RX ORDER — SENNOSIDES 8.6 MG
1 TABLET ORAL PRN
COMMUNITY

## 2020-01-01 RX ORDER — PROCHLORPERAZINE 25 MG
25 SUPPOSITORY, RECTAL RECTAL EVERY 12 HOURS PRN
Status: DISCONTINUED | OUTPATIENT
Start: 2020-01-01 | End: 2020-01-01 | Stop reason: HOSPADM

## 2020-01-01 RX ORDER — AMOXICILLIN 250 MG
1 CAPSULE ORAL 2 TIMES DAILY
Status: DISCONTINUED | OUTPATIENT
Start: 2020-01-01 | End: 2020-01-01 | Stop reason: HOSPADM

## 2020-01-01 RX ORDER — ONDANSETRON 4 MG/1
4-8 TABLET, ORALLY DISINTEGRATING ORAL EVERY 6 HOURS PRN
Status: DISCONTINUED | OUTPATIENT
Start: 2020-01-01 | End: 2020-01-01 | Stop reason: HOSPADM

## 2020-01-01 RX ORDER — LIDOCAINE 40 MG/G
CREAM TOPICAL
Status: DISCONTINUED | OUTPATIENT
Start: 2020-01-01 | End: 2020-01-01 | Stop reason: HOSPADM

## 2020-01-01 RX ORDER — HEPARIN SODIUM,PORCINE 10 UNIT/ML
5-10 VIAL (ML) INTRAVENOUS
Status: DISCONTINUED | OUTPATIENT
Start: 2020-01-01 | End: 2020-01-01 | Stop reason: HOSPADM

## 2020-01-01 RX ORDER — SULFAMETHOXAZOLE/TRIMETHOPRIM 800-160 MG
1 TABLET ORAL
Qty: 12 TABLET | Refills: 0 | Status: SHIPPED | OUTPATIENT
Start: 2020-01-01 | End: 2020-01-01

## 2020-01-01 RX ORDER — LABETALOL 20 MG/4 ML (5 MG/ML) INTRAVENOUS SYRINGE
10-40 EVERY 10 MIN PRN
Status: DISCONTINUED | OUTPATIENT
Start: 2020-01-01 | End: 2020-01-01 | Stop reason: HOSPADM

## 2020-01-01 RX ORDER — LEVETIRACETAM 500 MG/1
500 TABLET ORAL 2 TIMES DAILY
Qty: 60 TABLET | Refills: 0 | Status: SHIPPED | OUTPATIENT
Start: 2020-01-01 | End: 2020-01-01

## 2020-01-01 RX ORDER — DEXAMETHASONE 4 MG/1
4 TABLET ORAL EVERY 8 HOURS
Qty: 2 TABLET | Refills: 0 | Status: SHIPPED | OUTPATIENT
Start: 2020-01-01 | End: 2020-01-01

## 2020-01-01 RX ORDER — POLYETHYLENE GLYCOL 3350 17 G/17G
17 POWDER, FOR SOLUTION ORAL DAILY
Status: DISCONTINUED | OUTPATIENT
Start: 2020-01-01 | End: 2020-01-01 | Stop reason: HOSPADM

## 2020-01-01 RX ORDER — LACOSAMIDE 100 MG/1
TABLET ORAL
Qty: 49 TABLET | Refills: 0 | Status: SHIPPED | OUTPATIENT
Start: 2020-01-01 | End: 2020-01-01

## 2020-01-01 RX ADMIN — BEVACIZUMAB-AWWB 700 MG: 400 INJECTION, SOLUTION INTRAVENOUS at 13:25

## 2020-01-01 RX ADMIN — SODIUM CHLORIDE: 9 INJECTION, SOLUTION INTRAVENOUS at 04:54

## 2020-01-01 RX ADMIN — SODIUM CHLORIDE 250 ML: 9 INJECTION, SOLUTION INTRAVENOUS at 13:50

## 2020-01-01 RX ADMIN — LEVETIRACETAM 500 MG: 500 TABLET, FILM COATED ORAL at 20:35

## 2020-01-01 RX ADMIN — BEVACIZUMAB-AWWB 700 MG: 400 INJECTION, SOLUTION INTRAVENOUS at 13:47

## 2020-01-01 RX ADMIN — SODIUM PHOSPHATE, MONOBASIC, MONOHYDRATE AND SODIUM PHOSPHATE, DIBASIC, ANHYDROUS 10 MMOL: 276; 142 INJECTION, SOLUTION INTRAVENOUS at 09:50

## 2020-01-01 RX ADMIN — BEVACIZUMAB-AWWB 700 MG: 400 INJECTION, SOLUTION INTRAVENOUS at 13:52

## 2020-01-01 RX ADMIN — GADOBUTROL 7 ML: 604.72 INJECTION INTRAVENOUS at 14:05

## 2020-01-01 RX ADMIN — DEXAMETHASONE 4 MG: 4 TABLET ORAL at 05:41

## 2020-01-01 RX ADMIN — SODIUM CHLORIDE 250 ML: 9 INJECTION, SOLUTION INTRAVENOUS at 13:58

## 2020-01-01 RX ADMIN — PANTOPRAZOLE SODIUM 40 MG: 40 TABLET, DELAYED RELEASE ORAL at 07:57

## 2020-01-01 RX ADMIN — SODIUM CHLORIDE: 9 INJECTION, SOLUTION INTRAVENOUS at 11:02

## 2020-01-01 RX ADMIN — HUMAN ALBUMIN MICROSPHERES AND PERFLUTREN 5 ML: 10; .22 INJECTION, SOLUTION INTRAVENOUS at 10:30

## 2020-01-01 RX ADMIN — DEXAMETHASONE 4 MG: 4 TABLET ORAL at 23:13

## 2020-01-01 RX ADMIN — GADOBUTROL 6.5 ML: 604.72 INJECTION INTRAVENOUS at 08:52

## 2020-01-01 RX ADMIN — DOPAMINE HYDROCHLORIDE 3 MCG/KG/MIN: 160 INJECTION, SOLUTION INTRAVENOUS at 19:59

## 2020-01-01 RX ADMIN — BEVACIZUMAB-AWWB 700 MG: 400 INJECTION, SOLUTION INTRAVENOUS at 13:41

## 2020-01-01 RX ADMIN — ONDANSETRON 4 MG: 2 INJECTION INTRAMUSCULAR; INTRAVENOUS at 21:18

## 2020-01-01 RX ADMIN — POTASSIUM CHLORIDE 20 MEQ: 1.5 POWDER, FOR SOLUTION ORAL at 11:02

## 2020-01-01 RX ADMIN — DEXAMETHASONE 4 MG: 4 TABLET ORAL at 13:04

## 2020-01-01 RX ADMIN — DEXAMETHASONE 4 MG: 4 TABLET ORAL at 00:07

## 2020-01-01 RX ADMIN — SODIUM CHLORIDE: 9 INJECTION, SOLUTION INTRAVENOUS at 23:11

## 2020-01-01 RX ADMIN — BEVACIZUMAB-AWWB 700 MG: 400 INJECTION, SOLUTION INTRAVENOUS at 13:44

## 2020-01-01 RX ADMIN — BEVACIZUMAB-AWWB 700 MG: 400 INJECTION, SOLUTION INTRAVENOUS at 13:40

## 2020-01-01 RX ADMIN — LEVETIRACETAM 500 MG: 500 TABLET, FILM COATED ORAL at 12:56

## 2020-01-01 RX ADMIN — DEXAMETHASONE 4 MG: 4 TABLET ORAL at 05:42

## 2020-01-01 RX ADMIN — DEXAMETHASONE 4 MG: 4 TABLET ORAL at 17:48

## 2020-01-01 RX ADMIN — LIDOCAINE HYDROCHLORIDE 1 ML: 20 INJECTION, SOLUTION INFILTRATION; PERINEURAL at 12:16

## 2020-01-01 RX ADMIN — GADOBUTROL 7 ML: 604.72 INJECTION INTRAVENOUS at 10:29

## 2020-01-01 RX ADMIN — LEVETIRACETAM 500 MG: 500 TABLET, FILM COATED ORAL at 07:57

## 2020-01-01 RX ADMIN — DEXAMETHASONE 4 MG: 4 TABLET ORAL at 12:56

## 2020-01-01 RX ADMIN — PANTOPRAZOLE SODIUM 40 MG: 40 TABLET, DELAYED RELEASE ORAL at 07:48

## 2020-01-01 RX ADMIN — GADOBUTROL 7.5 ML: 604.72 INJECTION INTRAVENOUS at 21:05

## 2020-01-01 RX ADMIN — SODIUM CHLORIDE 250 ML: 9 INJECTION, SOLUTION INTRAVENOUS at 15:15

## 2020-01-01 RX ADMIN — SODIUM CHLORIDE 250 ML: 9 INJECTION, SOLUTION INTRAVENOUS at 13:52

## 2020-01-01 RX ADMIN — DEXAMETHASONE 4 MG: 4 TABLET ORAL at 17:55

## 2020-01-01 ASSESSMENT — PAIN SCALES - GENERAL
PAINLEVEL: NO PAIN (0)

## 2020-01-01 ASSESSMENT — VISUAL ACUITY
OU: GLASSES
OU: BASELINE
OU: GLASSES
OU: GLASSES
OU: BASELINE
OU: BASELINE
OU: GLASSES
OU: NORMAL ACUITY
OU: GLASSES
OU: GLASSES

## 2020-01-01 ASSESSMENT — ACTIVITIES OF DAILY LIVING (ADL)
ADLS_ACUITY_SCORE: 14
ADLS_ACUITY_SCORE: 12
ADLS_ACUITY_SCORE: 14
ADLS_ACUITY_SCORE: 14
ADLS_ACUITY_SCORE: 12
ADLS_ACUITY_SCORE: 14
ADLS_ACUITY_SCORE: 12

## 2020-01-01 ASSESSMENT — MIFFLIN-ST. JEOR: SCORE: 1380.71

## 2020-01-06 ENCOUNTER — TRANSFERRED RECORDS (OUTPATIENT)
Dept: HEALTH INFORMATION MANAGEMENT | Facility: CLINIC | Age: 45
End: 2020-01-06

## 2020-01-13 ENCOUNTER — TUMOR CONFERENCE (OUTPATIENT)
Dept: ONCOLOGY | Facility: CLINIC | Age: 45
End: 2020-01-13

## 2020-01-13 NOTE — TUMOR CONFERENCE
Tumor Conference Information  Tumor Conference:  Brain  Specialties Present:  Medical oncology, Pathology, Radiology, Radiation oncology, Surgery  Patient Status:  A current patient  Pathology:  Not Discussed  Treatment to Date:  Surgical intervention(s)  Clinical Trial Eligibility:  Not discussed  Recommended Plan:  Concurrent chemoradiation (Comment: no disease progression or recurrence on recent MRI; still highly recommend adjuvant chemoradiation treatment; reimage in 2 months with follow up if patient continues to decline adjuvant therapies)  Did the review exceed 30 minutes?:  did not           Documentation / Disclaimer Cancer Tumor Board Note  Cancer tumor board recommendations do not override what is determined to be reasonable care and treatment, which is dependent on the circumstances of a patient's case; the patient's medical, social, and personal concerns; and the clinical judgment of the oncologist [physician].

## 2020-02-25 DIAGNOSIS — C71.9 GLIOBLASTOMA (H): Primary | ICD-10-CM

## 2020-02-26 ENCOUNTER — PATIENT OUTREACH (OUTPATIENT)
Dept: ONCOLOGY | Facility: CLINIC | Age: 45
End: 2020-02-26

## 2020-02-26 NOTE — PROGRESS NOTES
Order for brain MRI faxed to [phone]. Fax confirmation received.     Sara Melgar RN, BSN  Care Coordinator  [facility]

## 2020-03-02 ENCOUNTER — TRANSFERRED RECORDS (OUTPATIENT)
Dept: HEALTH INFORMATION MANAGEMENT | Facility: CLINIC | Age: 45
End: 2020-03-02

## 2020-03-30 ENCOUNTER — TUMOR CONFERENCE (OUTPATIENT)
Dept: ONCOLOGY | Facility: CLINIC | Age: 45
End: 2020-03-30

## 2020-03-30 NOTE — TUMOR CONFERENCE
Tumor Conference Information  Tumor Conference:  Brain  Specialties Present:  Medical oncology, Pathology, Radiology, Radiation oncology, Surgery  Patient Status:  A current patient  Pathology:  Not Discussed  Treatment to Date:  Biopsy  Clinical Trial Eligibility:  Not discussed  Recommended Plan:  Observation (see comment) (Comment: Stable on recent imaging. Follow up with MRI in 4 months)  Did the review exceed 30 minutes?:  did not           Documentation / Disclaimer Cancer Tumor Board Note  Cancer tumor board recommendations do not override what is determined to be reasonable care and treatment, which is dependent on the circumstances of a patient's case; the patient's medical, social, and personal concerns; and the clinical judgment of the oncologist [physician].

## 2020-05-28 NOTE — PROGRESS NOTES
Patient needs follow up MRI on or around 7/2/20. Orders faxed to [facility] [phone]. Requested to call patient to schedule. Fax confirmation received.    Sara Melgar, RN, BSN  Neuro-Oncology Care Coordinator  [facility]

## 2020-07-01 PROBLEM — D49.6 BRAIN TUMOR (H): Status: ACTIVE | Noted: 2020-01-01

## 2020-07-01 NOTE — CONSULTS
EP Consult                                                               2020  Africa Dempsey MRN: [id number]  Age: 44 year old, : 1975        Reason for consult:      Asystole        Assessment and Recommendation:     43 yo with past medical history significant for GBM s/p resecction 10/1/19 followed by holistic treatment rather than chemoradion who presents as transfer with Dizziness, N/V, and syncopal episodes with documented asystole.  Does endorse dehydration and exhausting herself the day prior. Brain MRI reveals progression of disease with surrounding edema. Has been receiving dexamethasone. Review of tele while she has been here has not shown any arrhythmia or pauses. She has been on dopamine since yesterday. Echo shows no structural heart disease. Review of tele from OSH reveals slowing of the sinus beat followed by complete asystole with absence of P waves. This can sometimes be seen with vasovagal episodes which would not benefit from pacemaker.    - observe off of dopamine   - keep atropine at bedside   - please save any tele strips with recurrent bradycardia/asystole   - no indication for device at this time     Mac Clement MD  PGY-4, Cardiology Fellow     EP STAFF NOTE  Patient seen and examined and management plan personally reviewed with the patient. I agree with the note above by the CV/EP fellow.  Briefly, vasovagal syncope likely triggered by hypovolemia. IVF, monitor off pressors, can discontinue pressors after hydration and steroid treatment  Dewayne Meyer MD [facility]  Cardiology - Electrophysiology       History of Present Illness:     Africa Dempsey is a 43 yo with past medical history significant for GBM s/p resecction 10/1/19 followed by holistic treatment rather than chemoradion who presents as transfer with Dizziness, N/V, and syncopal episodes with documented asystole.  Patient was in her usual state of health until today.  She had undergone resection back  in October 2019 and elected for holistic medical therapy following.  She was doing rather well with a normal brain MRI back in March.  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  Today she woke up in her normal state of health, however on her way to her scheduled for repeat MRI suddenly developed nausea and vomited.  She subsequently passed out for approximately 10 seconds.  She was then brought to the emergency department where while in the waiting room she had another episode of nausea vomiting and syncope.  She was then brought back into the emergency department room, where with the provider present she developed bradycardia to asystole (reportedly no preceding symptoms) and required 15 to 20 seconds of CPR before she regained consciousness and normal sinus rhythm.  This happened again requiring CPR, in 1-2 additional times requiring atropine at outside hospital.  Out of concern for seizure and/or neurogenic edema she was started on high-dose dexamethasone and transferred to [facility].    Africa states she still feels groggy with word finding difficulties.  She still feels occasionally lightheaded, however the nausea is much improved and no further vomiting.  Only mild blurry vision that comes and goes.  She denies abdominal pain.  She denies any chest pain, shortness of breath, orthopnea, lower extremity edema.  No recent fevers or chills.  She is not particularly hungry.  She has no prior cardiac history and used to be very active until her GBM diagnosis.  Her father is a history of CABG but no prior electrolyte abnormalities in the family.  She denies tobacco use, alcohol use, illicit drug use.  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    A 13-point ROS is negative except as mentioned above      Past Medical History:     Patient Active Problem List   Diagnosis     Anemia     Menorrhagia     Glioblastoma (H)     Brain tumor (H)         Past  Surgical History:      Past Surgical History:   Procedure Laterality Date     OPTICAL TRACKING SYSTEM CRANIOTOMY Left 10/1/2019    Procedure: Stealth Assisted Left Frontal Cranitomy for Tumor Resection;  Surgeon: Sandip Yo MD;  Location:  OR         Social History:     Social History     Socioeconomic History     Marital status:      Spouse name: Not on file     Number of children: Not on file     Years of education: Not on file     Highest education level: Not on file   Occupational History     Not on file   Social Needs     Financial resource strain: Not on file     Food insecurity     Worry: Not on file     Inability: Not on file     Transportation needs     Medical: Not on file     Non-medical: Not on file   Tobacco Use     Smoking status: Never Smoker     Smokeless tobacco: Never Used   Substance and Sexual Activity     Alcohol use: Not Currently     Drug use: Not Currently     Sexual activity: Not Currently   Lifestyle     Physical activity     Days per week: Not on file     Minutes per session: Not on file     Stress: Not on file   Relationships     Social connections     Talks on phone: Not on file     Gets together: Not on file     Attends Yazidism service: Not on file     Active member of club or organization: Not on file     Attends meetings of clubs or organizations: Not on file     Relationship status: Not on file     Intimate partner violence     Fear of current or ex partner: Not on file     Emotionally abused: Not on file     Physically abused: Not on file     Forced sexual activity: Not on file   Other Topics Concern     Not on file   Social History Narrative     Not on file         Family History:     No family history on file.      Allergies:     Allergies   Allergen Reactions     Amoxicillin Hives         Medications:     No current facility-administered medications on file prior to encounter.   oxyCODONE (ROXICODONE) 5 MG tablet, Take 1-2 tablets (5-10 mg) by mouth every  6 hours as needed for moderate to severe pain  senna-docusate (SENOKOT-S/PERICOLACE) 8.6-50 MG tablet, Take 1 tablet by mouth 2 times daily as needed for constipation (Patient not taking: Reported on 10/28/2019)            Physical Exam:     B/P: 109/64, T: 98.1, P: 85, R: Data Unavailable    Wt Readings from Last 4 Encounters:   10/28/19 69.3 kg (152 lb 12.8 oz)   10/02/19 65.1 kg (143 lb 8.3 oz)       No intake or output data in the 24 hours ending 07/01/20 1814    Gen: AA&Ox3, no acute distress  HEENT:AT/ NC, PERRL b/l, EOM grossly intact, mucous membranes pink, moist without plaque or exudate  PULM/THORAX: Clear to auscultation bilaterally, no rales/rhonchi/wheezes  CV:RRR, S1 and S2 appreciated, no extra heart sounds, murmurs or rub auscultated. No JVD  ABD: soft, nontender, nondistended. Normoactive bowel sounds  EXT: No edema, clubbing or cyanosis  NEURO: Some word finding difficulties.      Data:     Labs Reviewed on Admission    Troponin No results found for: TROPI, TROPONIN, TROPR, TROPN  BMPNo lab results found in last 7 days.  CBCNo lab results found in last 7 days.  INRNo lab results found in last 7 days.   Hepatic Panel   Lab Results   Component Value Date    AST 7 10/01/2019     Lab Results   Component Value Date    ALT 11 10/01/2019     No results found for: BILICONJ   Lab Results   Component Value Date    BILITOTAL 0.4 10/01/2019     Lab Results   Component Value Date    ALBUMIN 3.6 10/01/2019     Lab Results   Component Value Date    PROTTOTAL 7.4 10/01/2019      Lab Results   Component Value Date    ALKPHOS 72 10/01/2019           Most Recent Imaging:     EKG:       Echo:   None on file

## 2020-07-01 NOTE — PLAN OF CARE
Pt has cell phone and glasses.  brought her suitcase and clothing.  is holding onto patient's ring.

## 2020-07-01 NOTE — H&P
"[facility]       NEUROSURGERY H&P NOTE    HPI: Africa Dempsey is a 44 year old female who has a history of GBM s/p resection 10/1/2019 with Dr. Yo. She had elected to pursue holistic practices for treatment and did not undergo chemoradiation.    She states she woke up today (7/1) with word finding difficulty and feeling \"off.\" Presented to OSH where she, reportedly, had a syncopal event which led to cardiac arrest requiring CPR; achieved ROSC. Transferred to [facility] for further workup. She states she feels fatigued at this time. She denies any new symptoms now and feels at her baseline, outside of the fatigue.      PAST MEDICAL HISTORY: No past medical history on file.    PAST SURGICAL HISTORY:   Past Surgical History:   Procedure Laterality Date     OPTICAL TRACKING SYSTEM CRANIOTOMY Left 10/1/2019    Procedure: Stealth Assisted Left Frontal Cranitomy for Tumor Resection;  Surgeon: Sandip Yo MD;  Location:  OR       FAMILY HISTORY: No family history on file.    SOCIAL HISTORY:   Social History     Tobacco Use     Smoking status: Never Smoker     Smokeless tobacco: Never Used   Substance Use Topics     Alcohol use: Not Currently       MEDICATIONS:  Medications Prior to Admission   Medication Sig Dispense Refill Last Dose     oxyCODONE (ROXICODONE) 5 MG tablet Take 1-2 tablets (5-10 mg) by mouth every 6 hours as needed for moderate to severe pain 40 tablet 0      senna-docusate (SENOKOT-S/PERICOLACE) 8.6-50 MG tablet Take 1 tablet by mouth 2 times daily as needed for constipation (Patient not taking: Reported on 10/28/2019) 60 tablet 0        Allergies:  Allergies   Allergen Reactions     Amoxicillin Hives       ROS: 10 point ROS of systems were all negative except for pertinent positives noted in my HPI.    Physical exam:   Blood pressure 120/72, pulse 74, SpO2 98 %.  CV: HR and BP as noted above  PULM: breathing comfortably on room air  ABD: soft, " non-distended  NEUROLOGIC:  -- Awake; Alert; oriented x 3  -- Follows commands briskly  -- +repetition, and naming  -- Speech fluent, spontaneous. No aphasia or dysarthria. Very mild conversational word finding difficulty  -- no gaze preference. No apparent hemineglect.  Cranial Nerves:  -- PERRL 3-2mm bilat and brisk, extraocular movements intact  -- face symmetrical, tongue midline  -- sensory V1-V3 intact bilaterally  -- palate elevates symmetrically, uvula midline  -- hearing grossly intact bilat  -- Trapezii 5/5 strength bilat symmetric    Motor:  Normal bulk / tone; no tremor, rigidity, or bradykinesia.  No muscle wasting or fasciculations     Delt Bi Tri Hand Flexion/  Extension Iliopsoas Quadriceps Hamstrings Tibialis Anterior Gastroc    C5 C6 C7 C8/T1 L2 L3 L4-S1 L4 S1   R 5 5 5 5 5 5 5 5 5   L 5 5 5 5 5 5 5 5 5   Sensory:  intact to LT x 4 extremities     Reflexes:     Bi BR Arturo Pat Ach Bab    C5-6 C6 UMN L2-4 S1 UMN   R 2+ 2+ Norm 2+ 2+ Norm   L 2+ 2+ Norm 2+ 2+ Norm     Gait: Deferred      LABS:  No lab results found in last 7 days.    No lab results found in last 7 days.      IMAGING:  MRI pending    ASSESSMENT:  44 year old female with history of GBM and clean surveillance MRIs, last being 3/2020, who presents after a syncopal episode that led to cardiac arrest at OSH. She is at her neurologic baseline on admission. Work-up for her syncope and arrest is needed as well as assessment for recurrence, though it is felt that recurrence is unlikely at this point.    PLAN:  Admit to 4A  MRI with and without contrast, as well as stealth protocol  vEEG after MRI  Cardiology consult for arrest  Atropine at bedside  Dexamethasone 4mg q6h  Will discuss with Dr. Yo after MRI is completed    The patient was discussed with Dr. Lucas, neurosurgery staff, and they agree with the above.    Abdulkadir Brito MD  Neurosurgery Resident, PGY-2

## 2020-07-01 NOTE — PROGRESS NOTES
"SPIRITUAL HEALTH SERVICES  SPIRITUAL ASSESSMENT Progress Note  [facility] ([place] 4A   ON-CALL VISIT    REFERRAL SOURCE: Epic referral (family request).    Visit with patient Africa Dempsey and  Jim at bedside. Jim described witnessing Africa's cardiac arrest and how worried he is about her condition. They are awaiting further news from the team. 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    Their family and their [employer] nick are central to Africa's sense of meaning and coping. They are affiliated with [facility] in [place], and they have already been in touch with their , Natacha Shabazz. Africa requested prayer for continuing to \"speak life\" into herself, her partnership, and their relationships with their children, ages 21, 20, 17, and 13.     I offered spiritual/crisis support through reflective listening that affirmed emotions, experience, and meaning, as well as prayer.    PLAN: Spiritual Health Services is available as needed. Please page the on call  either via [employer] (Spiritual Health/[facility]) or by placing a STAT or ASAP Epic referral for Spiritual Health (which will roll to the on call pager).    Sruthi Peterson  Palliative   Pager [phone]      "

## 2020-07-01 NOTE — PLAN OF CARE
Major Shift Events: Admit from [place] ED at 1600. AMESJenny VOGT. AxOx4. Intermittent slurred speech and word finding difficulties. Sinus wayne 50s-60s. Atropine at bedside for HR <30. Afebrile. Normotensive. On room air. Voids spontaneously.    Plan: Awaiting lab results, MRI and EEG.

## 2020-07-02 NOTE — PROCEDURES
[facility], [place]    Triple Lumen PICC Placement    Date/Time: 7/2/2020 12:37 PM  Performed by: Brandy Suero RN  Authorized by: Chris Trevino MD   Indications: Access.    UNIVERSAL PROTOCOL   Site Marked: Yes  Prior Images Obtained and Reviewed:  Yes  Required items: Required blood products, implants, devices and special equipment available    Patient identity confirmed:  Verbally with patient  NA - No sedation, light sedation, or local anesthesia  Confirmation Checklist:  Patient's identity using two indicators, relevant allergies, procedure was appropriate and matched the consent or emergent situation and correct equipment/implants were available  Time out: Immediately prior to the procedure a time out was called    Universal Protocol: the Joint Commission Universal Protocol was followed    Preparation: Patient was prepped and draped in usual sterile fashion           ANESTHESIA    Local Anesthetic: Lidocaine 1% without epinephrine  Anesthetic Total (mL):  2      SEDATION    Patient Sedated: No        Preparation: skin prepped with ChloraPrep  Skin prep agent: skin prep agent completely dried prior to procedure  Sterile barriers: maximum sterile barriers were used: cap, mask, sterile gown, sterile gloves, and large sterile sheet  Hand hygiene: hand hygiene performed prior to central venous catheter insertion  Type of line used: Power PICC  Catheter type: triple lumen  Lumen type: non-valved  Catheter size: 6 Fr  : BioFlo.  Lot number: [id number]  Placement method: venipuncture, MST and tip confirmation system  Number of attempts: 1  Successful placement: yes  Orientation: right  Location: brachial vein (lateral)  Arm circumference: adults 10 cm  Extremity circumference: 25.5  Visible catheter length: 0.5  Internal length: 41.5 cm  Total catheter length: 42  Dressing and securement: blood cleaned with CHG, blood removed, chlorhexidine disc applied, glue, occlusive dressing  applied, sterile dressing applied, statlock and site cleaned  Post procedure assessment: blood return through all ports and placement verified by x-ray  PROCEDURE   Patient Tolerance:  Patient tolerated the procedure well with no immediate complications

## 2020-07-02 NOTE — PROGRESS NOTES
Major Shift Events:  Patient remains intact neurologically although has some word finding difficulty at times. Denies N/T. Endorses HA but did not want any pain medications. Remains on Dopamine infusion. HR 60s-70s. No ectopy or episodes of bradycardia. BP WNL. Voids to commode with stand by assist. NS @ 75ml/hr.  Jim at bedside.  Plan: Coordinate with neurosurgery and cardiology teams to develop POC. Monitor and assess for changes.    For vital signs and complete assessments, please see documentation flowsheets.

## 2020-07-02 NOTE — PROGRESS NOTES
Neurosurgery Progress Note  7/2/2020    Overnight events/subjective:   Underwent brain MRI demonstrating multifocal left-sided enhancing masses with surrounding vasogenic edema. Started on dopamine infusion per Cardiology given pulseless arrest.    O/ /66   Pulse 75   Temp 99.2  F (37.3  C) (Oral)   Resp 19   Wt 66.2 kg (146 lb)   SpO2 97%   BMI 22.20 kg/m    Exam:   Gen: Laying in bed, not in acute distress  MS: A&Ox3, Speech fluent and conversant   CN: Pupils round and reactive, extraocular movements intact, face symmetric, tongue midline, uvula & palate elevate symmetrically   Motor: 5/5 in b/l UE& LEs  Sensory: intact to light touch   No drift    Non labored breathing on room air    IMG:   MRI brain with and without contrast:  (Radiology read pending.)  Left frontal irregularly enhancing partially cystic mass extending from resection cavity to anterior genu of corpus callosum. Smaller rounded enhancing mass in superior left frontal white matter. Minimally enhancing mass in white matter of left temporal tip.    A/P: Africa Dempsey is a 44 year old woman with history of left frontal glioblastoma s/p resection 10/1/19 without adjuvant chemotherapy or radiation who presented 7/1 with word-finding difficulty, emesis, and an episode of pulseless arrest (ROSC within 1 minute).  Brain MRI at time of admission demonstrated multifocal left-sided tumors with surrounding vasogenic edema.    Discuss with Dr. Yo  Plan will likely be coordinated between Neurosurgery, Neuro-oncology, and Radiation Oncology  Goals of care discussion with patient (to date, has opted for holistic measures other than surgical resection)  Appreciate Cardiology recommendations  Dexamethasone 4mg q 6h  Video EEG in AM  PT/OT      -----------------------------------  Chris Trevino M.D.  Neurosurgery Resident, PGY-2    Please contact neurosurgery resident on call with questions.    Dial * * [phone], enter [phone] when prompted.

## 2020-07-02 NOTE — PROGRESS NOTES
Neuroscience Intensive Care Progress Note  2020    REASON FOR CRITICAL CARE ADMISSION: Brain tumor     ADMITTING PHYSICIAN: Eduardo Lucas MD     Date of Service (when I saw the patient): 20    ASSESSMENT: Africa Dempsey is a 44 year old female admitted on 2020 with a PMH of GBM s/p resection on 10/1/2019.    Of note, she had elected to pursue holistic practices for treatment and did not undergo chemoradiation.    She woke the morning of  with word finding difficulties. She presented to an OSH where she had a syncopal event which led to a PEA arrest requiring brief CPR with ROSC after 1 minute. She was transferred to [facility] for further care. MRI brain obtained on  demonstrating multifocal left-sided enhancing masses with surrounding vasogenic edema.     Problem List  1. PEA arrest  2. GBM    24 hour events:  PEA arrest, transfer to [facility]    24 Hour Vital Signs Summary:  Temperatures:  Current - Temp: 99.2  F (37.3  C); Max - Temp  Av.9  F (37.2  C)  Min: 98.1  F (36.7  C)  Max: 99.2  F (37.3  C)  Respiration range: Resp  Av.7  Min: 12  Max: 63  Pulse range: Pulse  Av.8  Min: 59  Max: 102  Blood pressure range: Systolic (24hrs), Av , Min:102 , Max:126   ; Diastolic (24hrs), Av, Min:58, Max:72    Pulse oximetry range: SpO2  Av.6 %  Min: 88 %  Max: 99 %    Ventilator Settings  Resp: 29  RA      Intake/Output Summary (Last 24 hours) at 2020 0731  Last data filed at 2020 0700  Gross per 24 hour   Intake 351.62 ml   Output 1330 ml   Net -978.38 ml       BP - Mean:  [73-90] 83    Current Medications:    dexamethasone  4 mg Oral Q6H MALLORY     pantoprazole  40 mg Oral QAM AC     polyethylene glycol  17 g Oral Daily     senna-docusate  1 tablet Oral BID     sodium chloride (PF)  3 mL Intracatheter Q8H       PRN Medications:  atropine, hydrALAZINE, labetalol, lidocaine 4%, lidocaine (buffered or not buffered), magnesium sulfate, magnesium sulfate, ondansetron  **OR** ondansetron, potassium chloride, potassium chloride with lidocaine, potassium chloride, potassium chloride, potassium chloride, prochlorperazine **OR** prochlorperazine **OR** prochlorperazine, sodium chloride (PF), sodium phosphate, sodium phosphate, sodium phosphate, sodium phosphate    Infusions:    DOPamine 3 mcg/kg/min (07/02/20 0400)     sodium chloride 75 mL/hr at 07/02/20 0454       Allergies   Allergen Reactions     Amoxicillin Hives       Physical Examination:  Vitals: /67   Pulse 75   Temp 99.2  F (37.3  C) (Oral)   Resp 29   Wt 65 kg (143 lb 4.8 oz)   SpO2 96%   BMI 21.79 kg/m    General: Adult female patient, lying in bed, NAD  HEENT: Normocephalic/Atraumatic, no icterus, Oral cavity/Oropharynx pink and moist  Cardiac: RRR  Chest: No SOB, pattern regular, unlabored, expansion symmetric, no retractions or use of accessory muscles  Abdomen: Soft, bowel sounds present  Extremities: Warm, no edema  Skin: No rash or lesion   Psych: Calm and cooperative  Neuro:  Mental status: Awake, alert, attentive, oriented to self, time, place, and circumstance. Clear speech, has difficulty with word finding.  Cranial nerves: VFF, PERRL, conjugate gaze, EOMI, pupils +4 round and reactive, facial sensation intact, face symmetric, shoulder shrug strong, tongue/uvula midline, no dysarthria.   Motor: Normal bulk and tone. No abnormal movements. 5/5 strength in 4/4 extremities.   Sensory: Intact to light touch x 4 extremities  Coordination: FNF and HS without ataxia or dysmetria. Rapid alternating movements intact.   Gait: ANA, deferred      Labs/Studies:  Recent Labs   Lab Test 07/02/20  0535 07/01/20  1802 10/02/19  0413    136 138   POTASSIUM 3.8 4.4 4.4   CHLORIDE 102 105 106   CO2 23 25 24   ANIONGAP 8 6 8   * 108* 118*   BUN 18 17 22   CR 0.60 0.60 0.78   HANH 9.3 9.4 9.8   WBC 10.7 8.4 16.4*   RBC 3.93 4.00 3.90   HGB 11.7 12.2 10.0*    225 258       Recent Labs   Lab Test  20  1802 10/01/19  0131 19   INR 1.04 1.15* 1.1   PTT 25 27  --          Imagin. MR Brain w/o & w/contrast on 20 at 2105      Assessment/Plan  Africa Dempsey is a 44 year old admitted on 2020 with word finding difficulties s/p PEA arrest with brief CPR at OSH.    Plan  Neuro:  #GBM  - Decadron 4 mg q6h  - Neurochecks q2h  - GOC discussion  - start keppra/AED pending NSG    #Syncopal episode  - Start vEEG today    CV:  - SBP < 140  - ECHO today  - Hydralazine and Labetalol PRN    #PEA arrest  - Dopamine gtt  - Cardiology following - recommending pacemaker    Pulm:  - Continuous pulse ox monitoring  - Maintain O2 saturations > 92%    GI/Nutrition:  - Diet: NPO  - Last BM--Pre admission  Bowel regimen: PRNs ordered     Renal:  - IV Fluids: NS @ 75 ml/hr  - BMP daily  - Electrolyte replacement protocol in place    Endo:  - No active issues  - Follow glucose levels    Heme:  - No active issues  - CBC daily    ID:  - No active issues  - Follow fever curve    PPX:  DVT Prophylaxis  - SCDs in place  - Hep 5000 q8h pending NSG  GI Prophylaxis  - Not indicated    Lines/Tubes/Drains:  - PIV x 2  - PICC line today    Code Status: Full    Dispo: ICU    The patient was seen and discussed with the attending, Dr. Shell.    Ashley DEUTSCH, CNP  NeuroCritical Care Nurse Practitioner  Pager: [phone]  Ascom: [phone] available M-F 0700 to 1500

## 2020-07-02 NOTE — CONSULTS
"Department of Radiation Oncology                   [place] [address]  [address] [place]  [address]  Office:  [phone]  Fax:  [phone]   Radiation Oncology Clinic  [address] [place] [address]  Phone:  [phone]  Fax:  [phone]     RE: Africa Dempsey : 1975   MRN: [id number] DARION: 2020       INPATIENT VISIT NOTE       PROBLEM: WHO grade IV glioblastoma, IDH wildtype, MGMT promoter unmethylated     HISTORY OF PRESENT ILLNESS: Ms. Dempsey is a 44 year old woman with a history of WHO grade IV glioblastoma status post gross total resection on 10/1/2020 with subsequent observation per patient preference who presented with syncope and cardiac arrest and was found to have tumor progression on imaging. She did not meet with a radiation oncologist at diagnosis and we are seeing her to recommend chemoradiotherapy.     Regarding her oncologic history, she presented with progressively worsening headaches in 2019. MRI brain on 2019 showed a left frontal rim enhancing lesion, and she underwent gross total resection by Dr. Yo on 10/1/2019. Pathology ([id number]) showed WHO grade IV glioblastoma, IDH wildtype, MGMT promoter unmethylated, with a TP53 mutation, [id number]. Post-operative MRI on 10/2/2020 was equivocal for residual disease vs post-surgical changes.     The patient then met with Dr. Hung in neuro-oncology on 10/28/2020. They discussed recommendations for adjuvant chemoradiotherapy and chemotherapy, and the patient elected for holistic/alternative medical treatment. She was followed with surveillance imaging on 2020 and 3/2/2020 that showed no evidence of progressive disease per my review.     On the morning of 2020, the patient reported feeling \"off\" and presented with word finding difficulty to an OSH. There she reportedly had a syncopal arrest and cardiac arrest, leading to CPR. She was transferred to [facility] for further management. On " admission, brain MRI on 7/1/2020 showed multifocal areas of enhancement in the resection cavity and elsewhere in the left brain. She was evaluated by cardiology who felt brain edema may have contributed to her arrhythmia. She was started on dopamine drip and echo is pending. They are considering temporary or permanent pacemaker placement and have additional recommendations pending.    She was seen by neurosurgery and started on dexamethasone 4 mg q6 hours per their recommendation. She is also undergoing a video EEG. Radiation oncology was consulted for recommendations.    On interview today, the patient reports she is at her neurologic baseline. She denies pain, nausea, speech difficulties, changes in hearing/vision/cognition, focal weakness, or changes in sensation. She reports the ability to walk and is feeling overall fairly well.      PMH:   Glioblastoma as per HPI  Asystole as per HPI    PSH:  Past Surgical History:   Procedure Laterality Date     OPTICAL TRACKING SYSTEM CRANIOTOMY Left 10/1/2019    Procedure: Stealth Assisted Left Frontal Cranitomy for Tumor Resection;  Surgeon: Sandip Yo MD;  Location: [facility] OR       MEDICATIONS:  Inpatient medications reviewed    ALLERGY:  Allergies   Allergen Reactions     Amoxicillin Hives       FAMILY HISTORY:  No family history of brain cancer    SOCIAL HISTORY  Social History     Tobacco Use     Smoking status: Never Smoker     Smokeless tobacco: Never Used   Substance Use Topics     Alcohol use: Not Currently   Lives in [place], [place] with her  who manages a [employer] resort. 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     ECOG PERFORMANCE STATUS: 3 (hospitalized, was 0 prior to admission)    HISTORY OF RADIATION: denies  IMPLANTED CARDIAC DEVICE: none, though cardiology may be planning placement  PREGNANCY RISK: not assessed    REVIEW OF SYMPTOMS:  A full 10-point review of systems was performed as per nursing note.  Pertinent negatives and  positives reviewed.    PHYSICAL EXAMINATION:    Gen: Alert, laying in bed in NAD  Eyes: EOMI, sclera anicteric  HENT      Head: NC/AT     Ears: No external auricular lesions     Nose/sinus: No rhinorrhea or epistaxis     Oral Cavity/Oropharynx: MMM, no thrush noted  Pulm: Breathing comfortably on room air, no audible wheezes or ronchi  CV: Well-perfused, no cyanosis  Abdominal: Soft, nondistended  Skin: Normal color and turgor  Neurologic/MSK: moves upper extremities, alert and oriented, reports no deficits  Psych: Appropriate mood and affect    IMPRESSION:  44 year old woman with glioblastoma, 7 months after gross total resection with no adjuvant therapy who presented with asystole and intracranial progression    RECOMMENDATION:  We discussed with the patient our recommendation for chemoradiotherapy for management of her recurrent glioblastoma. The radiotherapy will last 6 weeks. We described the risks, benefits, and logistics of treatment, including the common side effects and uncommon but potential severe toxicities. At this time, the patient is interested in therapy but is trying to decide where she would like treatment. She lives about 90 min from the nearest radiation center and about 2 hours from [facility]. We discussed she could be treated at [facility] as well, but the patient and her  have excluded [place] as an option due to multiple reasons including the travel distance.     We discussed that it may be possible to plan her radiotherapy at [facility] in [place], [place], contingent upon Dr. Palencia agreeing to this plan. We will discuss the case with him as well.     The patient is currently deciding, and we encouraged her to make her decision quickly. We will continue to remotely follow her case and provided her with our contact information.     - Agree with steroids as per neurosurgery  - Continue supportive cares and cardiology evaluation/management  - We will actively await patient's  decision regarding location of treatment     Thank you for allowing us to participate in this patient's care.  Please feel free to call with any questions or concerns.       The patient was seen and examined with Dr. Corey.    Angel Oliva MD PGY-3  Radiation Oncology, [facility]  [phone] clinic  Pager [phone]    I saw the patient with the resident.  I agree with the resident's note and plan of care.      PATRICK Corey M.D.  Department of Radiation Oncology  [facility]

## 2020-07-02 NOTE — PLAN OF CARE
Major Shift Events: Remains neurologically intact with occasional word finding difficulty. Echo, EEG and PICC line placed. Dobutamine stopped at 1400, lowest HR since then was 48, typically 50s.    Plan: Continue to monitor and assess. Continue to coordinate with patient and her  and neurosurgery/rad onc for POC.

## 2020-07-03 NOTE — TELEPHONE ENCOUNTER
I contacted the patient as she was leaving the hospital regarding radiotherapy plans. She is interested in seeking treatment at [facility] in [place], [place]. I have discussed her case with Dr. Palencia the treating radiation oncologist there and his team will arrange for follow up.    She has an appointment on Monday with Dr. Hung in neuro-oncology to discuss temozolamide.    Angel Oliva MD  PGY-4 Radiation Oncology

## 2020-07-03 NOTE — PROGRESS NOTES
Neurosurgery Progress Note    Overnight events/subjective:   Met with Radiation Oncology to discuss planning for radiation near patient' s home.  HR trending down to 40s overnight while otherwise stable; per Cardiology, no need to give atropine/restart dopamine  until symptomatic or HR 30 or less.  No overt seizures on video EEG.    O/ /76   Pulse 75   Temp 98.8  F (37.1  C) (Oral)   Resp 24   Wt 66 kg (145 lb 8.1 oz)   SpO2 97%   BMI 22.12 kg/m       Exam:   Gen: Laying in bed, not in acute distress  MS: A&Ox3, Speech fluent and conversant   CN: Pupils round and reactive, extraocular movements intact, face symmetric, tongue midline, uvula & palate elevate symmetrically   Motor: 5/5 in b/l UE& LEs  Sensory: intact to light touch   No drift    Non labored breathing on room air    IMG:   MRI brain with and without contrast:  (Radiology read pending.)  Left frontal irregularly enhancing partially cystic mass extending from resection cavity to anterior genu of corpus callosum. Smaller rounded enhancing mass in superior left frontal white matter. Minimally enhancing mass in white matter of left temporal tip.    A/P: Africa Dempsey is a 44 year old woman with history of left frontal glioblastoma s/p resection 10/1/19 without adjuvant chemotherapy or radiation who presented 7/1 with word-finding difficulty, emesis, and an episode of pulseless arrest (ROSC within 1 minute).  Brain MRI at time of admission demonstrated multifocal left-sided tumors with surrounding vasogenic edema.    Appreciate Cardiology recommendations  Appreciate Radiation Oncology recommendations  Goals of care discussion with patient (to date, has opted for holistic measures other than surgical resection)  Dexamethasone 4mg q 6h  Follow up video EEG interpretation  Transfer to Medicine/MICU      -----------------------------------  Chris Trevino M.D.  Neurosurgery Resident, PGY-2    Please contact neurosurgery resident on call with  questions.    Dial * * [phone], enter [phone] when prompted.

## 2020-07-03 NOTE — PLAN OF CARE
Discharged to: Home (via car with  driving) at 1345.   Belongings: Searched room with  and sent all belongings with patient/ .   AVS (After Visit Summary) discussed with: Patient and .

## 2020-07-03 NOTE — PLAN OF CARE
Major Shift Events:  Pt remains neurologically intact. No word finding difficulty tonight, speech clear, logical, and spontaneous. Heart rates have been as low as 39 overnight, Neuro surgery aware, EKG ordered and completed this AM. Patient up to the bedside commode with adequate urine output.    Plan: Continue to monitor. Update team with any acute changes.    For vital signs and complete assessments, please see documentation flowsheets.

## 2020-07-03 NOTE — DISCHARGE SUMMARY
"[facility] Discharge Summary and Instructions    Africa Dempsey MRN# [id number]   Age: 44 year old YOB: 1975     Date of Admission:  7/1/2020  Date of Discharge::  7/3/2020  Admitting Physician:  Eduardo Lucas MD  Discharge Physician:  Eduardo Lucas MD          Admission Diagnoses:   Glioblastoma  Brain tumor (H)          Discharge Diagnosis:     Glioblastoma  Brain tumor (H)          Procedures:   None           Brief History of Illness:   Africa Dempsey is a 44 year old female who has a history of GBM s/p resection 10/1/2019 with Dr. Yo. She had elected to pursue [facility] practices for treatment and did not undergo chemoradiation.  She states she woke up (7/1) with word finding difficulty and feeling \"off.\" Presented to OSH where she, reportedly, had a syncopal event which led to cardiac arrest requiring CPR; achieved ROSC. Transferred to [facility] for further workup.           Hospital Course:   Africa Dempsey was admitted on 7/1. MRI was obtained showing 3 new lesions compared to her March MRI of this year, therefore there was no surgery offered. She was started on dexamethasone and was hooked up to vEEG. No seizures were noted after 24 hours of monitoring. She was however started on Keppra for her suspicious history and location of her lesions. She met with radiation oncology and will be setting up her radiation plan closer to home. She will follow up as an outpatient with Dr. Hung for chemotherapy planning.    Regarding her heart, cardiology was involved throughout her stay. She was started on a dopamine drip per cardiology due to her arrest at OSH. This was discontinued after one night and she was monitored in the ICU. She had episodes of bradycardia into the 40's that were asymptomatic and she never needed intervention. The cardiology EP team deemed her safe for discharge with follow up as outpatient.          Discharge Medications:     Current Discharge Medication " List      START taking these medications    Details   !! dexamethasone (DECADRON) 2 MG tablet 1 tablet (2 mg) by Oral or Feeding Tube route every 8 hours  Qty: 90 tablet, Refills: 0    Associated Diagnoses: Brain tumor (H)      !! dexamethasone (DECADRON) 4 MG tablet 1 tablet (4 mg) by Oral or Feeding Tube route every 8 hours  Qty: 2 tablet, Refills: 0    Associated Diagnoses: Brain tumor (H)      levETIRAcetam (KEPPRA) 500 MG tablet 1 tablet (500 mg) by Oral or Feeding Tube route 2 times daily  Qty: 60 tablet, Refills: 0    Associated Diagnoses: Brain tumor (H)      pantoprazole (PROTONIX) 40 MG EC tablet Take 1 tablet (40 mg) by mouth every morning (before breakfast)  Qty: 30 tablet, Refills: 0    Associated Diagnoses: Brain tumor (H)       !! - Potential duplicate medications found. Please discuss with provider.      CONTINUE these medications which have NOT CHANGED    Details   oxyCODONE (ROXICODONE) 5 MG tablet Take 1-2 tablets (5-10 mg) by mouth every 6 hours as needed for moderate to severe pain  Qty: 40 tablet, Refills: 0    Comments: Indication: Moderate to Severe Post-Operative Pain  Associated Diagnoses: Brain tumor (H)      senna-docusate (SENOKOT-S/PERICOLACE) 8.6-50 MG tablet Take 1 tablet by mouth 2 times daily as needed for constipation  Qty: 60 tablet, Refills: 0    Comments: Indication: Prevention of Constipation with use of Opioids  Associated Diagnoses: Brain tumor (H)            Exam:  Subtle conversational word finding difficulty  Otherwise neurologically intact exam         Discharge Instructions and Follow-Up:     Discharge diet: Regular   Discharge activity: Activity as tolerated   Discharge follow-up: Follow-up with Neuro oncology and radiation oncology         Please call if you have:  1. increased pain, redness, drainage, swelling at your incision  2. fevers > 101.5 F degrees  3. with any questions or concerns.  You may reach the Neurosurgery clinic at [phone] during regular work  hours. ER at [phone].    and ask for the Neurosurgery Resident on call at [phone], during off hours or weekends.         Discharge Disposition:     Discharged to home        Abdulkadir Brito MD  Neurosurgery Resident, PGY-2

## 2020-07-05 NOTE — PROGRESS NOTES
"Africa Dempsey is a 44 year old female who is being evaluated via a billable video visit.      The patient has been notified of following:     \"This video visit will be conducted via a call between you and your physician/provider. We have found that certain health care needs can be provided without the need for an in-person physical exam.  This service lets us provide the care you need with a video conversation.  If a prescription is necessary we can send it directly to your pharmacy.  If lab work is needed we can place an order for that and you can then stop by our lab to have the test done at a later time.    Video visits are billed at different rates depending on your insurance coverage.  Please reach out to your insurance provider with any questions.    If during the course of the call the physician/provider feels a video visit is not appropriate, you will not be charged for this service.\"    Patient has given verbal consent for Video visit? Yes    How would you like to obtain your AVS? Mail a copy    Vitals - Patient Reported  Weight (Patient Reported): 61.7 kg (136 lb)  Height (Patient Reported): 172.7 cm (5' 7.99\")  BMI (Based on Pt Reported Ht/Wt): 20.68    Jamel Mckinney LPN      ____________________________________________________________________    NEURO-ONCOLOGY VISIT  Jul 6, 2020    CHIEF COMPLAINT: Ms. Africa Dempsey is a 44 year old right-handed woman with a left frontal glioblastoma (IDH wildtype, MGMT promoter unmethylated), diagnosed following resection on 10/1/2019. To this point, Africa has deferred standard upfront chemoradiotherapy in favor of alternative/ holistic options. Unfortunately, imaging in 7/2020 showed local as well as distant disease progression.     I met with Africa and Jim () today for this follow-up visit.       HISTORY OF PRESENT ILLNESS  -Africa was recently hospitalized after presenting to a local hospital with word-finding issues and then, had a syncopal " event, which led to cardiac arrest requiring CPR. She was transferred to [facility]. Imaging was consistent with disease progression. Given multi-focal disease, Africa was not thought to be a surgical candidate.   -Monitored on vEEG with no seizures noted after 24 hours. She was started on Keppra. Tolerating this medication well, but more emotional now. Not driving.   -Today, word-finding is better, but there are still issues. Ability to concentrate and think has improved. Still mentally foggy, short term memory issues.  -Generalized weakness. When walking, needs some assistance.   -Mild headaches. Slight dizziness.   -Sleep is effected by steroid dosing.   -On dexamethasone taper; 4mg daily (dosed BID).  -Denies any weakness or changes in sensation.  -Remaining positive. [birth year].   -Wanting to continue supplements; a de-wormer. Avoiding sugar. Juicing a lot.      REVIEW OF SYSTEMS  A comprehensive ROS negative except as in HPI.      MEDICATIONS   Current Outpatient Medications   Medication Sig Dispense Refill     dexamethasone (DECADRON) 2 MG tablet 1 tablet (2 mg) by Oral or Feeding Tube route every 8 hours (Patient taking differently: 2 mg by Oral or Feeding Tube route 2 times daily (with meals) ) 90 tablet 0     levETIRAcetam (KEPPRA) 500 MG tablet 1 tablet (500 mg) by Oral or Feeding Tube route 2 times daily 60 tablet 0     pantoprazole (PROTONIX) 40 MG EC tablet Take 1 tablet (40 mg) by mouth every morning (before breakfast) 30 tablet 0     DRUG ALLERGIES   Allergies   Allergen Reactions     Amoxicillin Hives       ONCOLOGIC HISTORY  -9/2019 PRESENTATION: Progressively worsening headaches over the past 3 weeks   -9/30/2019 MR brain imaging demonstrated a left frontal multi-lobed rim-enhancing mass lesion with associated edema.   -10/1/2019 SURGERY: Craniotomy for mass resection, gross total.   PATHOLOGY: Glioblastoma; IDH1-R132H wildtype/ IDH1, IHD2 wildtype by next generation sequencing. TP53  mutated.  BRAF, PTEN not mutated. MGMT promoter unmethylated.  -10/28/2019 NEURO-ONC: Recommending chemoradiotherapy, however, Africa is opting for alternative/ holistic options.   -7/1/2020 MRB with enhancement around the resection cavity, increased in size with extension into the corpus callosum. There are at least 3 new lesions in the left cerebral hemisphere, distant from the resection cavity.   -7/6/2020 NEURO-ONC: Clinically better on dexamethasone and Keppra. Wanting to initiate standard upfront treatment.     SOCIAL HISTORY   Tobacco use: Never smoker.   Alcohol use: Social.   , 2 children.   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      PHYSICAL EXAMINATION  -Generally well appearing.  -Respiratory: No audible wheezing.   -Skin: No facial rashes. Healed head incision.  -Psychiatric: Normal mood and affect. Pleasant, talkative.  -Neurologic:   MENTAL STATUS:     Alert, oriented to date.    Recall: Intact.    Speech fluent.    Comprehension intact to multi-step commands.     CRANIAL NERVES:     Pupils are equal, round, reactive to light.     Extraocular movements full, patient denies diplopia.     Symmetric facial movements.   Hearing intact.   With normal phonation, no dysfunction of the palate or tongue.   MOTOR: Antigravity in arms. No pronation or drift.   SENSATION: Intact to light touch throughout.   COORDINATION: Intact bilaterally to finger-nose with eyes closed.   GAIT: Stable, good turns.     The rest of a comprehensive physical examination is deferred due to PHE (public health emergency) video visit restrictions.        MEDICAL RECORDS  Obtained and personally reviewed all available outside medical records in addition to reviewing any records available in our electronic system.     LABS  Personally reviewed all available lab results.     IMAGING  Personally reviewed recent MR brain imaging from last week and compared to prior. To my eye, there evidence of local and  disease progression throughout the left cerebral hemisphere.     Imaging was shown to and results were reviewed with Africa and Jim.       IMPRESSION  Clinic was spent discussing in detail the nature of this tumor in light of her repeat imaging and recent hospitalization. This was in addition to providing emotional support, answering questions pertaining to my recommendations, and devising the treatment plan as outlined below.     With regard to cancer-directed therapy, Africa has chosen to pursue holistic/ alternative treatment measures instead of the recommended standard upfront treatment of chemoradiotherapy since diagnosis about 1 year ago. Imaging from last week demonstrated local as well as distant disease progression. In light of these findings, she is not a candidate for additional surgery. However, she could benefit from chemoradiotherapy and the risk/ benefits of this treatment were discussed today.     Following this discussion, Africa is now interested in me prescribing temozolomide and in seeing Dr. Palencia, radiation oncologist at [facility]. The use of Optune therapy to be discussed at next appointment.     I stressed the importance of holding all supplements at least while on upfront therapy.     Finally, due to her loss of consciousness, [place] State Law prohibits driving for 3 months and I informed Africa of this.     PROBLEM LIST  Glioblastoma, MGMT unmethylated and IDH1 wildtype by NGS  Headaches    PLAN  -CANCER-DIRECTED THERAPY-  -As above; Radiation oncology appt to be arranged with Dr. Palencia.   -Prescribing concurrent temozolomide; 140mg daily x 3-6 weeks of treatment.   -Pharmacy to call with chemotherapy teaching.   -Supportive medications to be prescribed; Zofran (anti-nausea), Bactrim, and OTC bowel regimen.  -Baseline and monitoring labs to be done prior to start of chemoradiotherapy and weekly during treatment.   -Holding supplements at this time.     -STEROIDS-  -Continue dexamethasone  taper. Recommended taking complete daily dose of steroids in the morning.   -Dose may need to be increased during radiation therapy due to treatment-induced increased intracranial swelling.    -SEIZURE MANAGEMENT-  -History consistent with a generalized seizure event.   -No driving for 3 months (10/1/2020).  -Continue Keppra.     -Quality of life/ MOOD/ FATIGUE-  -Denies any mood issues.  -Continue to monitor mood as untreated/ undertreated depression can worsen fatigue, dysorexia, and quality of life.    -BRADYCARDIA-  -HR in the 40's.   -Continue to follow with cardiology.     Return to clinic in 4 weeks at mid-point in treatment to discuss the addition of Optune.     In the meantime, Africa knows to call with questions or concerns or to report new complaints and can be seen sooner if needed.    Shannen Hung MD  Neuro-oncology      Video-Visit Details  Type of service:  Video Visit    Video Start Time: 10:08 AM  Video End Time: 10:53 AM    Originating Location (pt. Location): Home    Distant Location (provider location):  [facility] CANCER [facility]     Platform used for Video Visit: DCITS ([place] site cannot be reached by patient)    Shannen Hung MD

## 2020-07-06 NOTE — PATIENT INSTRUCTIONS
Appt with Dr. Palencia with radiation oncology.     Ordering temozolomide.   Pharmacy to call with teaching and additional information.  Prescribing Zofran and Bactrim.    Continue Keppra.   No driving until October.     Holding supplements at this time.     Baseline and monitoring labs to be done at [place].     Return to clinic with me in 4 weeks for follow-up.   We will discuss Optune therapy at this time.     Shannen Hung MD  Neuro-oncology  7/6/2020

## 2020-07-06 NOTE — PROGRESS NOTES
Date: 7/6/2020 Status: [facility]   Time: 10:00 AM Length: 30   Visit Type: VIDEO VISIT RETURN [2700] NASH: [id number]   Provider: Shannen Hung MD Department:  ONCOLOGY ADULT       Patient has clinic visit within 24-48 hours of Discharge so no post DC follow up call is needed

## 2020-07-06 NOTE — LETTER
7/6/2020         RE: Africa Dempsey  [address]  [facility] [address]        Dear Colleague,    Thank you for referring your patient, Africa Dempsey, to the [facility]. Please see a copy of my visit note below.    ____________________________________________________________________    NEURO-ONCOLOGY VISIT  Jul 6, 2020    CHIEF COMPLAINT: Ms. Africa Dempsey is a 44 year old right-handed woman with a left frontal glioblastoma (IDH wildtype, MGMT promoter unmethylated), diagnosed following resection on 10/1/2019. To this point, Africa has deferred standard upfront chemoradiotherapy in favor of alternative/ holistic options. Unfortunately, imaging in 7/2020 showed local as well as distant disease progression.     I met with Africa and Jim () today for this follow-up visit.       HISTORY OF PRESENT ILLNESS  -Africa was recently hospitalized after presenting to a local hospital with word-finding issues and then, had a syncopal event, which led to cardiac arrest requiring CPR. She was transferred to [facility]. Imaging was consistent with disease progression. Given multi-focal disease, Africa was not thought to be a surgical candidate.   -Monitored on vEEG with no seizures noted after 24 hours. She was started on Keppra. Tolerating this medication well, but more emotional now. Not driving.   -Today, word-finding is better, but there are still issues. Ability to concentrate and think has improved. Still mentally foggy, short term memory issues.  -Generalized weakness. When walking, needs some assistance.   -Mild headaches. Slight dizziness.   -Sleep is effected by steroid dosing.   -On dexamethasone taper; 4mg daily (dosed BID).  -Denies any weakness or changes in sensation.  -Remaining positive. [birth year].   -Wanting to continue supplements; a de-wormer. Avoiding sugar. Juicing a lot.      REVIEW OF SYSTEMS  A comprehensive ROS negative except as in  HPI.      MEDICATIONS   Current Outpatient Medications   Medication Sig Dispense Refill     dexamethasone (DECADRON) 2 MG tablet 1 tablet (2 mg) by Oral or Feeding Tube route every 8 hours (Patient taking differently: 2 mg by Oral or Feeding Tube route 2 times daily (with meals) ) 90 tablet 0     levETIRAcetam (KEPPRA) 500 MG tablet 1 tablet (500 mg) by Oral or Feeding Tube route 2 times daily 60 tablet 0     pantoprazole (PROTONIX) 40 MG EC tablet Take 1 tablet (40 mg) by mouth every morning (before breakfast) 30 tablet 0     DRUG ALLERGIES   Allergies   Allergen Reactions     Amoxicillin Hives       ONCOLOGIC HISTORY  -9/2019 PRESENTATION: Progressively worsening headaches over the past 3 weeks   -9/30/2019 MR brain imaging demonstrated a left frontal multi-lobed rim-enhancing mass lesion with associated edema.   -10/1/2019 SURGERY: Craniotomy for mass resection, gross total.   PATHOLOGY: Glioblastoma; IDH1-R132H wildtype/ IDH1, IHD2 wildtype by next generation sequencing. TP53 mutated.  BRAF, PTEN not mutated. MGMT promoter unmethylated.  -10/28/2019 NEURO-ONC: Recommending chemoradiotherapy, however, Africa is opting for alternative/ holistic options.   -7/1/2020 MRB with enhancement around the resection cavity, increased in size with extension into the corpus callosum. There are at least 3 new lesions in the left cerebral hemisphere, distant from the resection cavity.   -7/6/2020 NEURO-ONC: Clinically better on dexamethasone and Keppra. Wanting to initiate standard upfront treatment.     SOCIAL HISTORY   Tobacco use: Never smoker.   Alcohol use: Social.   , 2 children.   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      PHYSICAL EXAMINATION  -Generally well appearing.  -Respiratory: No audible wheezing.   -Skin: No facial rashes. Healed head incision.  -Psychiatric: Normal mood and affect. Pleasant, talkative.  -Neurologic:   MENTAL STATUS:     Alert, oriented to date.    Recall: Intact.     Speech fluent.    Comprehension intact to multi-step commands.     CRANIAL NERVES:     Pupils are equal, round, reactive to light.     Extraocular movements full, patient denies diplopia.     Symmetric facial movements.   Hearing intact.   With normal phonation, no dysfunction of the palate or tongue.   MOTOR: Antigravity in arms. No pronation or drift.   SENSATION: Intact to light touch throughout.   COORDINATION: Intact bilaterally to finger-nose with eyes closed.   GAIT: Stable, good turns.     The rest of a comprehensive physical examination is deferred due to PHE (public health emergency) video visit restrictions.        MEDICAL RECORDS  Obtained and personally reviewed all available outside medical records in addition to reviewing any records available in our electronic system.     LABS  Personally reviewed all available lab results.     IMAGING  Personally reviewed recent MR brain imaging from last week and compared to prior. To my eye, there evidence of local and disease progression throughout the left cerebral hemisphere.     Imaging was shown to and results were reviewed with Africa and Jim.       IMPRESSION  Clinic was spent discussing in detail the nature of this tumor in light of her repeat imaging and recent hospitalization. This was in addition to providing emotional support, answering questions pertaining to my recommendations, and devising the treatment plan as outlined below.     With regard to cancer-directed therapy, Africa has chosen to pursue holistic/ alternative treatment measures instead of the recommended standard upfront treatment of chemoradiotherapy since diagnosis about 1 year ago. Imaging from last week demonstrated local as well as distant disease progression. In light of these findings, she is not a candidate for additional surgery. However, she could benefit from chemoradiotherapy and the risk/ benefits of this treatment were discussed today.     Following this discussion,  Africa is now interested in me prescribing temozolomide and in seeing Dr. Palencia, radiation oncologist at [facility]. The use of Optune therapy to be discussed at next appointment.     I stressed the importance of holding all supplements at least while on upfront therapy.     Finally, due to her loss of consciousness, [place] State Law prohibits driving for 3 months and I informed Africa of this.     PROBLEM LIST  Glioblastoma, MGMT unmethylated and IDH1 wildtype by NGS  Headaches    PLAN  -CANCER-DIRECTED THERAPY-  -As above; Radiation oncology appt to be arranged with Dr. Palencia.   -Prescribing concurrent temozolomide; 140mg daily x 3-6 weeks of treatment.   -Pharmacy to call with chemotherapy teaching.   -Supportive medications to be prescribed; Zofran (anti-nausea), Bactrim, and OTC bowel regimen.  -Baseline and monitoring labs to be done prior to start of chemoradiotherapy and weekly during treatment.   -Holding supplements at this time.     -STEROIDS-  -Continue dexamethasone taper. Recommended taking complete daily dose of steroids in the morning.   -Dose may need to be increased during radiation therapy due to treatment-induced increased intracranial swelling.    -SEIZURE MANAGEMENT-  -History consistent with a generalized seizure event.   -No driving for 3 months (10/1/2020).  -Continue Keppra.     -Quality of life/ MOOD/ FATIGUE-  -Denies any mood issues.  -Continue to monitor mood as untreated/ undertreated depression can worsen fatigue, dysorexia, and quality of life.    -BRADYCARDIA-  -HR in the 40's.   -Continue to follow with cardiology.     Return to clinic in 4 weeks at mid-point in treatment to discuss the addition of Optune.     In the meantime, Africa knows to call with questions or concerns or to report new complaints and can be seen sooner if needed.    Shannen Hung MD  Neuro-oncology      Video-Visit Details  Type of service:  Video Visit    Video Start Time: 10:08 AM  Video End Time: 10:53  AM    Originating Location (pt. Location): Home    Distant Location (provider location):  [facility]     Platform used for Video Visit: [employer] ([place] site cannot be reached by patient)    Shannen Hung MD      Again, thank you for allowing me to participate in the care of your patient.        Sincerely,        Shannen Hung MD

## 2020-07-07 NOTE — TELEPHONE ENCOUNTER
Writer reached out to the patient to discuss the copay on new Temozolomide orders. Copay is about $2900 and unfortunately there are no grants open and there is no longer any free medication programs. I left a message for the patient to discuss. I will also send a message to the team to just let them know where this is at while I wait to hear from the patient if the copay is manageable.     Luz Tsai CPhT  [facility]  Oncology Pharmacy Liaison  [email or web address]  Phone: [phone]  Fax: [phone]

## 2020-07-07 NOTE — PROGRESS NOTES
Department of Radiation Oncology  Radiation Therapy Center  [facility]  [address], [address]  [place] [address]  [phone]       Consultation Note    Name: Africa Dempsey MRN: [id number]   : 1975   Date of Service: 2020  Referring: Dr. Hung     Reason for consultation: Recurrent Glioblastoma multiforme (IDH-1 wild type, MGMT promotor site not hyper methylated)  status post resection without initial adjuvant therapy. Evaluate potential role of radiation therapy.     History of Present Illness   Ms. Dempsey is a 45 year old female recurrent Glioblastoma multiforme (IDH-1 wild type, MGMT promotor site not hyper methylated)  status post resection without initial adjuvant therapy. She presents today to discuss the potential role of radiation therapy as a part of her treatment strategy.     The patient's oncologic history dates to 2019 when she presented with progressively worsening headaches.  Imaging at that time including MRI of the brain demonstrated a left frontal enhancing mass concerning for primary brain tumor.  On 10/1/2019 the patient underwent gross total resection by Dr. Yo of the lesion.  Final pathology demonstrated glioblastoma multiforme, IDH 1 wild-type, MGMT promoter site unmethylated.  Adjuvant chemoradiation therapy following surgery was discussed with the patient, however patient declined.  She was followed with subsequent surveillance imaging and was without evidence of progressive disease until scans on 2020.  On 2020 the patient noticed headache and progressive word finding difficulty.  Imaging including brain MRI on 2020 demonstrated concern for multifocal areas of enhancement including the prior resection cavity in the left frontal lobe as well as other potential sites of disease including an adjacent left frontal lobe lesion, left temporal lobe lesion, and T2 FLAIR abnormality involving the left hippocampus region.   Reported the patient underwent a syncopal episode and cardiac arrest leading to CPR at the outside hospital.  She was eventually transferred to [facility] for further management.  She is evaluated by cardiology who felt the edema could have possibly contributed to arrhythmia.  She was initiated on dopamine drip, eventually tapered off.  ECHO was negative. The patient's case was discussed and neuro-oncology tumor board recommendation was to proceed with definitive management chemoradiation therapy.  Patient met with Dr. Hung medical oncology who discussed concurrent and adjuvant Temodar.  Patient presents our clinic to discuss next steps in management regarding radiation therapy.    Patient is overall fairly doing well.  She denies any further syncopal episode.  No seizure.  Does have intermittent headache.  Currently on 8mg  of Decadron. Improve weakness and aphasia.      Past Medical History:   No past medical history on file.    Past Surgical History:   Past Surgical History:   Procedure Laterality Date     OPTICAL TRACKING SYSTEM CRANIOTOMY Left 10/1/2019    Procedure: Stealth Assisted Left Frontal Cranitomy for Tumor Resection;  Surgeon: Sandip Yo MD;  Location: [facility] OR       Chemotherapy History:  Per HPI    Radiation History:  None    Pregnant: No  Implanted Cardiac Devices: No    Medications:  Current Outpatient Medications   Medication     dexamethasone (DECADRON) 2 MG tablet     levETIRAcetam (KEPPRA) 500 MG tablet     ondansetron (ZOFRAN) 4 MG tablet     pantoprazole (PROTONIX) 40 MG EC tablet     sulfamethoxazole-trimethoprim (BACTRIM DS) 800-160 MG tablet     No current facility-administered medications for this visit.          Allergies:     Allergies   Allergen Reactions     Amoxicillin Hives           Family History:  No family history on file.    Review of Systems   A 10-point review of systems was performed. Pertinent findings are noted in the HPI.    Physical Exam   ECOG Status:  1    Vitals:  There were no vitals taken for this visit.    Gen: Alert, in NAD  Head: NC/AT  Eyes: PERRL, EOMI, sclera anicteric  Ears: No external auricular lesions  Nose/sinus: No rhinorrhea or epistaxis  Oral cavity/oropharynx: MMM, no visible oral cavity lesions, FOM and BOT are soft to palpation  Neck: Full ROM, supple, no palpable adenopathy  Pulm: No wheezing, stridor or respiratory distress  CV: Extremities are warm and well-perfused, no cyanosis, no pedal edema  Abdominal: Normal bowel sounds, soft, nontender, no masses  Musculoskeletal: Normal bulk and tone  Skin: Normal color and turgor  Neuro: A/Ox3, CN II-XII intact, normal gait    Imaging/Path/Labs   Imagin/1/20  MRI brain    Findings:  Postsurgical changes from left frontal mass resection. There is  hemosiderin deposition along the margins of the resection cavity an  dural thickening underlying the craniotomy. Compared to prior MRI, the  size of the resection cavity decreased. Now there is enhancing soft  tissue mass about the posterior margin of the resection cavity with  extension into genu of corpus callosum measuring 4.3 x 2.2 cm.     Additionally there is a new 1.6 x 1.5 x 1.2 cm peripherally enhancing  left frontal lobe lesion slightly more superiorly on series 14 image  104 which was not previously evident. Additionally, there is a 2.6 x  1.7 x 2.5 cm peripherally enhancing inferior left temporal lobe lesion  on series 14 image 48 which is also new. There is significantly  increased perilesional edema about the left frontal lobe lesions and  to a lesser degree the left temporal lobe lesion. There is a nodular  nonenhancing T2 hyperintense lesion within the body of the hippocampus  measuring 1.5 x 2.4 cm (series 3, image 12). The edema courses to the  midline into the genu of the corpus callosum. There is associated  signal dropout on susceptibility weighted imaging associated with all  3 of these enhancing lesions, most apparent in the  "new left temporal  lesion.     No evidence of acute infarction on susceptibility weighted imaging.  There is mild mass effect on the anterior horn of the left lateral  ventricle, although no evidence for hydrocephalus. There is a small  amount of mass effect on the anterior falx. No midline shift at the  level of the ventricles.     Normal major vascular intracranial flow voids.     The visualized portions of paranasal sinuses, and mastoid air cells  are relatively clear. The orbits are grossly unremarkable.                                                                      Impression:  Findings concerning for disease progression in this patient with  history of resected GBM. Enhancement around resection cavity increased  in size now with extension into the corpus callosum. Additionally,  there are at least 3 new lesions in the left cerebral hemisphere far  from the resection cavity.           Path:   10/1/19    FINAL INTEGRATED DIAGNOSIS:   Brain, designated \"left tumor - brain\", resection:   - GLIOBLASTOMA (WHO GRADE IV), IDH-WILDTYPE.   - A CLINICALLY SIGNIFICANT TP53 MUTATION WAS DETECTED (see [id number] for   additional details).   - NO DETECTED ALTERATIONS OF THE AMINO ACID SEQUENCE OF THE FOLLOWING   GENES: BRAF, IDH1, IDH2, PTEN (see   [id number] for additional details).   - NO EVIDENCE OF MGMT- PROMOTER METHYLATION, (see [id number] for additional   details).     ORIGINAL REPORT:     SPECIMEN(S):   A: Left brain tumor   B: Left brain tumor     FINAL DIAGNOSIS:   A. Brain, designated \" left tumor - brain \", biopsy:    - GLIOBLASTOMA, WHO GRADE IV, (SEE COMMENT).     B. Brain, designated \" left tumor - brain \", resection:    - GLIOBLASTOMA, WHO GRADE IV, (SEE COMMENT).     Assessment    Recurrent Glioblastoma multiforme (IDH-1 wild type, MGMT promotor site not hyper methylated)  status post resection without initial adjuvant therapy. She presents today to discuss the potential role of radiation therapy as a part " of her treatment strategy.     Plan     1. I discussed the natural history of multifocal GBM with the patient.      2. I discussed that I am in agreement with tumor board and Dr. Hung's recommendation of proceeding with definitive chemo-radiation therapy at this time.      3. Different radiation fractionation schemes have been used in the literature. Given presence of multifocal disease  which tends to portend worse overall prognosis,  I recommend proceeding with a hypofractionated course of RT (40 Gy in 15 fractions. Given the large burden of disease, I do not feel the potential benefits of treatment with extended course (60 Gy in 30 fractions) outweigh its risk.       4. We have discussed side effects of radiation in detail with this patient, including risk of fatigue, headaches, patchy hair loss, nausea, hearing loss, visual changes, and mental slowing.      5. CT simulation performed today. Consent obtained today.Will plan to start RT in about 1 week and coordinate plan with our medical oncology colleagues.     Dhruv Palencia MD  Department of Radiation Oncology  [facility]

## 2020-07-08 NOTE — PROGRESS NOTES
RN Care Coordination Note  Incoming Call:   Patient is requesting if Dr. Hung could recommend a Radiation Oncologist in [place] so she could get radiation treatments closer to home.  Provider Response:  Dr. Hung and Dr. Wolf recommend Dr. Marco Grewal at [facility] in [place]. [phone]  Outgoing Call:   Left message for patient with above information. Requested a call back to notify Dr. Hung once scheduled and to let us know if any information needs to be faxed to [facility].    Sara Melgar RN, BSN  Care Coordinator   [facility]

## 2020-07-09 PROBLEM — C71.9 GLIOBLASTOMA (H): Status: ACTIVE | Noted: 2019-10-28

## 2020-07-09 NOTE — PROGRESS NOTES
Patient underwent CT simulation.     Dhruv Palencia M.D.  Department of Radiation Oncology  [facility]

## 2020-07-09 NOTE — LETTER
2020         RE: Africa Dempsey  [address]  [facility] [address]        Dear Colleague,    Thank you for referring your patient, Africa Dempsey, to the [facility]. Please see a copy of my visit note below.            Consultation Note    Name: Africa Dempsey MRN: [id number]   : 1975   Date of Service: 2020  Referring: Dr. Hung     Reason for consultation: Recurrent Glioblastoma multiforme (IDH-1 wild type, MGMT promotor site not hyper methylated)  status post resection without initial adjuvant therapy. Evaluate potential role of radiation therapy.     History of Present Illness   Ms. Dempsey is a 45 year old female recurrent Glioblastoma multiforme (IDH-1 wild type, MGMT promotor site not hyper methylated)  status post resection without initial adjuvant therapy. She presents today to discuss the potential role of radiation therapy as a part of her treatment strategy.     The patient's oncologic history dates to 2019 when she presented with progressively worsening headaches.  Imaging at that time including MRI of the brain demonstrated a left frontal enhancing mass concerning for primary brain tumor.  On 10/1/2019 the patient underwent gross total resection by Dr. Yo of the lesion.  Final pathology demonstrated glioblastoma multiforme, IDH 1 wild-type, MGMT promoter site unmethylated.  Adjuvant chemoradiation therapy following surgery was discussed with the patient, however patient declined.  She was followed with subsequent surveillance imaging and was without evidence of progressive disease until scans on 2020.  On 2020 the patient noticed headache and progressive word finding difficulty.  Imaging including brain MRI on 2020 demonstrated concern for multifocal areas of enhancement including the prior resection cavity in the left frontal lobe as well as other potential sites of disease including an adjacent left frontal lobe lesion, left  temporal lobe lesion, and T2 FLAIR abnormality involving the left hippocampus region.  Reported the patient underwent a syncopal episode and cardiac arrest leading to CPR at the outside hospital.  She was eventually transferred to [facility] for further management.  She is evaluated by cardiology who felt the edema could have possibly contributed to arrhythmia.  She was initiated on dopamine drip, eventually tapered off.  ECHO was negative. The patient's case was discussed and neuro-oncology tumor board recommendation was to proceed with definitive management chemoradiation therapy.  Patient met with Dr. Hung medical oncology who discussed concurrent and adjuvant Temodar.  Patient presents our clinic to discuss next steps in management regarding radiation therapy.    Patient is overall fairly doing well.  She denies any further syncopal episode.  No seizure.  Does have intermittent headache.  Currently on 8mg  of Decadron. Improve weakness and aphasia.      Past Medical History:   No past medical history on file.    Past Surgical History:   Past Surgical History:   Procedure Laterality Date     OPTICAL TRACKING SYSTEM CRANIOTOMY Left 10/1/2019    Procedure: Stealth Assisted Left Frontal Cranitomy for Tumor Resection;  Surgeon: Sandip Yo MD;  Location: [facility] OR       Chemotherapy History:  Per HPI    Radiation History:  None    Pregnant: No  Implanted Cardiac Devices: No    Medications:  Current Outpatient Medications   Medication     dexamethasone (DECADRON) 2 MG tablet     levETIRAcetam (KEPPRA) 500 MG tablet     ondansetron (ZOFRAN) 4 MG tablet     pantoprazole (PROTONIX) 40 MG EC tablet     sulfamethoxazole-trimethoprim (BACTRIM DS) 800-160 MG tablet     No current facility-administered medications for this visit.          Allergies:     Allergies   Allergen Reactions     Amoxicillin Hives           Family History:  No family history on file.    Review of Systems   A 10-point review of systems was performed.  Pertinent findings are noted in the HPI.    Physical Exam   ECOG Status: 1    Vitals:  There were no vitals taken for this visit.    Gen: Alert, in NAD  Head: NC/AT  Eyes: PERRL, EOMI, sclera anicteric  Ears: No external auricular lesions  Nose/sinus: No rhinorrhea or epistaxis  Oral cavity/oropharynx: MMM, no visible oral cavity lesions, FOM and BOT are soft to palpation  Neck: Full ROM, supple, no palpable adenopathy  Pulm: No wheezing, stridor or respiratory distress  CV: Extremities are warm and well-perfused, no cyanosis, no pedal edema  Abdominal: Normal bowel sounds, soft, nontender, no masses  Musculoskeletal: Normal bulk and tone  Skin: Normal color and turgor  Neuro: A/Ox3, CN II-XII intact, normal gait    Imaging/Path/Labs   Imagin/1/20  MRI brain    Findings:  Postsurgical changes from left frontal mass resection. There is  hemosiderin deposition along the margins of the resection cavity an  dural thickening underlying the craniotomy. Compared to prior MRI, the  size of the resection cavity decreased. Now there is enhancing soft  tissue mass about the posterior margin of the resection cavity with  extension into genu of corpus callosum measuring 4.3 x 2.2 cm.     Additionally there is a new 1.6 x 1.5 x 1.2 cm peripherally enhancing  left frontal lobe lesion slightly more superiorly on series 14 image  104 which was not previously evident. Additionally, there is a 2.6 x  1.7 x 2.5 cm peripherally enhancing inferior left temporal lobe lesion  on series 14 image 48 which is also new. There is significantly  increased perilesional edema about the left frontal lobe lesions and  to a lesser degree the left temporal lobe lesion. There is a nodular  nonenhancing T2 hyperintense lesion within the body of the hippocampus  measuring 1.5 x 2.4 cm (series 3, image 12). The edema courses to the  midline into the genu of the corpus callosum. There is associated  signal dropout on susceptibility weighted imaging  "associated with all  3 of these enhancing lesions, most apparent in the new left temporal  lesion.     No evidence of acute infarction on susceptibility weighted imaging.  There is mild mass effect on the anterior horn of the left lateral  ventricle, although no evidence for hydrocephalus. There is a small  amount of mass effect on the anterior falx. No midline shift at the  level of the ventricles.     Normal major vascular intracranial flow voids.     The visualized portions of paranasal sinuses, and mastoid air cells  are relatively clear. The orbits are grossly unremarkable.                                                                      Impression:  Findings concerning for disease progression in this patient with  history of resected GBM. Enhancement around resection cavity increased  in size now with extension into the corpus callosum. Additionally,  there are at least 3 new lesions in the left cerebral hemisphere far  from the resection cavity.           Path:   10/1/19    FINAL INTEGRATED DIAGNOSIS:   Brain, designated \"left tumor - brain\", resection:   - GLIOBLASTOMA (WHO GRADE IV), IDH-WILDTYPE.   - A CLINICALLY SIGNIFICANT TP53 MUTATION WAS DETECTED (see [id number] for   additional details).   - NO DETECTED ALTERATIONS OF THE AMINO ACID SEQUENCE OF THE FOLLOWING   GENES: BRAF, IDH1, IDH2, PTEN (see   [id number] for additional details).   - NO EVIDENCE OF MGMT- PROMOTER METHYLATION, (see [id number] for additional   details).     ORIGINAL REPORT:     SPECIMEN(S):   A: Left brain tumor   B: Left brain tumor     FINAL DIAGNOSIS:   A. Brain, designated \" left tumor - brain \", biopsy:    - GLIOBLASTOMA, WHO GRADE IV, (SEE COMMENT).     B. Brain, designated \" left tumor - brain \", resection:    - GLIOBLASTOMA, WHO GRADE IV, (SEE COMMENT).     Assessment    Recurrent Glioblastoma multiforme (IDH-1 wild type, MGMT promotor site not hyper methylated)  status post resection without initial adjuvant therapy. She " presents today to discuss the potential role of radiation therapy as a part of her treatment strategy.     Plan     1. I discussed the natural history of multifocal GBM with the patient.      2. I discussed that I am in agreement with tumor board and Dr. Hung's recommendation of proceeding with definitive chemo-radiation therapy at this time.      3. Different radiation fractionation schemes have been used in the literature. Given presence of multifocal disease  which tends to portend worse overall prognosis,  I recommend proceeding with a hypofractionated course of RT (40 Gy in 15 fractions. Given the large burden of disease, I do not feel the potential benefits of treatment with extended course (60 Gy in 30 fractions) outweigh its risk.       4. We have discussed side effects of radiation in detail with this patient, including risk of fatigue, headaches, patchy hair loss, nausea, hearing loss, visual changes, and mental slowing.      5. CT simulation performed today. Consent obtained today.Will plan to start RT in about 1 week and coordinate plan with our medical oncology colleagues.     Dhruv Palencia MD  Department of Radiation Oncology  [facility]

## 2020-07-09 NOTE — NURSING NOTE
REASON FOR APPOINTMENT   Recurrent gbm, s/p resection in 2019  No previous tx, here to discuss RT. See epic    PERSONAL HISTORY OF CANCER   Previous Cancer ? no   Prior Radiation ? no   Prior Chemotherapy ? no   Prior Hormonal Therapy ? no     REFERRALS NEEDED  ,     VITALS  BP 98/61 (Cuff Size: Adult Regular)   Pulse 64   Resp 16   Wt 63.3 kg (139 lb 9.6 oz)   SpO2 100%   BMI 21.23 kg/m      PACEMAKER/IMPLANTED CARDIAC DEVICE : No    PAIN  Denies    PSYCHOSOCIAL  Marital Status:   Patient lives in [place] with  Karo.  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 Yes    REVIEW OF SYSTEMS  Skin: negative  Eyes: negative  Ears/Nose/Throat: negative  Respiratory: No shortness of breath, dyspnea on exertion, cough, or hemoptysis  Cardiovascular: negative  Gastrointestinal: negative  Genitourinary: negative  Musculoskeletal: negative  Neurologic: memory issues, word finding difficulty, dizziness, headaches, left sided weakness, all improved with decadron  Psychiatric: excessive stress and feeling anxious  Hematologic/Lymphatic/Immunologic: negative  Endocrine: negative    WOMEN ONLY  Any chance you may be pregnant: No    Radiation Oncology Patient Teaching    Person involved with teaching: Patient and   Patient asked Questions: Yes  Patient was cooperative: Yes  Patient was receptive (willing to accept information given): Yes    Education Assessment  Comprehension ability: High  Knowledge level: High  Factors affecting teaching: None    Education Materials Given  Caring for Your Skin During Radiation ...  Eating Hints  Diet and Nutrition Information    Educational Topics Discussed  Side effects, Medications, Nutrition, Adjustment to illness and Community resources    Response To Teaching  Verbalizes understanding    GYN Only  Vaginal Dilator-given and educated: N/A    Do you have an  advanced directive or living will? no  Are you DNR/DNI? no

## 2020-07-09 NOTE — LETTER
7/9/2020         RE: Africa Dempsey  [address]  [facility] [address]        Dear Colleague,    Thank you for referring your patient, Africa Dempsey, to the [facility]. Please see a copy of my visit note below.    Patient underwent CT simulation.     Dhruv Palencia M.D.  Department of Radiation Oncology  [facility]       Again, thank you for allowing me to participate in the care of your patient.        Sincerely,        Dhruv Palencia MD

## 2020-07-10 NOTE — TELEPHONE ENCOUNTER
"Oral Chemotherapy Monitoring Program    Primary Oncologist: Dr. Hung  Primary Oncology Clinic: [facility]   Cancer Diagnosis: GBM    Drug: Temodar 140mg by mouth daily during XRT therapy  Start Date: 7/15/2020   Dose is appropriate for patients: 1.74 BSA, Renal and Hepatic Function   Expected duration of therapy: Until disease progression or unacceptable toxicity    Drug Interaction Assessment: no clinically significant drug interactions identified.   Medication list checked (7/10): -Temodar -DexAMETHasone -LevETIRAcetam -Ondansetron -Pantoprazole -Bactrim DS    Lab Monitoring Plan  CBC weekly and CMP monthly to be arranged at XRT    Subjective/Objective:  Africa Dempsey is a 45 year old female contacted by phone for an initial visit for oral chemotherapy education. I spoke to Africa and her .     ORAL CHEMOTHERAPY 7/10/2020   Drug Name Temodar (Temozolomide)   Current Dosage 140mg   Current Schedule Daily   Cycle Details (No Data)   Start Date of Last Cycle 7/15/2020   Any new drug interactions? No   Is the dose as ordered appropriate for the patient? Yes       Last PHQ-2 Score on record: No flowsheet data found.    Vitals:  BP:   BP Readings from Last 1 Encounters:   07/09/20 98/61     Wt Readings from Last 1 Encounters:   07/09/20 63.3 kg (139 lb 9.6 oz)     Estimated body surface area is 1.74 meters squared as calculated from the following:    Height as of 10/28/19: 1.727 m (5' 8\").    Weight as of 7/9/20: 63.3 kg (139 lb 9.6 oz).      Labs:  _  Result Component Current Result Ref Range   Sodium 137 (7/3/2020) 133 - 144 mmol/L     _  Result Component Current Result Ref Range   Potassium 4.3 (7/3/2020) 3.4 - 5.3 mmol/L     _  Result Component Current Result Ref Range   Calcium 9.6 (7/3/2020) 8.5 - 10.1 mg/dL     _  Result Component Current Result Ref Range   Magnesium 2.2 (7/3/2020) 1.6 - 2.3 mg/dL     _  Result Component Current Result Ref Range   Phosphorus 2.5 (7/3/2020) 2.5 - 4.5 mg/dL "     _  Result Component Current Result Ref Range   Albumin 3.3 (L) (7/3/2020) 3.4 - 5.0 g/dL     _  Result Component Current Result Ref Range   Urea Nitrogen 19 (7/3/2020) 7 - 30 mg/dL     _  Result Component Current Result Ref Range   Creatinine 0.59 (7/3/2020) 0.52 - 1.04 mg/dL     _  Result Component Current Result Ref Range   AST 32 (7/1/2020) 14 - 36 U/L     _  Result Component Current Result Ref Range   ALT 22 (7/1/2020) <35 U/L     No results found for BILITOTAL within last 30 days.     _  Result Component Current Result Ref Range   WBC 9.7 (7/3/2020) 4.0 - 11.0 10e9/L     _  Result Component Current Result Ref Range   Hemoglobin 11.6 (L) (7/3/2020) 11.7 - 15.7 g/dL     _  Result Component Current Result Ref Range   Platelet Count 250 (7/3/2020) 150 - 450 10e9/L     No results found for ANC within last 30 days.     Assessment:  Patient is appropriate to start therapy.    Plan:  Basic chemotherapy teaching was reviewed with the patient and the patient's family including indication, start date of therapy, dose, administration, adverse effects, missed doses, food and drug interactions, monitoring, side effect management, office contact information, and safe handling. Written materials were provided and all questions answered.    Follow-Up:  1 week following the start of Temodar therapy       Ana Cabrales PharmD  Oral Chemotherapy Monitoring Program  [facility]  [phone]

## 2020-07-13 NOTE — PROGRESS NOTES
Lab orders printed and faxed to Bartolo Hwang MD, fax # [phone] per request of [facility].  Successful fax transmission was verified via rightfax.    Marychuy Pearl CMA

## 2020-07-21 NOTE — PROGRESS NOTES
RN Care Coordination Note  Incoming Call:   Patient's  is concerned that she is not reacting well to Keppra. She has a flat affect for 2 to 3 hours after taking Keppra then becomes very emotional and cries a lot. She is having a difficult time dealing with these major mood swings.   Her  is asking if you think the Keppra is necessary since he is saying no one is positive that she had a seizure. She would rather be off seizure medication completely. Please advise.   Provider Response:  Since she cannot drive for 3 months (until 10/1/2020) due to state law in the setting of the cardiac event associated with a loss of consciousness, I am fine with her stopping Keppra.     She is taking 500mg BID, as a result, she can just stop.     Please have pt/  call with any new concerns. Have them follow-up in 1 week with you by phone to see if mood is improved. If not, can discuss trying a ssri.     Thanks,   Shannen Hung MD   Neuro-oncology   7/21/2020   Outgoing Call:   Spoke with patient's , relayed entire message above. Patient will stop Keppra and call with any questions or concerns.    Sara Melgar RN, BSN  Care Coordinator   [facility]

## 2020-07-22 NOTE — PROGRESS NOTES
[facility] PHYSICIANS  SPECIALIZING IN BREAKTHROUGHS  Radiation Oncology    On Treatment Visit Note      Africa Dempsey      Date: 2020   MRN: [id number]   : 1975         Reason for Visit:  On Radiation Treatment Visit     Treatment Summary to Date   Partial brain   1335 cGy/ 4005 cGy   5/15          Subjective:   Doing well. No acute trouble. On decadron 8mg qday. Intermittent headaches, stable overall. No focal neurologic change. Single episode of emesis, none since. Energy adequate. Discontinued keppra due to mood disturbance/ tolerance.     Nursing ROS:        Objective:   /74   Pulse 51   Resp 18   Wt 64.4 kg (142 lb)   SpO2 100%   BMI 21.59 kg/m    No focal neurologic change.     Labs:  CBC RESULTS:   Recent Labs   Lab Test 20  0400   WBC 9.7   RBC 3.87   HGB 11.6*   HCT 34.9*   MCV 90   MCH 30.0   MCHC 33.2   RDW 12.2        ELECTROLYTES:  Recent Labs   Lab Test 20  0400      POTASSIUM 4.3   CHLORIDE 106   HANH 9.6   CO2 27   BUN 19   CR 0.59   *       Assessment:  45F with recurrent Glioblastoma multiforme (IDH-1 wild type, MGMT promotor site not hyper methylated)  status post resection without initial adjuvant therapy. She is now undergoing definitive chemo-RT.     Tolerating radiation therapy well.  All questions and concerns addressed.    Plan:   1. Continue current therapy.    2. Continue decadron per medical oncology.      Mosaiq chart and setup information reviewed  Ports checked                Dhruv Palencia MD

## 2020-07-22 NOTE — ORAL ONC MGMT
Oral Chemotherapy Monitoring Program    Primary Oncologist: Dr. Hung  Primary Oncology Clinic: [facility]  Cancer Diagnosis: Glioblastoma    Therapy History:  C1D1: 7/15/2020  Temodar 140 mg by mouth daily on an empty stomach for 23 days during XRT therapy    Drug Interaction Assessment: No new known drug interactions    Lab Monitoring Plan    Subjective/Objective:  Africa Dempsey is a 45 year old female contacted by phone for a follow-up visit for oral chemotherapy. I spoke with Africa and Jim today. They said she started the Temodar on the evening on 7/15. She has taken one capsule of Temodar 140 mg on an empty stomach at bedtime every evening since then. She denies constipation, mouth sores, or rash. They said they recently talked to Dr. Hung who said Africa can stop the Keppra as it makes her sad and emotional. Africa vomited once on Friday after radiation. Jim said she got in the car and drank some beet juice and then got sick. She denied any other instances of nausea or vomiting. They said she is fatigued, but she was able to walk half a mile yesterday and are hoping she can do this again today. She said since last year she has been having headache bursts where she will get a headache anywhere from 1 minute to 20 minutes long, and then it will go away. She said she has had 3-4 of these headaches today and they are not sure if it is related to the radiation, her cancer, brain swelling, or the Temodar. Prior to radiation today she felt pressure in her head, they are going to monitor if this improves after taking dexamethasone.     ORAL CHEMOTHERAPY 7/10/2020 7/22/2020   Drug Name Temodar (Temozolomide) Temodar (Temozolomide)   Current Dosage 140mg 140mg   Current Schedule Daily Daily   Cycle Details (No Data) (No Data)   Start Date of Last Cycle 7/15/2020 7/15/2020   Doses missed in last 2 weeks - 0   Adherence Assessment - Adherent   Adverse Effects - No AE identified during assessment   Any  "new drug interactions? No No   Is the dose as ordered appropriate for the patient? Yes Yes       Vitals:  BP:   BP Readings from Last 1 Encounters:   07/22/20 113/74     Wt Readings from Last 1 Encounters:   07/22/20 64.4 kg (142 lb)     Estimated body surface area is 1.76 meters squared as calculated from the following:    Height as of 10/28/19: 1.727 m (5' 8\").    Weight as of an earlier encounter on 7/22/20: 64.4 kg (142 lb).    Labs:  _  Result Component Current Result Ref Range   Sodium 137 (7/3/2020) 133 - 144 mmol/L     _  Result Component Current Result Ref Range   Potassium 4.3 (7/3/2020) 3.4 - 5.3 mmol/L     _  Result Component Current Result Ref Range   Calcium 9.6 (7/3/2020) 8.5 - 10.1 mg/dL     _  Result Component Current Result Ref Range   Magnesium 2.2 (7/3/2020) 1.6 - 2.3 mg/dL     _  Result Component Current Result Ref Range   Phosphorus 2.5 (7/3/2020) 2.5 - 4.5 mg/dL     _  Result Component Current Result Ref Range   Albumin 3.3 (L) (7/3/2020) 3.4 - 5.0 g/dL     _  Result Component Current Result Ref Range   Urea Nitrogen 19 (7/3/2020) 7 - 30 mg/dL     _  Result Component Current Result Ref Range   Creatinine 0.59 (7/3/2020) 0.52 - 1.04 mg/dL       _  Result Component Current Result Ref Range   AST 32 (7/1/2020) 14 - 36 U/L     _  Result Component Current Result Ref Range   ALT 22 (7/1/2020) <35 U/L     No results found for BILITOTAL within last 30 days.       _  Result Component Current Result Ref Range   WBC 9.7 (7/3/2020) 4.0 - 11.0 10e9/L     _  Result Component Current Result Ref Range   Hemoglobin 11.6 (L) (7/3/2020) 11.7 - 15.7 g/dL     _  Result Component Current Result Ref Range   Platelet Count 250 (7/3/2020) 150 - 450 10e9/L     No results found for ANC within last 30 days.       Assessment:  Africa is doing well on Temodar with no noted adverse effects caused by the medication as her headaches have been going on since before starting radiation and Temodar    Plan:  Continue Temodar 140 " mg by mouth daily as prescribed    Follow-Up:  Review appointment with Dr. Hung on 7/27    Julia Quiroz  Pharmacy Intern  [facility]  [phone]

## 2020-07-24 PROBLEM — C71.9 GLIOBLASTOMA (H): Status: ACTIVE | Noted: 2019-10-01

## 2020-07-25 NOTE — PROGRESS NOTES
"Afirca Dempsey is a 45 year old female who is being evaluated via a billable video visit.      The patient has been notified of following:     \"This video visit will be conducted via a call between you and your physician/provider. We have found that certain health care needs can be provided without the need for an in-person physical exam.  This service lets us provide the care you need with a video conversation.  If a prescription is necessary we can send it directly to your pharmacy.  If lab work is needed we can place an order for that and you can then stop by our lab to have the test done at a later time.    Video visits are billed at different rates depending on your insurance coverage.  Please reach out to your insurance provider with any questions.    If during the course of the call the physician/provider feels a video visit is not appropriate, you will not be charged for this service.\"    Patient has given verbal consent for Video visit? Yes    How would you like to obtain your AVS? MyChart     If you are dropped from the video visit, the video invite should be resent to: Text to cell phone: [phone]     Will anyone else be joining your video visit? No       Vitals - Patient Reported  Weight (Patient Reported): 63.5 kg (140 lb)  Height (Patient Reported): 172.7 cm (5' 7.99\")  BMI (Based on Pt Reported Ht/Wt): 21.29  Pain Score: No Pain (0)    Jamel Mckinney LPN      Video-Visit Details  Type of service:  Video Visit    Video Start Time: 7:05 AM  Video End Time: 7:25 AM    Originating Location (pt. Location): Home    Distant Location (provider location):  [facility]     Platform used for Video Visit: Blanche Hung MD    __________________________________________________    NEURO-ONCOLOGY VISIT  Jul 27, 2020    CHIEF COMPLAINT: Ms. Africa Dempsey is a 45 year old right-handed woman with a left frontal glioblastoma (IDH wildtype, MGMT promoter unmethylated), " diagnosed following resection on 10/1/2019. Following initial diagnosis, Africa deferred standard upfront chemoradiotherapy in favor of alternative/ holistic options. Unfortunately, imaging in 7/2020 showed local as well as distant disease progression. She was deemed a non-surgical candidate. She is currently undergoing chemoradiotherapy with the last fraction of radiation scheduled for 8/5.     I met with Africa and Jim () today for this follow-up visit.       HISTORY OF PRESENT ILLNESS  -Tolerating chemoradiotherapy well. Nausea is well controlled on supportive medications, denies issues with constipation on current bowel regimen, but stools are more hard.  -Now experiencing headache, resolved ibuprofen.  -On dexamethasone 8mg.   -Weaned off Keppra due to intolerance; emotional liability. Mood is better. No seizures.   -Word-finding is better, but there are still issues. Still mentally foggy, short term memory issues.  -Generalized weakness is stable. No falls.   -Slight dizziness.   -Sleep is good.  -Denies any changes in sensation.  -Off supplements.   -Last date of radiation 8/5.     REVIEW OF SYSTEMS  A comprehensive ROS negative except as in HPI.      MEDICATIONS   Current Outpatient Medications   Medication Sig Dispense Refill     dexamethasone (DECADRON) 2 MG tablet 1 tablet (2 mg) by Oral or Feeding Tube route every 8 hours (Patient taking differently: 2 mg by Oral or Feeding Tube route 2 times daily (with meals) ) 90 tablet 0     ondansetron (ZOFRAN) 4 MG tablet Take 1 tablet (4 mg) by mouth At Bedtime 30 minutes prior to chemotherapy dosing and repeat every 8 hours as needed for nausea. 30 tablet 3     pantoprazole (PROTONIX) 40 MG EC tablet Take 1 tablet (40 mg) by mouth every morning (before breakfast) 30 tablet 0     sulfamethoxazole-trimethoprim (BACTRIM DS) 800-160 MG tablet Take 1 tablet by mouth Every Mon, Wed, Fri Morning To start with the start of radiation. 12 tablet 0     temozolomide  (TEMODAR) 140 MG capsule Take 1 capsule (140 mg) by mouth daily 23 capsule 0     DRUG ALLERGIES   Allergies   Allergen Reactions     Amoxicillin Hives       ONCOLOGIC HISTORY  -9/2019 PRESENTATION: Progressively worsening headaches over the past 3 weeks   -9/30/2019 MR brain imaging demonstrated a left frontal multi-lobed rim-enhancing mass lesion with associated edema.   -10/1/2019 SURGERY: Craniotomy for mass resection, gross total.   PATHOLOGY: Glioblastoma; IDH1-R132H wildtype/ IDH1, IHD2 wildtype by next generation sequencing. TP53 mutated.  BRAF, PTEN not mutated. MGMT promoter unmethylated.  -10/28/2019 NEURO-ONC: Recommending chemoradiotherapy, however, Africa is opting for alternative/ holistic options.   -7/1/2020 MRB with enhancement around the resection cavity, increased in size with extension into the corpus callosum. There are at least 3 new lesions in the left cerebral hemisphere, distant from the resection cavity.   -7/6/2020 NEURO-ONC: Clinically better on dexamethasone and Keppra. Wanting to initiate standard upfront treatment.   -7/16 - 8/5/2020 CHEMORADS: 40 Gy in 15 fractions with concurrent temozolomide.   -7/27/2020 NEURO-ONC: Continue chemoradiotherapy. Discussed Optune; waiting on this option.     SOCIAL HISTORY   Tobacco use: Never smoker.   Alcohol use: Social.   , 2 children.   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      PHYSICAL EXAMINATION  -Generally well appearing.  -Respiratory: No audible wheezing.   -Skin: No facial rashes.  -Psychiatric: Normal mood and affect. Pleasant, talkative.  -Neurologic:   MENTAL STATUS:     Alert, oriented to date.    Recall: Intact, but needing assistance with timing.    Speech fluent. Wording finding issues present.    Comprehension intact.     CRANIAL NERVES:     Pupils are equal, round, reactive to light.     Extraocular movements full, patient denies diplopia.     Symmetric facial movements.   Hearing intact.   With  "normal phonation, no dysfunction of the palate or tongue.   MOTOR: Antigravity in arms.  SENSATION: Intact to light touch throughout.   COORDINATION: Intact bilaterally.     The rest of a comprehensive physical examination is deferred due to PHE (public health emergency) video visit restrictions.        MEDICAL RECORDS  Obtained and personally reviewed all available outside medical records in addition to reviewing any records available in our electronic system.     LABS  Personally reviewed all available lab results.     IMAGING  No new neuro-imaging to review.        IMPRESSION  Clinic was spent discussing in detail the nature of this tumor in light of her current treatment plan. This was in addition to providing emotional support, answering questions pertaining to my recommendations, and devising the treatment plan as outlined below.     Clinically, Africa is doing well and tolerating upfront treatment. Denies constipation, but stools are more firm. No nausea. She has had a headache in the morning that past few days that resolved with ibuprofen. Dexamethasone is currently dosed at 8mg in the morning. Due to emotional liability, Africa weaned off Keppra. She denies any events concerning for seizures and her mood is better.     With regard to cancer-directed therapy, the plan is for Africa to complete chemoradiotherapy. Following completion of chemoradiotherapy, there will be a 1 month period without treatment and a new \"baseline\" MR brain scan will be performed. At this follow-up appointment, we will discuss starting adjuvant temozolomide cycle 1 dosed at 150mg/m2 for days 1-5 of a 28 day cycle.     Today, we discussed the use of Optune therapy and Africa is going to further consider this option.     PROBLEM LIST  Glioblastoma, MGMT unmethylated and IDH1 wildtype by NGS  Headaches    PLAN  -CANCER-DIRECTED THERAPY-  -Last dose of radiation is on 8/5.     Last dose of temozolomide will be on 8/4.   -Last dose of " Bactrim will be on 8/5.   -Use Zofran as needed.   -Recommended adding MiraLax to her bowel regimen.  -Optune; to further consider.    -STEROIDS-  -Dexamethasone taper;   8/7 - 8/11 6mg daily   8/12 - 8/16 4mg daily   8/17 2mg daily    -SEIZURE MANAGEMENT-  -History of an event associated with loss of consciousness.   -No driving for 3 months (10/1/2020).  -Weaned off Keppra. Did not tolerate Keprpa due to mood issues.   -If a anti-seizure medication becomes indicated, recommend starting Vimpat.     -Quality of life/ MOOD/ FATIGUE-  -Denies any mood issues.  -Continue to monitor mood as untreated/ undertreated depression can worsen fatigue, dysorexia, and quality of life.    -BRADYCARDIA-  -HR in the 40's.   -Continue to follow with cardiology.     Return to clinic on 8/31 with me + labs + imaging.     In the meantime, Africa knows to call with questions or concerns or to report new complaints and can be seen sooner if needed.    Shannen Hung MD  Neuro-oncology

## 2020-07-25 NOTE — PATIENT INSTRUCTIONS
Last dose of radiation is on 8/5.   Last dose of temozolomide will be on 8/4.   Last dose of Bactrim will be on 8/5.   Use Zofran as needed.     Dexamethasone taper;    8/7 - 8/11 6mg daily   8/12 - 8/16 4mg daily   8/17 2mg daily    Optune; to further consider.    Repeat imaging with labs at the end of August (8/28 or 8/31) at [facility] if interested.     Return to clinic on 8/31 + labs + imaging.    Shannen Hung MD  Neuro-oncology  7/27/2020

## 2020-07-27 NOTE — LETTER
"    7/27/2020         RE: Africa Dempsey  [address]  [place] [address]        Dear Colleague,    Thank you for referring your patient, Africa Dempsey, to the [facility]. Please see a copy of my visit note below.    Africa Dempsey is a 45 year old female who is being evaluated via a billable video visit.      The patient has been notified of following:     \"This video visit will be conducted via a call between you and your physician/provider. We have found that certain health care needs can be provided without the need for an in-person physical exam.  This service lets us provide the care you need with a video conversation.  If a prescription is necessary we can send it directly to your pharmacy.  If lab work is needed we can place an order for that and you can then stop by our lab to have the test done at a later time.    Video visits are billed at different rates depending on your insurance coverage.  Please reach out to your insurance provider with any questions.    If during the course of the call the physician/provider feels a video visit is not appropriate, you will not be charged for this service.\"    Patient has given verbal consent for Video visit? Yes    How would you like to obtain your AVS? MyChart     If you are dropped from the video visit, the video invite should be resent to: Text to cell phone: [phone]     Will anyone else be joining your video visit? No       Vitals - Patient Reported  Weight (Patient Reported): 63.5 kg (140 lb)  Height (Patient Reported): 172.7 cm (5' 7.99\")  BMI (Based on Pt Reported Ht/Wt): 21.29  Pain Score: No Pain (0)    Jamel Mckinney LPN      Video-Visit Details  Type of service:  Video Visit    Video Start Time: 7:05 AM  Video End Time: 7:25 AM    Originating Location (pt. Location): Home    Distant Location (provider location):  [facility]     Platform used for Video Visit: Blanche Hung, " MD    __________________________________________________    NEURO-ONCOLOGY VISIT  Jul 27, 2020    CHIEF COMPLAINT: Ms. Africa Dempsey is a 45 year old right-handed woman with a left frontal glioblastoma (IDH wildtype, MGMT promoter unmethylated), diagnosed following resection on 10/1/2019. Following initial diagnosis, Africa deferred standard upfront chemoradiotherapy in favor of alternative/ holistic options. Unfortunately, imaging in 7/2020 showed local as well as distant disease progression. She was deemed a non-surgical candidate. She is currently undergoing chemoradiotherapy with the last fraction of radiation scheduled for 8/5.     I met with Africa and Jim () today for this follow-up visit.       HISTORY OF PRESENT ILLNESS  -Tolerating chemoradiotherapy well. Nausea is well controlled on supportive medications, denies issues with constipation on current bowel regimen, but stools are more hard.  -Now experiencing headache, resolved ibuprofen.  -On dexamethasone 8mg.   -Weaned off Keppra due to intolerance; emotional liability. Mood is better. No seizures.   -Word-finding is better, but there are still issues. Still mentally foggy, short term memory issues.  -Generalized weakness is stable. No falls.   -Slight dizziness.   -Sleep is good.  -Denies any changes in sensation.  -Off supplements.   -Last date of radiation 8/5.     REVIEW OF SYSTEMS  A comprehensive ROS negative except as in HPI.      MEDICATIONS   Current Outpatient Medications   Medication Sig Dispense Refill     dexamethasone (DECADRON) 2 MG tablet 1 tablet (2 mg) by Oral or Feeding Tube route every 8 hours (Patient taking differently: 2 mg by Oral or Feeding Tube route 2 times daily (with meals) ) 90 tablet 0     ondansetron (ZOFRAN) 4 MG tablet Take 1 tablet (4 mg) by mouth At Bedtime 30 minutes prior to chemotherapy dosing and repeat every 8 hours as needed for nausea. 30 tablet 3     pantoprazole (PROTONIX) 40 MG EC tablet Take 1  tablet (40 mg) by mouth every morning (before breakfast) 30 tablet 0     sulfamethoxazole-trimethoprim (BACTRIM DS) 800-160 MG tablet Take 1 tablet by mouth Every Mon, Wed, Fri Morning To start with the start of radiation. 12 tablet 0     temozolomide (TEMODAR) 140 MG capsule Take 1 capsule (140 mg) by mouth daily 23 capsule 0     DRUG ALLERGIES   Allergies   Allergen Reactions     Amoxicillin Hives       ONCOLOGIC HISTORY  -9/2019 PRESENTATION: Progressively worsening headaches over the past 3 weeks   -9/30/2019 MR brain imaging demonstrated a left frontal multi-lobed rim-enhancing mass lesion with associated edema.   -10/1/2019 SURGERY: Craniotomy for mass resection, gross total.   PATHOLOGY: Glioblastoma; IDH1-R132H wildtype/ IDH1, IHD2 wildtype by next generation sequencing. TP53 mutated.  BRAF, PTEN not mutated. MGMT promoter unmethylated.  -10/28/2019 NEURO-ONC: Recommending chemoradiotherapy, however, Africa is opting for alternative/ holistic options.   -7/1/2020 MRB with enhancement around the resection cavity, increased in size with extension into the corpus callosum. There are at least 3 new lesions in the left cerebral hemisphere, distant from the resection cavity.   -7/6/2020 NEURO-ONC: Clinically better on dexamethasone and Keppra. Wanting to initiate standard upfront treatment.   -7/16 - 8/5/2020 CHEMORADS: 40 Gy in 15 fractions with concurrent temozolomide.   -7/27/2020 NEURO-ONC: Continue chemoradiotherapy. Discussed Optune; waiting on this option.     SOCIAL HISTORY   Tobacco use: Never smoker.   Alcohol use: Social.   , 2 children.   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      PHYSICAL EXAMINATION  -Generally well appearing.  -Respiratory: No audible wheezing.   -Skin: No facial rashes.  -Psychiatric: Normal mood and affect. Pleasant, talkative.  -Neurologic:   MENTAL STATUS:     Alert, oriented to date.    Recall: Intact, but needing assistance with timing.    Speech  "fluent. Wording finding issues present.    Comprehension intact.     CRANIAL NERVES:     Pupils are equal, round, reactive to light.     Extraocular movements full, patient denies diplopia.     Symmetric facial movements.   Hearing intact.   With normal phonation, no dysfunction of the palate or tongue.   MOTOR: Antigravity in arms.  SENSATION: Intact to light touch throughout.   COORDINATION: Intact bilaterally.     The rest of a comprehensive physical examination is deferred due to PHE (public health emergency) video visit restrictions.        MEDICAL RECORDS  Obtained and personally reviewed all available outside medical records in addition to reviewing any records available in our electronic system.     LABS  Personally reviewed all available lab results.     IMAGING  No new neuro-imaging to review.        IMPRESSION  Clinic was spent discussing in detail the nature of this tumor in light of her current treatment plan. This was in addition to providing emotional support, answering questions pertaining to my recommendations, and devising the treatment plan as outlined below.     Clinically, Africa is doing well and tolerating upfront treatment. Denies constipation, but stools are more firm. No nausea. She has had a headache in the morning that past few days that resolved with ibuprofen. Dexamethasone is currently dosed at 8mg in the morning. Due to emotional liability, Africa weaned off Keppra. She denies any events concerning for seizures and her mood is better.     With regard to cancer-directed therapy, the plan is for Africa to complete chemoradiotherapy. Following completion of chemoradiotherapy, there will be a 1 month period without treatment and a new \"baseline\" MR brain scan will be performed. At this follow-up appointment, we will discuss starting adjuvant temozolomide cycle 1 dosed at 150mg/m2 for days 1-5 of a 28 day cycle.     Today, we discussed the use of Optune therapy and Africa is going to " further consider this option.     PROBLEM LIST  Glioblastoma, MGMT unmethylated and IDH1 wildtype by NGS  Headaches    PLAN  -CANCER-DIRECTED THERAPY-  -Last dose of radiation is on 8/5.     Last dose of temozolomide will be on 8/4.   -Last dose of Bactrim will be on 8/5.   -Use Zofran as needed.   -Recommended adding MiraLax to her bowel regimen.  -Optune; to further consider.    -STEROIDS-  -Dexamethasone taper;   8/7 - 8/11 6mg daily   8/12 - 8/16 4mg daily   8/17 2mg daily    -SEIZURE MANAGEMENT-  -History of an event associated with loss of consciousness.   -No driving for 3 months (10/1/2020).  -Weaned off Keppra. Did not tolerate Keprpa due to mood issues.   -If a anti-seizure medication becomes indicated, recommend starting Vimpat.     -Quality of life/ MOOD/ FATIGUE-  -Denies any mood issues.  -Continue to monitor mood as untreated/ undertreated depression can worsen fatigue, dysorexia, and quality of life.    -BRADYCARDIA-  -HR in the 40's.   -Continue to follow with cardiology.     Return to clinic on 8/31 with me + labs + imaging.     In the meantime, Africa knows to call with questions or concerns or to report new complaints and can be seen sooner if needed.    Shannen Hung MD  Neuro-oncology

## 2020-07-29 NOTE — LETTER
2020         RE: Africa Dempsey  [address]  [place] [facility] [address]        Dear Colleague,    Thank you for referring your patient, Africa Dempsey, to the [facility]. Please see a copy of my visit note below.    [facility]  SPECIALIZING IN BREAKTHROUGHS  Radiation Oncology    On Treatment Visit Note      Africa Dempsey      Date: 2020   MRN: [id number]   : 1975         Reason for Visit:  On Radiation Treatment Visit     Treatment Summary to Date   Partial brain   2670 cGy/ 4005 cGy   10/15          Subjective:   Doing well. No acute trouble. On decadron 8mg qday. Intermittent headaches, stable overall, controlled with addition of ibuprofen once day if needed. No focal neurologic change. Single episode of emesis, none since. Energy adequate. Discontinued keppra due to mood disturbance/ tolerance.     Nursing ROS:        Objective:   /76   Pulse 64   Wt 63.5 kg (140 lb)   BMI 21.29 kg/m    No focal neurologic change. + Stable mild word finding difficulty.     Labs:  CBC RESULTS:   Lab Results   Component Value Date    WBC 10.8 2020     Lab Results   Component Value Date    RBC 4.47 2020     Lab Results   Component Value Date    HGB 13.4 2020     Lab Results   Component Value Date    HCT 40.5 2020     No components found for: MCT  Lab Results   Component Value Date    MCV 91 2020     Lab Results   Component Value Date    MCH 30.0 2020     Lab Results   Component Value Date    MCHC 33.1 2020     Lab Results   Component Value Date    RDW 12.7 2020     Lab Results   Component Value Date     2020         Assessment:  45F with recurrent Glioblastoma multiforme (IDH-1 wild type, MGMT promotor site not hyper methylated)  status post resection without initial adjuvant therapy. She is now undergoing definitive chemo-RT.     Tolerating radiation therapy well.  All questions and concerns  addressed.    Plan:   1. Continue current therapy.    2. Continue decadron per medical oncology.  3. Headaches are intermittent right now and controlled with addition of single tab of ibuprofen. If more consistent or worsens, would consider increasing steroid dose.       Eferio chart and setup information reviewed  Ports checked           Dhruv Palencia MD

## 2020-07-29 NOTE — PROGRESS NOTES
[facility] PHYSICIANS  SPECIALIZING IN BREAKTHROUGHS  Radiation Oncology    On Treatment Visit Note      Africa Dempsey      Date: 2020   MRN: [id number]   : 1975         Reason for Visit:  On Radiation Treatment Visit     Treatment Summary to Date   Partial brain   2670 cGy/ 4005 cGy   10/15          Subjective:   Doing well. No acute trouble. On decadron 8mg qday. Intermittent headaches, stable overall, controlled with addition of ibuprofen once day if needed. No focal neurologic change. Single episode of emesis, none since. Energy adequate. Discontinued keppra due to mood disturbance/ tolerance.     Nursing ROS:        Objective:   /76   Pulse 64   Wt 63.5 kg (140 lb)   BMI 21.29 kg/m    No focal neurologic change. + Stable mild word finding difficulty.     Labs:  CBC RESULTS:   Lab Results   Component Value Date    WBC 10.8 2020     Lab Results   Component Value Date    RBC 4.47 2020     Lab Results   Component Value Date    HGB 13.4 2020     Lab Results   Component Value Date    HCT 40.5 2020     No components found for: MCT  Lab Results   Component Value Date    MCV 91 2020     Lab Results   Component Value Date    MCH 30.0 2020     Lab Results   Component Value Date    MCHC 33.1 2020     Lab Results   Component Value Date    RDW 12.7 2020     Lab Results   Component Value Date     2020         Assessment:  45F with recurrent Glioblastoma multiforme (IDH-1 wild type, MGMT promotor site not hyper methylated)  status post resection without initial adjuvant therapy. She is now undergoing definitive chemo-RT.     Tolerating radiation therapy well.  All questions and concerns addressed.    Plan:   1. Continue current therapy.    2. Continue decadron per medical oncology.  3. Headaches are intermittent right now and controlled with addition of single tab of ibuprofen. If more consistent or worsens, would consider  increasing steroid dose.       GramVaani chart and setup information reviewed  Ports checked                Dhruv Palencia MD

## 2020-08-04 NOTE — ORAL ONC MGMT
Oral Chemotherapy Monitoring Program     Placed call to patient in follow up of Temodar/XRT therapy. All of Africa and Karo's questions were answered to their stated satisfaction. Africa will continue Temodar every evening through tomorrow evening (tomorrow is her last day of radiation). She expressed understanding and agreement with this plan and thanked me for the call and care.    Mac CamposD  [facility]  [phone]  August 4, 2020

## 2020-08-05 NOTE — LETTER
2020         RE: Africa Dempsey  [address]  [facility] [address]        Dear Colleague,    Thank you for referring your patient, Africa Dempsey, to the RAD[facility]ATION TH[facility]Y CENTER. Please see a copy of my visit note below.    [facility] PHYSICIANS  SPECIALIZING IN BREAKTHROUGHS  Radiation Oncology    On Treatment Visit Note      Africa Dempsey      Date: 2020   MRN: [id number]   : 1975         Reason for Visit:  On Radiation Treatment Visit     Treatment Summary to Date   Partial brain   4005 cGy/ 4005 cGy   15/15          Subjective:   Doing well. No acute trouble. On decadron 8mg qday. Intermittent headaches, stable overall, controlled with addition of ibuprofen once day if needed. No focal neurologic change. Single episode of emesis, none since. Energy adequate. Discontinued keppra due to mood disturbance/ tolerance.     Nursing ROS:        Objective:   /72   Pulse 55   Resp 18   Wt 65.8 kg (145 lb)   SpO2 100%   BMI 22.05 kg/m      No focal neurologic change. + Stable mild word finding difficulty.     Labs:  Lab Results   Component Value Date    WBC 9.6 2020     Lab Results   Component Value Date    RBC 4.23 2020     Lab Results   Component Value Date    HGB 12.8 2020     Lab Results   Component Value Date    HCT 38.9 2020     No components found for: MCT  Lab Results   Component Value Date    MCV 92 2020     Lab Results   Component Value Date    MCH 30.3 2020     Lab Results   Component Value Date    MCHC 32.9 2020     Lab Results   Component Value Date    RDW 13.3 2020     Lab Results   Component Value Date     2020           Assessment:  45F with recurrent Glioblastoma multiforme (IDH-1 wild type, MGMT promotor site not hyper methylated)  status post resection without initial adjuvant therapy. She is now undergoing definitive chemo-RT.     Tolerating radiation therapy well.  All questions and  concerns addressed.    Plan:   1. Continue current therapy.  EOT today. RTC in 1 month.   2. Continue decadron per medical oncology.  3. Headaches are intermittent right now and controlled with addition of single tab of ibuprofen. If more consistent or worsens, would consider increasing steroid dose.   4. MRI scheduled for 8/28/20 with follow up with Dr. Hung on 8/31/20.       Mosaiq chart and setup information reviewed  Ports checked                Dhruv Palencia MD

## 2020-08-05 NOTE — PROGRESS NOTES
Department of Radiation Oncology  Radiation Therapy Center  [facility] [place] [address]  [phone]       Radiotherapy Treatment Summary          Treatment dates: 20-20    PATIENT: Africa Dempsey  MEDICAL RECORD NO: [id number]   : 1975    DIAGNOSIS: recurrent Glioblastoma multiforme status post resection without initial adjuvant therapy  PATHOLOGY:  Final pathology demonstrated glioblastoma multiforme, IDH 1 wild-type, MGMT promoter site unmethylated.                            STAGE: Grade IV  INTENT OF RADIOTHERAPY: definitive chemo-RT.   CONCURRENT SYSTEMIC THERAPY:  Yes, Temodar    ONCOLOGIC HISTORY:    Ms. Dempsey is a 45 year old female recurrent Glioblastoma multiforme (IDH-1 wild type, MGMT promotor site not hyper methylated)  status post resection without initial adjuvant therapy.      The patient's oncologic history dates to 2019 when she presented with progressively worsening headaches.  Imaging at that time including MRI of the brain demonstrated a left frontal enhancing mass concerning for primary brain tumor.  On 10/1/2019 the patient underwent gross total resection by Dr. Yo of the lesion.  Final pathology demonstrated glioblastoma multiforme, IDH 1 wild-type, MGMT promoter site unmethylated.  Adjuvant chemoradiation therapy following surgery was discussed with the patient, however patient declined.  She was followed with subsequent surveillance imaging and was without evidence of progressive disease until scans on 2020.  On 2020 the patient noticed headache and progressive word finding difficulty.  Imaging including brain MRI on 2020 demonstrated concern for multifocal areas of enhancement including the prior resection cavity in the left frontal lobe as well as other potential sites of disease including an adjacent left frontal lobe lesion, left temporal lobe lesion, and T2 FLAIR abnormality involving the left hippocampus  region.  Reported the patient underwent a syncopal episode and cardiac arrest leading to CPR at the outside hospital.  She was eventually transferred to [facility] for further management.  She is evaluated by cardiology who felt the edema could have possibly contributed to arrhythmia.  She was initiated on dopamine drip, eventually tapered off.  ECHO was negative. The patient's case was discussed and neuro-oncology tumor board recommendation was to proceed with definitive management chemoradiation therapy.  Patient met with Dr. Hung medical oncology who discussed concurrent and adjuvant Temodar.  Patient subsequently presented in radiation oncology clinic to discuss next steps in management regarding radiation therapy.       SITE OF TREATMENT: Partial brain    DATES  OF TREATMENT: 7/16/20-8/5/20    TOTAL DOSE OF TREATMENT: 4005 cGy/ 4005 cGy    DOSE PER FRACTION OF TREATMENT: 267 cGy x 15 fractions       COMMENT/TOXICITY:     No acute trouble. On decadron 8mg qday. Intermittent headaches, stable overall, controlled with addition of ibuprofen once day if needed. No focal neurologic change. Single episode of emesis, none since. Energy adequate. Discontinued keppra due to mood disturbance/ tolerance.     No focal neurologic change. + Stable mild word finding difficulty.           PAIN MANAGEMENT:   Ibuprofen prn  Decadron                           FOLLOW UP PLAN:  1. RTC in 1 month.   2. Continue decadron per medical oncology.  3. Headaches are intermittent right now and controlled with addition of single tab of ibuprofen. If more consistent or worsens, would consider increasing steroid dose.   4. MRI scheduled for 8/28/20 with follow up with Dr. Hung on 8/31/20.    Radiation Oncologist: Dhruv Palencia M.D.  Department of Radiation Oncology  [facility]

## 2020-08-05 NOTE — PROGRESS NOTES
"RN Care Coordination Note  Incoming Call:   I spoke with Africa today. Her mood has improved drastically since discontinuing the Keppra. She is able to think and speak clearly and her energy level is good. The only bothersome symptom is the occasional pressure headache relieved by 1 ibuprofen tablet. Her last radiation treatment is today and she has a few questions.     1. When can she discontinue the Bactrim?     2. She has 2 tablets of Temodar left but her last dose is supposed to be tonight. She is unsure why she has 2 left, insisting she did not miss a dose. Should she just save the tablet?     3. She is currently taking 4 mg dexamethasone daily and wants to know when she can start tapering off?   Provider Response:  Per pt instructions from the last visit;   \"Last dose of radiation is on 8/5.   Last dose of temozolomide will be on 8/4. (So, no more chemo, just hold on to them)   Last dose of Bactrim will be on 8/5.   Use Zofran as needed.     Dexamethasone taper;               8/7 - 8/11 6mg daily               8/12 - 8/16 4mg daily               8/17 2mg daily\"     If she is having headaches, I would have go up per above, other stay on 4mg until 8/16 and then, decrease per taper.     Shannen Hung MD   Neuro-oncology   8/5/2020   Outgoing Call:   Called patient, relayed entire message above. Patient verbalized understanding and will call with any questions or concerns.    Sara Melgar RN, BSN  Care Coordinator   [facility]          "

## 2020-08-05 NOTE — PROGRESS NOTES
[facility] PHYSICIANS  SPECIALIZING IN BREAKTHROUGHS  Radiation Oncology    On Treatment Visit Note      Africa Dempsey      Date: 2020   MRN: [id number]   : 1975         Reason for Visit:  On Radiation Treatment Visit     Treatment Summary to Date   Partial brain   4005 cGy/ 4005 cGy   15/15          Subjective:   Doing well. No acute trouble. On decadron 8mg qday. Intermittent headaches, stable overall, controlled with addition of ibuprofen once day if needed. No focal neurologic change. Single episode of emesis, none since. Energy adequate. Discontinued keppra due to mood disturbance/ tolerance.     Nursing ROS:        Objective:   /72   Pulse 55   Resp 18   Wt 65.8 kg (145 lb)   SpO2 100%   BMI 22.05 kg/m      No focal neurologic change. + Stable mild word finding difficulty.     Labs:  Lab Results   Component Value Date    WBC 9.6 2020     Lab Results   Component Value Date    RBC 4.23 2020     Lab Results   Component Value Date    HGB 12.8 2020     Lab Results   Component Value Date    HCT 38.9 2020     No components found for: MCT  Lab Results   Component Value Date    MCV 92 2020     Lab Results   Component Value Date    MCH 30.3 2020     Lab Results   Component Value Date    MCHC 32.9 2020     Lab Results   Component Value Date    RDW 13.3 2020     Lab Results   Component Value Date     2020           Assessment:  45F with recurrent Glioblastoma multiforme (IDH-1 wild type, MGMT promotor site not hyper methylated)  status post resection without initial adjuvant therapy. She is now undergoing definitive chemo-RT.     Tolerating radiation therapy well.  All questions and concerns addressed.    Plan:   1. Continue current therapy.  EOT today. RTC in 1 month.   2. Continue decadron per medical oncology.  3. Headaches are intermittent right now and controlled with addition of single tab of ibuprofen. If more  consistent or worsens, would consider increasing steroid dose.   4. MRI scheduled for 8/28/20 with follow up with Dr. Hung on 8/31/20.       Mosaiq chart and setup information reviewed  Ports checked                Dhruv Palencia MD

## 2020-08-18 NOTE — PROGRESS NOTES
RN Care Coordination Note  Incoming Call:   Patient's spouse called to report an increase in headaches since decreasing dexamethasone to 2 mg daily. He wants to know if it's ok to increase back to 4 mg daily.  Provider Response:  Please increase back to 4.   Thanks,   Shannen Hung MD   Neuro-oncology   8/18/2020   Outgoing Call:   Called patient's spouse, left message that it is ok to increase dexamethasone to 4 mg daily.    Sara Melgar RN, BSN  Care Coordinator   [facility]

## 2020-08-25 NOTE — PROGRESS NOTES
RN Care Coordination Note  Incoming Call:   Patient's  called to report patient had a seizure this morning that lasted approximately 20 seconds.     Patient has been taking 4 mg of Dexamethasone but has had some problems with her memory the past week. She decided to try to decrease Dex this AM to 2 mg. She experienced a severe headache and took Ibuprofen. Patient's daughter witnessed her having the seizure while sitting in a chair. She has now taken the additional 2 mg of Dex, headache has improved, and she does not want to go to the hospital.     Patient is not on any seizure medication. Keppra was discontinued on 7/21 d/t major side effects. She has MRI scheduled for 8/28/20 and follow up with you on 8/31. Please advise.      Provider Response:  Agree with not tapering dexamethasone again.     Does she want to be on a seizure medication? If so, my recommendation would be Vimpat, but sometimes it is not covered by insurance.     I would keep appts as is at this time.     Shannen Hung MD   Neuro-oncology   8/25/2020   Outgoing Call:   Spoke with patient's , relay information on Vimpat. He verbalized understanding and will get Vimpat from the pharmacy.     Sara Melgar RN, BSN  Care Coordinator   [facility]

## 2020-08-25 NOTE — TELEPHONE ENCOUNTER
Prior Authorization Retail Medication Request    Medication/Dose: Lacosamide (VIMPAT) 100 MG TABS tablet   ICD code (if different than what is on RX):  Seizure disorder (H) (G40.909)   Previously Tried and Failed:    Rationale:     Insurance Name:  [facility]  Insurance ID: [id number]      Pharmacy Information (if different than what is on RX)  Name:  [facility] - [place], [place] - [address] [place] [address]   Phone:  [phone]

## 2020-08-26 NOTE — TELEPHONE ENCOUNTER
Central Prior Authorization Team   Phone: [phone]      Prior Authorization Not Needed per Insurance    08/26/2020  Medication: Lacosamide (VIMPAT) 100 MG TABS tablet - NOT NEEDED  Insurance Company: [employer] - Phone [phone] Fax [phone]  Expected CoPay:      Pharmacy Filling the Rx: [facility] - [address]  Pharmacy Notified: Yes  Patient Notified: Yes    Pharmacy received a paid claim and pt picked up.

## 2020-08-30 NOTE — PROGRESS NOTES
"Africa Dempsey is a 45 year old female who is being evaluated via a billable video visit.      The patient has been notified of following:     \"This video visit will be conducted via a call between you and your physician/provider. We have found that certain health care needs can be provided without the need for an in-person physical exam.  This service lets us provide the care you need with a video conversation.  If a prescription is necessary we can send it directly to your pharmacy.  If lab work is needed we can place an order for that and you can then stop by our lab to have the test done at a later time.    Video visits are billed at different rates depending on your insurance coverage.  Please reach out to your insurance provider with any questions.    If during the course of the call the physician/provider feels a video visit is not appropriate, you will not be charged for this service.\"    Patient has given verbal consent for Video visit? Yes  How would you like to obtain your AVS? MyChart  If you are dropped from the video visit, the video invite should be resent to: Send to e-mail at: [email or web address]  Will anyone else be joining your video visit? No  with patient does not need additional invitation.    Patient needs a refill of dexamethasone.    DANIEL Carpenter       Video-Visit Details  Type of service:  Video Visit    Video Start Time: 8:01 AM  Video End Time: 8:51 AM    Originating Location (pt. Location): Home    Distant Location (provider location):  [facility]     Platform used for Video Visit: Blanche Hung MD    __________________________________________________    NEURO-ONCOLOGY VISIT  Aug 31, 2020    CHIEF COMPLAINT: Ms. Africa Dempsey is a 45 year old right-handed woman with a left frontal glioblastoma (IDH wildtype, MGMT promoter unmethylated), diagnosed following resection on 10/1/2019. Following initial diagnosis, Africa deferred " standard upfront chemoradiotherapy in favor of alternative/ holistic options. Unfortunately, imaging in 7/2020 showed local as well as distant disease progression. She was deemed a non-surgical candidate. She completed chemoradiotherapy on 8/52020. The plan is now to initiate adjuvant dosed temozolomide.     I met with Africa and Jim () today for this follow-up visit.       HISTORY OF PRESENT ILLNESS  -Enjoyed break from treatment.   -Unfortunately on 8/25, experienced a seizure event in the setting of self-tapering dexamethasone. The event was described as altered consciences for 20 seconds. Was also off Keppra at the time. Never started Vimpat.  -On dexamethasone 4mg daily.  -Very fatigued. Generalized weakness is stable. No focal weakness. No falls. After a 15 minute walk, she is exhausted.   -Currently experiencing short (10 minutes), intermittent, mild headahces. Resolved with time.  -Mood is ok, very frustrated with the aphasia and dyslexia.   -Word-finding is worse. Still mentally foggy, short term memory issues.  -Slight dizziness with standing.   -Sleep is good.  -Denies any changes in sensation.  -Off supplements.     REVIEW OF SYSTEMS  A comprehensive ROS negative except as in HPI.      MEDICATIONS   Current Outpatient Medications   Medication Sig Dispense Refill     dexamethasone (DECADRON) 2 MG tablet Take 1 tablet (2 mg) by mouth daily (with breakfast) 30 tablet 1     Lacosamide (VIMPAT) 100 MG TABS tablet Take 0.5 tablets (50 mg) by mouth 2 times daily for 7 days, THEN 1 tablet (100 mg) 2 times daily for 21 days. Call for a refill. 49 tablet 0     ondansetron (ZOFRAN) 4 MG tablet Take 1 tablet (4 mg) by mouth At Bedtime 30 minutes prior to chemotherapy dosing and repeat every 8 hours as needed for nausea. 30 tablet 3     temozolomide (TEMODAR) 140 MG capsule Take 2 capsules (280 mg) by mouth daily for 5 days Take ondansetron 30-60 min before temozolomide. Take at bedtime on an empty stomach.  10 capsule 0     DRUG ALLERGIES   Allergies   Allergen Reactions     Amoxicillin Hives       ONCOLOGIC HISTORY  -9/2019 PRESENTATION: Progressively worsening headaches over the past 3 weeks   -9/30/2019 MR brain imaging demonstrated a left frontal multi-lobed rim-enhancing mass lesion with associated edema.   -10/1/2019 SURGERY: Craniotomy for mass resection, gross total.   PATHOLOGY: Glioblastoma; IDH1-R132H wildtype/ IDH1, IHD2 wildtype by next generation sequencing. TP53 mutated.  BRAF, PTEN not mutated. MGMT promoter unmethylated.  -10/28/2019 NEURO-ONC: Recommending chemoradiotherapy, however, Africa is opting for alternative/ holistic options.   -7/1/2020 MRB with enhancement around the resection cavity, increased in size with extension into the corpus callosum. There are at least 3 new lesions in the left cerebral hemisphere, distant from the resection cavity.   -7/6/2020 NEURO-ONC: Clinically better on dexamethasone and Keppra. Wanting to initiate standard upfront treatment.   -7/16 - 8/5/2020 CHEMORADS: 40 Gy in 15 fractions with concurrent temozolomide.   -7/27/2020 NEURO-ONC: Continue chemoradiotherapy. Discussed Optune; waiting on this option.   -8/25/2020 SZ: Started Vimpat.   -8/31/2020 NEURO-ONC/ MRB/ CHEMO: Clinically overall stable. Started Vimpat, well tolerated. Dexamethasone 4mg daily. Imaging with concern for early disease progression. Initiating adjuvant temozolomide 150mg/m2 (280mg), cycle 1 to start on 9/2. Optune to be starting.     SOCIAL HISTORY   Tobacco use: Never smoker.   Alcohol use: Social.   , 2 children.   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      PHYSICAL EXAMINATION  -Generally well appearing.  -Respiratory: No audible wheezing.   -Skin: No facial rashes.  -Psychiatric: Normal mood and affect. Pleasant, talkative.  -Neurologic:   MENTAL STATUS:     Alert, oriented to date but difficult with numbers.    Recall: Intact, but needing assistance with  timing.    Speech is fragmented.    Worsening wording finding issues present.    Comprehension intact.     CRANIAL NERVES:     Pupils are equal, round, reactive to light.     Extraocular movements full, patient denies diplopia.     R facial droop, mild.   Hearing intact.   With normal phonation, no dysfunction of the palate or tongue.   MOTOR: Antigravity in arms. No pronation or drift.   SENSATION: Intact to light touch throughout.   COORDINATION: Intact bilaterally.     The rest of a comprehensive physical examination is deferred due to PHE (public health emergency) video visit restrictions.        MEDICAL RECORDS  Obtained and personally reviewed all available outside medical records in addition to reviewing any records available in our electronic system.     LABS  Personally reviewed all available lab results.     IMAGING  Personally reviewed MR brain imaging from last week and compared to pre-radiation imaging. To my eye, there is an increase in contrast enhancement and T2 FLAIR with midline shift.     Imaging was shown to and results were reviewed with Africa and Jim.     Imaging and case will be reviewed and discussed at Brain Tumor Conference.        IMPRESSION  Clinic was spent discussing in detail the nature of this tumor in light of her baseline post-radiation imaging. This was in addition to providing emotional support, answering questions pertaining to my recommendations, and devising the plan as outlined below.     Clinically, Africa is doing well. However, she did experience a breakthrough seizure event in the setting of self-tapering dexamethasone and while being off Keppra. Vimpat was never started, awaiting approval by insurance. Current dexamethasone dose is 4mg. She has not had any more events concerning for seizures. She continues to have mild, self-resolving, intermittent headaches in the morning.    Imaging with concern for early disease progression. Will repeat in 1 month with perfusion.      With regard to cancer-directed therapy, a multimodal treatment approach first involves maximal surgical resection, followed by upfront chemoradiotherapy with temozolomide, and then, adjuvant temozolomide. In the adjuvant phase, temozolomide is dosed at 150mg/m2 for days 1-5 of a 28 day cycle for the first cycle, and then increased to 200mg/m2 if tolerated. The previously reviewed common side effects of temozolomide can still be anticipated and were discussed as including, but not limited to, fatigue, nausea, and constipation. Any AST/ ALT elevations are typically reversible and any rash is manageable with antihistaminics and steroid premedication. Bone marrow suppression can result in leukopenia and thrombocytopenia.     Optune to be considered.    We also discussed the early use of bevacizumab (Avastin), but since she is grossly stable, will hold on starting this at this time.     PROBLEM LIST  Glioblastoma, MGMT unmethylated and IDH1 wildtype by NGS  Headaches  Seizure disorder  Depression    PLAN  -CANCER DIRECTED THERAPY-  Will initiate adjuvant temozolomide at 150mg/m2 (280mg), cycle 1 (start date of 9/2).   -If this dose is well tolerated, will increase to 200mg/m2 for cycle 2.  -Instructed to take temozolomide in the evening on an empty stomach, 30 minutes after Zofran dosing.  -Supportive medications; Zofran and bowel regimen.   -Repeat 28 day cycle if WBC >= 3, ANC >= 1.5, HgB >= 10, and platelets >= 100.    -Surveillance labs reviewed from last week, at goal for chemotherapy.   -Will continue every 2 week CBC and CMP every 4 weeks.  -Next generation sequencing can be ordered if further disease progression necessitates evaluating for targeted therapy.    -Post-radiation imaging with concerns for early disease progression. Repeat with perfusion in 4 weeks.    -STEROIDS-  -Continue dexamethasone 4mg.     -SEIZURE MANAGEMENT-  -History of seizures, requests anti-eplipetics.   -No driving for 3 months  (10/1/2020).  -Awaiting Vimpat approval.   -Did not tolerate Keprpa due to mood issues.     -Quality of life/ MOOD/ FATIGUE-  -Denies any mood issues.  -Continue to monitor mood as untreated/ undertreated depression can worsen fatigue, dysorexia, and quality of life.    -BRADYCARDIA-  -HR in the 40's.   -Continue to follow with cardiology.     Return to clinic on 9/28 with me + labs + imaging.     In the meantime, Africa knows to call with questions or concerns or to report new complaints and can be seen sooner if needed.    Shannen Hung MD  Neuro-oncology

## 2020-08-30 NOTE — PATIENT INSTRUCTIONS
Starting adjuvant-dosed chemotherapy with temozolomide  CYCLE 1 of 6  -5 consectutive nights (In a 28 day cycle; 5 days on treatment and 23 days off.)  -Dose: 280mg  -Start date: 9/2  -Take at bedtime on an empty stomach  -Take 30 minutes after Zofran dosing (can increase to 8mg if needed for control of nausea)  -Continue bowel regimen  -Continue to have lab drawn every 2 weeks (next due the week of 9/14).  -No antibiotics needed at this time    Imaging with changes that need to be watched closely.   Repeat imaging in 1 month + perfusion.    Awaiting Vimpat approval.     Continue dexamethasone 4mg daily.     Try sleeping with your head elevated.     Return to clinic in 4 weeks + imaging + labs.    Shannen Hung MD  Neuro-oncology  8/31/2020

## 2020-08-31 NOTE — ORAL ONC MGMT
"Oral Chemotherapy Monitoring Program    Primary Oncologist: Dr. Hung  Primary Oncology Clinic: [facility]   Cancer Diagnosis: Glioblastoma    Drug: Temodar 280mg once daily at bedtime for 5 days  Start Date: 9/2/20  Dose is appropriate for patients: Hepatic / Renal Function   Expected duration of therapy: Until disease progression or unacceptable toxicity    Drug Interaction Assessment: no new drug interactions    Lab Monitoring Plan  CBCwdiff every 2 weeks    Subjective/Objective:  Africa Dempsey is a 45 year old female contacted her  Jim by phone for an initial visit for oral chemotherapy education.      ORAL CHEMOTHERAPY 7/10/2020 7/22/2020   Drug Name Temodar (Temozolomide) Temodar (Temozolomide)   Current Dosage 140mg 140mg   Current Schedule Daily Daily   Cycle Details (No Data) (No Data)   Start Date of Last Cycle 7/15/2020 7/15/2020   Doses missed in last 2 weeks - 0   Adherence Assessment - Adherent   Adverse Effects - No AE identified during assessment   Any new drug interactions? No No   Is the dose as ordered appropriate for the patient? Yes Yes     Estimated body surface area is 1.78 meters squared as calculated from the following:    Height as of 10/28/19: 1.727 m (5' 8\").    Weight as of 8/5/20: 65.8 kg (145 lb).    Labs:  _  Result Component Current Result Ref Range   Sodium 140 (8/28/2020) 133 - 144 mmol/L     Result Component Current Result Ref Range   Potassium 3.9 (8/28/2020) 3.4 - 5.3 mmol/L     Result Component Current Result Ref Range   Calcium 10.2 (H) (8/28/2020) 8.5 - 10.1 mg/dL     Result Component Current Result Ref Range   Albumin 3.5 (8/28/2020) 3.4 - 5.0 g/dL     Result Component Current Result Ref Range   Urea Nitrogen 11 (8/28/2020) 7 - 30 mg/dL     Result Component Current Result Ref Range   Creatinine 0.84 (8/28/2020) 0.52 - 1.04 mg/dL     Result Component Current Result Ref Range   AST 9 (8/28/2020) 0 - 45 U/L     Result Component Current Result Ref Range "   ALT 19 (8/28/2020) 0 - 50 U/L     Result Component Current Result Ref Range   Bilirubin Total 0.3 (8/28/2020) 0.2 - 1.3 mg/dL     Result Component Current Result Ref Range   WBC 12.0 (H) (8/28/2020) 4.0 - 11.0 10e9/L     Result Component Current Result Ref Range   Hemoglobin 12.6 (8/28/2020) 11.7 - 15.7 g/dL     Result Component Current Result Ref Range   Platelet Count 211 (8/28/2020) 150 - 450 10e9/L     Result Component Current Result Ref Range   Absolute Neutrophil 10.3 (H) (8/28/2020) 1.6 - 8.3 10e9/L       Assessment:  Patient is appropriate to start therapy.    Plan:  Basic chemotherapy teaching was reviewed with the patient's  including indication, start date of therapy, dose, administration, adverse effects, missed doses, food and drug interactions, monitoring, side effect management, office contact information, and safe handling. Written materials were provided and all questions answered.       Abdiaziz Barraza, PharmD.  Oral Chemotherapy Monitoring Program  [phone]

## 2020-08-31 NOTE — TUMOR CONFERENCE
Tumor Conference Information  Tumor Conference:  Brain  Specialties Present:  Medical oncology, Pathology, Radiology, Radiation oncology, Surgery  Patient Status:  A current patient  Pathology:  Not Discussed  Treatment to Date:  Surgical intervention(s), Chemoradiation, Adjuvant chemotherapy  Clinical Trial Eligibility:  Not discussed  Recommended Plan:  Continue with current treatment plan (Comment: reviewed imaging - possible progression; plan to continue current treatment and reimage with follow up in 4 weeks)  Did the review exceed 30 minutes?:  did not           Documentation / Disclaimer Cancer Tumor Board Note  Cancer tumor board recommendations do not override what is determined to be reasonable care and treatment, which is dependent on the circumstances of a patient's case; the patient's medical, social, and personal concerns; and the clinical judgment of the oncologist [physician].     Will contact PMD for baseline. Pt reports vaginal spotting w/ intercourse and occasional rectal bleeding with wiping (known internal hemorrhoids) Pt reports she is up to date on pap and colonoscopy  - trend H/H  - bowel regimen  - o/p f/u with Gyn and GI

## 2020-08-31 NOTE — LETTER
"    8/31/2020         RE: Africa Dempsey  [id number]  [place] [facility] [id number]        Dear Colleague,    Thank you for referring your patient, Africa Dempsey, to the [facility]. Please see a copy of my visit note below.    Africa Dempsey is a 45 year old female who is being evaluated via a billable video visit.      The patient has been notified of following:     \"This video visit will be conducted via a call between you and your physician/provider. We have found that certain health care needs can be provided without the need for an in-person physical exam.  This service lets us provide the care you need with a video conversation.  If a prescription is necessary we can send it directly to your pharmacy.  If lab work is needed we can place an order for that and you can then stop by our lab to have the test done at a later time.    Video visits are billed at different rates depending on your insurance coverage.  Please reach out to your insurance provider with any questions.    If during the course of the call the physician/provider feels a video visit is not appropriate, you will not be charged for this service.\"    Patient has given verbal consent for Video visit? Yes  How would you like to obtain your AVS? MyChart  If you are dropped from the video visit, the video invite should be resent to: Send to e-mail at: [email or web address]  Will anyone else be joining your video visit? No  with patient does not need additional invitation.    Patient needs a refill of dexamethasone.    DANIEL Carpenter       Video-Visit Details  Type of service:  Video Visit    Video Start Time: 8:01 AM  Video End Time: 8:51 AM    Originating Location (pt. Location): Home    Distant Location (provider location):  [facility]     Platform used for Video Visit: Blanche Hung MD    __________________________________________________    NEURO-ONCOLOGY VISIT  Aug 31, " 2020    CHIEF COMPLAINT: Ms. Africa Dempsey is a 45 year old right-handed woman with a left frontal glioblastoma (IDH wildtype, MGMT promoter unmethylated), diagnosed following resection on 10/1/2019. Following initial diagnosis, Africa deferred standard upfront chemoradiotherapy in favor of alternative/ holistic options. Unfortunately, imaging in 7/2020 showed local as well as distant disease progression. She was deemed a non-surgical candidate. She completed chemoradiotherapy on 8/52020. The plan is now to initiate adjuvant dosed temozolomide.     I met with Africa and Jim () today for this follow-up visit.       HISTORY OF PRESENT ILLNESS  -Enjoyed break from treatment.   -Unfortunately on 8/25, experienced a seizure event in the setting of self-tapering dexamethasone. The event was described as altered consciences for 20 seconds. Was also off Keppra at the time. Never started Vimpat.  -On dexamethasone 4mg daily.  -Very fatigued. Generalized weakness is stable. No focal weakness. No falls. After a 15 minute walk, she is exhausted.   -Currently experiencing short (10 minutes), intermittent, mild headahces. Resolved with time.  -Mood is ok, very frustrated with the aphasia and dyslexia.   -Word-finding is worse. Still mentally foggy, short term memory issues.  -Slight dizziness with standing.   -Sleep is good.  -Denies any changes in sensation.  -Off supplements.     REVIEW OF SYSTEMS  A comprehensive ROS negative except as in HPI.      MEDICATIONS   Current Outpatient Medications   Medication Sig Dispense Refill     dexamethasone (DECADRON) 2 MG tablet Take 1 tablet (2 mg) by mouth daily (with breakfast) 30 tablet 1     Lacosamide (VIMPAT) 100 MG TABS tablet Take 0.5 tablets (50 mg) by mouth 2 times daily for 7 days, THEN 1 tablet (100 mg) 2 times daily for 21 days. Call for a refill. 49 tablet 0     ondansetron (ZOFRAN) 4 MG tablet Take 1 tablet (4 mg) by mouth At Bedtime 30 minutes prior to  chemotherapy dosing and repeat every 8 hours as needed for nausea. 30 tablet 3     temozolomide (TEMODAR) 140 MG capsule Take 2 capsules (280 mg) by mouth daily for 5 days Take ondansetron 30-60 min before temozolomide. Take at bedtime on an empty stomach. 10 capsule 0     DRUG ALLERGIES   Allergies   Allergen Reactions     Amoxicillin Hives       ONCOLOGIC HISTORY  -9/2019 PRESENTATION: Progressively worsening headaches over the past 3 weeks   -9/30/2019 MR brain imaging demonstrated a left frontal multi-lobed rim-enhancing mass lesion with associated edema.   -10/1/2019 SURGERY: Craniotomy for mass resection, gross total.   PATHOLOGY: Glioblastoma; IDH1-R132H wildtype/ IDH1, IHD2 wildtype by next generation sequencing. TP53 mutated.  BRAF, PTEN not mutated. MGMT promoter unmethylated.  -10/28/2019 NEURO-ONC: Recommending chemoradiotherapy, however, Africa is opting for alternative/ holistic options.   -7/1/2020 MRB with enhancement around the resection cavity, increased in size with extension into the corpus callosum. There are at least 3 new lesions in the left cerebral hemisphere, distant from the resection cavity.   -7/6/2020 NEURO-ONC: Clinically better on dexamethasone and Keppra. Wanting to initiate standard upfront treatment.   -7/16 - 8/5/2020 CHEMORADS: 40 Gy in 15 fractions with concurrent temozolomide.   -7/27/2020 NEURO-ONC: Continue chemoradiotherapy. Discussed Optune; waiting on this option.   -8/25/2020 SZ: Started Vimpat.   -8/31/2020 NEURO-ONC/ MRB/ CHEMO: Clinically overall stable. Started Vimpat, well tolerated. Dexamethasone 4mg daily. Imaging with concern for early disease progression. Initiating adjuvant temozolomide 150mg/m2 (280mg), cycle 1 to start on 9/2. Optune to be starting.     SOCIAL HISTORY   Tobacco use: Never smoker.   Alcohol use: Social.   , 2 children.   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      PHYSICAL EXAMINATION  -Generally well  appearing.  -Respiratory: No audible wheezing.   -Skin: No facial rashes.  -Psychiatric: Normal mood and affect. Pleasant, talkative.  -Neurologic:   MENTAL STATUS:     Alert, oriented to date but difficult with numbers.    Recall: Intact, but needing assistance with timing.    Speech is fragmented.    Worsening wording finding issues present.    Comprehension intact.     CRANIAL NERVES:     Pupils are equal, round, reactive to light.     Extraocular movements full, patient denies diplopia.     R facial droop, mild.   Hearing intact.   With normal phonation, no dysfunction of the palate or tongue.   MOTOR: Antigravity in arms. No pronation or drift.   SENSATION: Intact to light touch throughout.   COORDINATION: Intact bilaterally.     The rest of a comprehensive physical examination is deferred due to PHE (public health emergency) video visit restrictions.        MEDICAL RECORDS  Obtained and personally reviewed all available outside medical records in addition to reviewing any records available in our electronic system.     LABS  Personally reviewed all available lab results.     IMAGING  Personally reviewed MR brain imaging from last week and compared to pre-radiation imaging. To my eye, there is an increase in contrast enhancement and T2 FLAIR with midline shift.     Imaging was shown to and results were reviewed with Africa and Jim.     Imaging and case will be reviewed and discussed at Brain Tumor Conference.        IMPRESSION  Clinic was spent discussing in detail the nature of this tumor in light of her baseline post-radiation imaging. This was in addition to providing emotional support, answering questions pertaining to my recommendations, and devising the plan as outlined below.     Clinically, Africa is doing well. However, she did experience a breakthrough seizure event in the setting of self-tapering dexamethasone and while being off Keppra. Vimpat was never started, awaiting approval by insurance.  Current dexamethasone dose is 4mg. She has not had any more events concerning for seizures. She continues to have mild, self-resolving, intermittent headaches in the morning.    Imaging with concern for early disease progression. Will repeat in 1 month with perfusion.     With regard to cancer-directed therapy, a multimodal treatment approach first involves maximal surgical resection, followed by upfront chemoradiotherapy with temozolomide, and then, adjuvant temozolomide. In the adjuvant phase, temozolomide is dosed at 150mg/m2 for days 1-5 of a 28 day cycle for the first cycle, and then increased to 200mg/m2 if tolerated. The previously reviewed common side effects of temozolomide can still be anticipated and were discussed as including, but not limited to, fatigue, nausea, and constipation. Any AST/ ALT elevations are typically reversible and any rash is manageable with antihistaminics and steroid premedication. Bone marrow suppression can result in leukopenia and thrombocytopenia.     Optune to be considered.    We also discussed the early use of bevacizumab (Avastin), but since she is grossly stable, will hold on starting this at this time.     PROBLEM LIST  Glioblastoma, MGMT unmethylated and IDH1 wildtype by NGS  Headaches  Seizure disorder  Depression    PLAN  -CANCER DIRECTED THERAPY-  Will initiate adjuvant temozolomide at 150mg/m2 (280mg), cycle 1 (start date of 9/2).   -If this dose is well tolerated, will increase to 200mg/m2 for cycle 2.  -Instructed to take temozolomide in the evening on an empty stomach, 30 minutes after Zofran dosing.  -Supportive medications; Zofran and bowel regimen.   -Repeat 28 day cycle if WBC >= 3, ANC >= 1.5, HgB >= 10, and platelets >= 100.    -Surveillance labs reviewed from last week, at goal for chemotherapy.   -Will continue every 2 week CBC and CMP every 4 weeks.  -Next generation sequencing can be ordered if further disease progression necessitates evaluating for  targeted therapy.    -Post-radiation imaging with concerns for early disease progression. Repeat with perfusion in 4 weeks.    -STEROIDS-  -Continue dexamethasone 4mg.     -SEIZURE MANAGEMENT-  -History of seizures, requests anti-eplipetics.   -No driving for 3 months (10/1/2020).  -Awaiting Vimpat approval.   -Did not tolerate Keprpa due to mood issues.     -Quality of life/ MOOD/ FATIGUE-  -Denies any mood issues.  -Continue to monitor mood as untreated/ undertreated depression can worsen fatigue, dysorexia, and quality of life.    -BRADYCARDIA-  -HR in the 40's.   -Continue to follow with cardiology.     Return to clinic on 9/28 with me + labs + imaging.     In the meantime, Africa knows to call with questions or concerns or to report new complaints and can be seen sooner if needed.    Shannen Hung MD  Neuro-oncology

## 2020-09-08 NOTE — ORAL ONC MGMT
"Oral Chemotherapy Monitoring Program    Primary Oncologist: Abhilash  Primary Oncology Clinic: [facility]  Cancer Diagnosis: Glioblastoma    Therapy History:  Adjuvant dosed temozolomide    Drug Interaction Assessment: No New medications.    Lab Monitoring Plan  Weekly CBC, due 9/14  Subjective/Objective:  Africa Dempsey is a 45 year old female contacted by phone for a follow-up visit for oral chemotherapy.  I spoke with her  per chart documentation. He reports she is doing well, with the only possible side effect being fatigue, but he feels like that mis more likely due to her vimpat.    ORAL CHEMOTHERAPY 7/10/2020 7/22/2020 9/8/2020   Drug Name Temodar (Temozolomide) Temodar (Temozolomide) Temodar (Temozolomide)   Current Dosage 140mg 140mg 280mg   Current Schedule Daily Daily Daily   Cycle Details (No Data) (No Data) 5 days on, then 23 days off   Start Date of Last Cycle 7/15/2020 7/15/2020 -   Doses missed in last 2 weeks - 0 0   Adherence Assessment - Adherent Adherent   Adverse Effects - No AE identified during assessment No AE identified during assessment   Home BPs - - Not applicable   Any new drug interactions? No No No   Is the dose as ordered appropriate for the patient? Yes Yes Yes   Is the patient currently in pain? - - No   Has the patient missed any days of school, work, or other routine activity? - - No       Last PHQ-2 Score on record: No flowsheet data found.    Patient does not report depression symptoms.      Vitals:  BP:   BP Readings from Last 1 Encounters:   08/05/20 108/72     Wt Readings from Last 1 Encounters:   08/05/20 65.8 kg (145 lb)     Estimated body surface area is 1.78 meters squared as calculated from the following:    Height as of 10/28/19: 1.727 m (5' 8\").    Weight as of 8/5/20: 65.8 kg (145 lb).    Labs:  _  Result Component Current Result Ref Range   Sodium 140 (8/28/2020) 133 - 144 mmol/L     _  Result Component Current Result Ref Range   Potassium 3.9 (8/28/2020) 3.4 - 5.3 " mmol/L     _  Result Component Current Result Ref Range   Calcium 10.2 (H) (8/28/2020) 8.5 - 10.1 mg/dL     No results found for Mag within last 30 days.     No results found for Phos within last 30 days.     _  Result Component Current Result Ref Range   Albumin 3.5 (8/28/2020) 3.4 - 5.0 g/dL     _  Result Component Current Result Ref Range   Urea Nitrogen 11 (8/28/2020) 7 - 30 mg/dL     _  Result Component Current Result Ref Range   Creatinine 0.84 (8/28/2020) 0.52 - 1.04 mg/dL       _  Result Component Current Result Ref Range   AST 9 (8/28/2020) 0 - 45 U/L     _  Result Component Current Result Ref Range   ALT 19 (8/28/2020) 0 - 50 U/L     _  Result Component Current Result Ref Range   Bilirubin Total 0.3 (8/28/2020) 0.2 - 1.3 mg/dL       _  Result Component Current Result Ref Range   WBC 12.0 (H) (8/28/2020) 4.0 - 11.0 10e9/L     _  Result Component Current Result Ref Range   Hemoglobin 12.6 (8/28/2020) 11.7 - 15.7 g/dL     _  Result Component Current Result Ref Range   Platelet Count 211 (8/28/2020) 150 - 450 10e9/L     _  Result Component Current Result Ref Range   Absolute Neutrophil 10.3 (H) (8/28/2020) 1.6 - 8.3 10e9/L           .    Assessment:  Patient is tolerating adjuvant dose temozolomide well, with no significant issues.    Plan:  Continue current therap    Follow-Up:  Labs in one week, or sooner prn.    Refill Due:  9/28    Blayne Galeana, Pharm D.  Clinical Oncology Pharmacist- Oral Chemotherapy Monitoring Program  [phone]    September 8, 2020

## 2020-09-10 NOTE — NURSING NOTE
FOLLOW-UP VISIT    Patient Name: Africa Dempsey      : 1975     Age: 45 year old        ______________________________________________________________________________     Chief Complaint   Patient presents with     Radiation Therapy     phone follow up     There were no vitals taken for this visit.     Date Radiation Completed: 20    Pain  Denies pain today. But if she gets up too fast, too early, does not get enough sleep she can have bad headaches.    Meds  Current Med List Reviewed: Yes  Medication Note:     Labs  Other Labs: No    Imaging  MRI: upcoming on 10/10/20    Neuro:gait disturbance x 1 last week Wednesday - R leg loose/weakness, numbness or tingling R hand outter 3 fingers last , Hx headaches last night, this am FITZGERALD  Word finding difficulty - ongoing for past 4 wks. On steroids 4 mg, tried to decrease to 2 but had a seizure. Back on 4 mg and also on seizure meds  Skin: Warm  Dry  Intact  Energy Level: low - needs to keep a routine of breakfast in bed, then nap, then up about 9:30 am. Does go out for some walks. Does try to be involved with family activites    Other Appointments:     Date  Neuro Oncology: Dr. Hung   10/12/20   Primary:    Other:      Other Notes:

## 2020-09-10 NOTE — LETTER
9/10/2020         RE: Africa Dempsey  [address]  [facility] [address]        Dear Colleague,    Thank you for referring your patient, Africa Dempsey, to the [facility]. Please see a copy of my visit note below.       Department of Radiation Oncology  Radiation Therapy Center  [facility]  [place] [address]  [phone]       Radiation Oncology Follow-up Visit  September 10, 2020      Africa Dempsey  MRN: [id number]   : 1975     DIAGNOSIS: recurrent Glioblastoma multiforme status post resection without initial adjuvant therapy  PATHOLOGY:  Final pathology demonstrated glioblastoma multiforme, IDH 1 wild-type, MGMT promoter site unmethylated.                            STAGE: Grade IV  INTENT OF RADIOTHERAPY: definitive chemo-RT.   CONCURRENT SYSTEMIC THERAPY:  Yes, Temodar     ONCOLOGIC HISTORY:    Ms. Dempsey is a 45 year old female recurrent Glioblastoma multiforme (IDH-1 wild type, MGMT promotor site not hyper methylated)  status post resection without initial adjuvant therapy.      The patient's oncologic history dates to 2019 when she presented with progressively worsening headaches.  Imaging at that time including MRI of the brain demonstrated a left frontal enhancing mass concerning for primary brain tumor.  On 10/1/2019 the patient underwent gross total resection by Dr. Yo of the lesion.  Final pathology demonstrated glioblastoma multiforme, IDH 1 wild-type, MGMT promoter site unmethylated.  Adjuvant chemoradiation therapy following surgery was discussed with the patient, however patient declined.  She was followed with subsequent surveillance imaging and was without evidence of progressive disease until scans on 2020.  On 2020 the patient noticed headache and progressive word finding difficulty.  Imaging including brain MRI on 2020 demonstrated concern for multifocal areas of enhancement including the prior resection  cavity in the left frontal lobe as well as other potential sites of disease including an adjacent left frontal lobe lesion, left temporal lobe lesion, and T2 FLAIR abnormality involving the left hippocampus region.  Reported the patient underwent a syncopal episode and cardiac arrest leading to CPR at the outside hospital.  She was eventually transferred to [facility] for further management.  She is evaluated by cardiology who felt the edema could have possibly contributed to arrhythmia.  She was initiated on dopamine drip, eventually tapered off.  ECHO was negative. The patient's case was discussed and neuro-oncology tumor board recommendation was to proceed with definitive management chemoradiation therapy.  Patient met with Dr. Hung medical oncology who discussed concurrent and adjuvant Temodar.  Patient subsequently presented in radiation oncology clinic to discuss next steps in management regarding radiation therapy.        SITE OF TREATMENT: Partial brain     DATES  OF TREATMENT: 20-20     TOTAL DOSE OF TREATMENT: 4005 cGy/ 4005 cGy     DOSE PER FRACTION OF TREATMENT: 267 cGy x 15 fractions    INTERVAL SINCE COMPLETION OF RADIATION THERAPY:   1 month    SUBJECTIVE:   The patient returns for follow up.     In the interval, the patient underwent post treatment MRI  Brain on 20. Imaging demonstrated increase in enhancement and T2 FLAIR abnormality in treated areas concerning for possible tumor progression.     Clinically, the patient has had seizure event x1, headache, and increase in aphasia. Remains on decadron 4mg q day and anti-seizure medication. Continues on adjuvant TMZ under care of Dr. Hung.     Currently planned to undergo repeat MRI brain with perfusion on 10/9/20. Will see Dr. Hung on 10/12/20.    LABS AND IMAGIN20  MRI brain    IMPRESSION:  1. Increased size of the enhancing components of the heterogeneous  left frontal and left temporal lobe masses, with increased areas  of  associated restricted diffusion, consistent with interval progression  of glioblastoma.  2. Vasogenic edema in the left temporal lobe has increased. There is  worse mass effect and new rightward midline shift measuring 5 mm.    IMPRESSION:   Ms. Dempsey is a 45 year old female with recurrent Glioblastoma multiforme (IDH-1 wild type, MGMT promotor site not hyper methylated)  status post resection without initial adjuvant therapy. She completed definitive chemo-RT (40.05 Gy in 15 fractions, 8/5/20).    PLAN:   1. MRI  Brain on 8/28/20. Imaging demonstrated increase in enhancement and T2 FLAIR abnormality in treated areas concerning for possible tumor progression. The patient will undergo repeat MRI brain with perfusion on 10/9/20 to further evaluate.     2. Remains on steroid and anti-seizure medication per Dr. Hung's team. Continue on TMZ per medical oncology. Will see Dr. Hung on 10/12/20.    3. RTC in 1 month after scans.      Due to the concerns around COVID-19 and adhering to social distancing we conduct this visit over the telephone. Telephone call lasted 15 minutes.       Dhruv Palencia M.D.  Department of Radiation Oncology  [facility]

## 2020-09-10 NOTE — PROGRESS NOTES
Department of Radiation Oncology  Radiation Therapy Center  [facility] [place] [address]  [phone]       Radiation Oncology Follow-up Visit  September 10, 2020      Africa Dempsey  MRN: [id number]   : 1975     DIAGNOSIS: recurrent Glioblastoma multiforme status post resection without initial adjuvant therapy  PATHOLOGY:  Final pathology demonstrated glioblastoma multiforme, IDH 1 wild-type, MGMT promoter site unmethylated.                            STAGE: Grade IV  INTENT OF RADIOTHERAPY: definitive chemo-RT.   CONCURRENT SYSTEMIC THERAPY:  Yes, Temodar     ONCOLOGIC HISTORY:    Ms. Dempsey is a 45 year old female recurrent Glioblastoma multiforme (IDH-1 wild type, MGMT promotor site not hyper methylated)  status post resection without initial adjuvant therapy.      The patient's oncologic history dates to 2019 when she presented with progressively worsening headaches.  Imaging at that time including MRI of the brain demonstrated a left frontal enhancing mass concerning for primary brain tumor.  On 10/1/2019 the patient underwent gross total resection by Dr. Yo of the lesion.  Final pathology demonstrated glioblastoma multiforme, IDH 1 wild-type, MGMT promoter site unmethylated.  Adjuvant chemoradiation therapy following surgery was discussed with the patient, however patient declined.  She was followed with subsequent surveillance imaging and was without evidence of progressive disease until scans on 2020.  On 2020 the patient noticed headache and progressive word finding difficulty.  Imaging including brain MRI on 2020 demonstrated concern for multifocal areas of enhancement including the prior resection cavity in the left frontal lobe as well as other potential sites of disease including an adjacent left frontal lobe lesion, left temporal lobe lesion, and T2 FLAIR abnormality involving the left hippocampus region.  Reported the patient  underwent a syncopal episode and cardiac arrest leading to CPR at the outside hospital.  She was eventually transferred to [facility] for further management.  She is evaluated by cardiology who felt the edema could have possibly contributed to arrhythmia.  She was initiated on dopamine drip, eventually tapered off.  ECHO was negative. The patient's case was discussed and neuro-oncology tumor board recommendation was to proceed with definitive management chemoradiation therapy.  Patient met with Dr. Hung medical oncology who discussed concurrent and adjuvant Temodar.  Patient subsequently presented in radiation oncology clinic to discuss next steps in management regarding radiation therapy.        SITE OF TREATMENT: Partial brain     DATES  OF TREATMENT: 20-20     TOTAL DOSE OF TREATMENT: 4005 cGy/ 4005 cGy     DOSE PER FRACTION OF TREATMENT: 267 cGy x 15 fractions    INTERVAL SINCE COMPLETION OF RADIATION THERAPY:   1 month    SUBJECTIVE:   The patient returns for follow up.     In the interval, the patient underwent post treatment MRI  Brain on 20. Imaging demonstrated increase in enhancement and T2 FLAIR abnormality in treated areas concerning for possible tumor progression.     Clinically, the patient has had seizure event x1, headache, and increase in aphasia. Remains on decadron 4mg q day and anti-seizure medication. Continues on adjuvant TMZ under care of Dr. Hung.     Currently planned to undergo repeat MRI brain with perfusion on 10/9/20. Will see Dr. Hung on 10/12/20.    LABS AND IMAGIN20  MRI brain    IMPRESSION:  1. Increased size of the enhancing components of the heterogeneous  left frontal and left temporal lobe masses, with increased areas of  associated restricted diffusion, consistent with interval progression  of glioblastoma.  2. Vasogenic edema in the left temporal lobe has increased. There is  worse mass effect and new rightward midline shift measuring 5 mm.    IMPRESSION:   Ms.  Roselyn is a 45 year old female with recurrent Glioblastoma multiforme (IDH-1 wild type, MGMT promotor site not hyper methylated)  status post resection without initial adjuvant therapy. She completed definitive chemo-RT (40.05 Gy in 15 fractions, 8/5/20).    PLAN:   1. MRI  Brain on 8/28/20. Imaging demonstrated increase in enhancement and T2 FLAIR abnormality in treated areas concerning for possible tumor progression. The patient will undergo repeat MRI brain with perfusion on 10/9/20 to further evaluate.     2. Remains on steroid and anti-seizure medication per Dr. Hung's team. Continue on TMZ per medical oncology. Will see Dr. Hung on 10/12/20.    3. RTC in 1 month after scans.      Due to the concerns around COVID-19 and adhering to social distancing we conduct this visit over the telephone. Telephone call lasted 15 minutes.       Dhruv Palencia M.D.  Department of Radiation Oncology  [facility]

## 2020-09-17 NOTE — TELEPHONE ENCOUNTER
"Per Dr. Hung: upcoming visit 10/12 is too far out, her last OV note stated to see with labs and imaging 9/28. Will check with pt/spouse if any reason she can't do all on 9/28, she has labs scheduled at [place] that day. Recommend increase 2 mg dexa at lunch time. Monitor menstrual cycles, if she misses September too then may need to do more lab tests. Call back Monday to update team and headaches.    Relayed above information to Jim, he stated he was told MR couldn't be done \"with perfusion\" at [place] and he had requested scheduling at . He is fine with virtual visit 9/28 and MR/labs the week prior at . Informed him will message scheduling to contact him with new dates/times and he will call on Monday to update care team. Verbalized understanding of all instructions.  "

## 2020-09-17 NOTE — TELEPHONE ENCOUNTER
"Symptomatic mychart posted to original thread from 9/2: called spouse to discuss.    He stated HA pressure has increased, limiting activity, staying more in bed. Wonders if she can increase dexa dose, currently taking 4mg daily between 530-730 am. Spouse \"can tell\" dexa effects starts to work 90 min later on relieving headache and seems to wear off by late afternoon so she is unable to sleep due to pain. Night before last woke up 4 times to take Ibuprofen 200 mg each time. Unsure if really helpful but pt was able to fall asleep each time. Last night slept better and didn't take any Ibuprofen. This morning ok but after a short walk, headache was worse and took Ibuprofen 200 mg again.    Spouse has noticed that word finding is worse, (\"about 80% where she is using the wrong word\") Gradual, per spouse but some days is increased more than others. Also questioned whether due to Temodar or Dexa that pt has missed her August menstrual cycle, any concerns for future cycles? Paged Dr. Hung.  "

## 2020-09-22 NOTE — DISCHARGE INSTRUCTIONS
MRI Contrast Discharge Instructions    The IV contrast you received today will pass out of your body in your  urine. This will happen in the next 24 hours. You will not feel this process.  Your urine will not change color.    Drink at least 4 extra glasses of water or juice today (unless your doctor  has restricted your fluids). This reduces the stress on your kidneys.  You may take your regular medicines.    If you are on dialysis: It is best to have dialysis today.    If you have a reaction: Most reactions happen right away. If you have  any new symptoms after leaving the hospital (such as hives or swelling),  call your hospital at the correct number below. Or call your family doctor.  If you have breathing distress or wheezing, call 911.    Special instructions: ***    I have read and understand the above information.    Signature:______________________________________ Date:___________    Staff:__________________________________________ Date:___________     Time:__________    [place] [facility] Departments:    ___[place]: [phone]  ___[facility]: [phone]  ___[place]: [phone] ___[facility]: [phone]  ___[facility]: [phone]  ___[facility]: [phone]  ___[phone]  ___[facility]: [phone]  ___[phone]

## 2020-09-22 NOTE — LETTER
"    9/22/2020         RE: Africa Dempsey  [address]  [facility] [address]        Dear Colleague,    Thank you for referring your patient, Africa Dempsey, to the [facility]. Please see a copy of my visit note below.    Africa Dempsey is a 45 year old female who is being evaluated via a billable video visit.      The patient has been notified of following:     \"This video visit will be conducted via a call between you and your physician/provider. We have found that certain health care needs can be provided without the need for an in-person physical exam.  This service lets us provide the care you need with a video conversation.  If a prescription is necessary we can send it directly to your pharmacy.  If lab work is needed we can place an order for that and you can then stop by our lab to have the test done at a later time.    Video visits are billed at different rates depending on your insurance coverage.  Please reach out to your insurance provider with any questions.    If during the course of the call the physician/provider feels a video visit is not appropriate, you will not be charged for this service.\"    Patient has given verbal consent for Video visit? Yes  How would you like to obtain your AVS? MyChart  If you are dropped from the video visit, the video invite should be resent to: Send to e-mail at: [email or web address]  Will anyone else be joining your video visit? Yes: . How would they like to receive their invitation? Send to e-mail at: [email or web address]     Medication Refill DECADRON    Video-Visit Details  Type of service:  Video Visit    Video Start Time: 4:44 PM  Video End Time: 5:21 PM    Originating Location (pt. Location): Home    Distant Location (provider location):  [facility]     Platform used for Video Visit: Blanche Hung MD    __________________________________________________    NEURO-ONCOLOGY VISIT  Sep 22, 2020    CHIEF " COMPLAINT: Ms. Africa Dempsey is a 45 year old right-handed woman with a left frontal glioblastoma (IDH wildtype, MGMT promoter unmethylated), diagnosed following resection on 10/1/2019. Following initial diagnosis, Africa deferred standard upfront chemoradiotherapy in favor of alternative/ holistic options. Unfortunately, imaging in 7/2020 showed local as well as distant disease progression. She was deemed a non-surgical candidate.     Africa then completed chemoradiotherapy on 8/5/2020. She is currently managed on adjuvant dosed temozolomide, but has recently become more symptomatic and imaging from today demonstrated disease progression.     I met with Africa and Jim () today for this follow-up visit.     HISTORY OF PRESENT ILLNESS  -Cycle 1 of chemotherapy was tolerated well. Nausea is well controlled on supportive medications, denies issues with constipation on current bowel regimen.    -Unfortunately, for the past few days Africa has been experiencing more headaches. She was on dexamethasone 4mg in AM and 2mg at noon and her headaches were occurring throughout the night. Yesterday, the dexamethasone dose was increased to 4mg TID with much improvement in pain.   -Over the past month, she denies any recurrent seizures, however, she was having muscle twitching at night (now resolved with the increase in steroids). Tolerated the increased dose of Vimpat well.  -She is very fatigued with more generalized weakness.   -Mood is low.   -Infrequent episodes of numbness.   -More balance issues. Some dizziness on standing, especially in the morning. No weakness.   -Worsening issues with aphasia and dyslexia as well as cognitive/ memory, but improved with increase in dexamethasone.     REVIEW OF SYSTEMS  A comprehensive ROS negative except as in HPI.      MEDICATIONS   Current Outpatient Medications   Medication Sig Dispense Refill     dexamethasone (DECADRON) 2 MG tablet Take 2 tablets (4 mg) by mouth daily  (with breakfast) 60 tablet 1     Lacosamide (VIMPAT) 100 MG TABS tablet Take 0.5 tablets (50 mg) by mouth 2 times daily for 7 days, THEN 1 tablet (100 mg) 2 times daily for 21 days. Call for a refill. 49 tablet 0     ondansetron (ZOFRAN) 4 MG tablet Take 1 tablet (4 mg) by mouth At Bedtime 30 minutes prior to chemotherapy dosing and repeat every 8 hours as needed for nausea. 30 tablet 3     temozolomide (TEMODAR) 140 MG capsule Take 2 capsules (280 mg) by mouth daily for 5 days Take ondansetron 30-60 min before temozolomide. Take at bedtime on an empty stomach. 10 capsule 0     DRUG ALLERGIES   Allergies   Allergen Reactions     Amoxicillin Hives       ONCOLOGIC HISTORY  -9/2019 PRESENTATION: Progressively worsening headaches over the past 3 weeks   -9/30/2019 MR brain imaging demonstrated a left frontal multi-lobed rim-enhancing mass lesion with associated edema.   -10/1/2019 SURGERY: Craniotomy for mass resection, gross total.   PATHOLOGY: Glioblastoma; IDH1-R132H wildtype/ IDH1, IHD2 wildtype by next generation sequencing. TP53 mutated.  BRAF, PTEN not mutated. MGMT promoter unmethylated.  -10/28/2019 NEURO-ONC: Recommending chemoradiotherapy, however, Africa is opting for alternative/ holistic options.   -7/1/2020 MRB with enhancement around the resection cavity, increased in size with extension into the corpus callosum. There are at least 3 new lesions in the left cerebral hemisphere, distant from the resection cavity.   -7/6/2020 NEURO-ONC: Clinically better on dexamethasone and Keppra. Wanting to initiate standard upfront treatment.   -7/16 - 8/5/2020 CHEMORADS: 40 Gy in 15 fractions with concurrent temozolomide.   -7/27/2020 NEURO-ONC: Continue chemoradiotherapy. Discussed Optune; waiting on this option.   -8/25/2020 SZ: Started Vimpat.   -8/31/2020 NEURO-ONC/ MRB/ CHEMO: Clinically overall stable. Started Vimpat, well tolerated. Dexamethasone 4mg daily. Imaging with concern for early disease progression.  Initiating adjuvant temozolomide 150mg/m2 (280mg), cycle 1 to start on 9/2. Optune to be starting.   -9/22/2020 NEURO-ONC/ MRB/ CHEMO: Clinically worse with refractory headaches and fatigue. No seizures on Vimpat. Dexamethasone 4mg TID. Imaging with disease progression and increase perfusion. Stopping adjuvant temozolomide. Starting bevacizumab (Avastin) + lomustine.    SOCIAL HISTORY   Tobacco use: Never smoker.   Alcohol use: Social.   , 2 children.   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      PHYSICAL EXAMINATION  -Generally well appearing. Slightly fatigued. Depressed when learning of the imaging results.   -Respiratory: No audible wheezing.   -Skin: No facial rashes.  -Psychiatric: Normal mood and affect. Pleasant, talkative.  -Neurologic:   MENTAL STATUS:     Alert, oriented to date, but difficulty with numbers.    Recall: Intact, but needing assistance with timing.    Speech is fragmented.    Wording finding issues present.    Comprehension with mild impairments.     CRANIAL NERVES:     Pupils are equal, round, reactive to light.     Extraocular movements full, patient denies diplopia.     Right facial droop, mild.   Hearing intact.   With normal phonation, no dysfunction of the palate or tongue.   MOTOR: Antigravity in arms. No pronation or drift.   SENSATION: Intact to light touch throughout.   COORDINATION: Intact bilaterally.     The rest of a comprehensive physical examination is deferred due to PHE (public health emergency) video visit restrictions.        MEDICAL RECORDS  Obtained and personally reviewed all available outside medical records in addition to reviewing any records available in our electronic system.     LABS  Personally reviewed all available lab results.     IMAGING  Personally reviewed MR brain imaging from today and compared to prior imaging. To my eye, there is a further increase in contrast enhancement (1-3 millimeters) with a slight associated increase  in T2 FLAIR with midline shift. Perfusion is elevated.     Imaging was shown to and results were reviewed with Africa and Jim.       IMPRESSION  Clinic was spent discussing in detail the nature of her cancer in light of her repeat imaging. This was in addition to providing emotional support, answering questions pertaining to my recommendations, and devising the plan as outlined below.     Clinically, Africa was not doing well with worsening headache, fatigue, aphasia, and confusion prior to a significant increase in dexamethasone dosing to 12mg/ day. However, she has not had any breakthrough seizures over the past month and is tolerating the dose increase of Vimpat well. Unfortunately, imaging is consistent with disease progression with a slight increase in contrast enhancement and T2 FLAIR over the past month, but more notably is the increase in perfusion As a result, we discussed stopping adjuvant temozolomide and starting lomustine 110mg/m2 + bevacizumab (Avastin) 10mg/m2 q 2 weeks for improved symptom relief.    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 Stopping temozolomide, starting lomustine + bevacizumab (Avastin).   -Prior auth needed.   -Pharmacy and RN coordinator to call with teaching.   -Labs in 4 weeks and in 6 weeks.     -STEROIDS-  -Continue dexamethasone 4mg TID for now.  -Can taper once she starts Avastin.      -SEIZURE MANAGEMENT-  -History of seizures, requests anti-eplipetics.   -No driving for 3 months (10/1/2020).  -Tolerating Vimpat well at 100mg/m2 (refilled today).  -Did not tolerate Keppra.     -Quality of life/ MOOD/ FATIGUE-  -Denies any mood issues.  -Continue to monitor mood as untreated/ undertreated depression can worsen fatigue, dysorexia, and quality of life.    -BRADYCARDIA-  -HR in the 40's.   -Continue to follow with cardiology.      Return to clinic with me in 4 weeks.     In the meantime, Africa knows to call with questions or concerns or to report new complaints and can be seen sooner if needed.    Shannen Hung MD  Neuro-oncology    Again, thank you for allowing me to participate in the care of your patient.        Sincerely,        Shannen Hung MD

## 2020-09-22 NOTE — LETTER
"    9/22/2020         RE: Africa Dempsey  [address]  [facility] [address]        Dear Colleague,    Thank you for referring your patient, Africa Dempsey, to the [facility]. Please see a copy of my visit note below.    Africa Dempsey is a 45 year old female who is being evaluated via a billable video visit.      The patient has been notified of following:     \"This video visit will be conducted via a call between you and your physician/provider. We have found that certain health care needs can be provided without the need for an in-person physical exam.  This service lets us provide the care you need with a video conversation.  If a prescription is necessary we can send it directly to your pharmacy.  If lab work is needed we can place an order for that and you can then stop by our lab to have the test done at a later time.    Video visits are billed at different rates depending on your insurance coverage.  Please reach out to your insurance provider with any questions.    If during the course of the call the physician/provider feels a video visit is not appropriate, you will not be charged for this service.\"    Patient has given verbal consent for Video visit? Yes  How would you like to obtain your AVS? MyChart  If you are dropped from the video visit, the video invite should be resent to: Send to e-mail at: [email or web address]  Will anyone else be joining your video visit? Yes: . How would they like to receive their invitation? Send to e-mail at: [email or web address]     Medication Refill DECADRON    Video-Visit Details  Type of service:  Video Visit    Video Start Time: 4:44 PM  Video End Time: 5:21 PM    Originating Location (pt. Location): Home    Distant Location (provider location):  [facility]     Platform used for Video Visit: Blanche Hung MD    __________________________________________________    NEURO-ONCOLOGY VISIT  Sep 22, 2020    CHIEF " COMPLAINT: Ms. Africa Dempsey is a 45 year old right-handed woman with a left frontal glioblastoma (IDH wildtype, MGMT promoter unmethylated), diagnosed following resection on 10/1/2019. Following initial diagnosis, Africa deferred standard upfront chemoradiotherapy in favor of alternative/ holistic options. Unfortunately, imaging in 7/2020 showed local as well as distant disease progression. She was deemed a non-surgical candidate.     Africa then completed chemoradiotherapy on 8/5/2020. She is currently managed on adjuvant dosed temozolomide, but has recently become more symptomatic and imaging from today demonstrated disease progression.     I met with Africa and Jim () today for this follow-up visit.     HISTORY OF PRESENT ILLNESS  -Cycle 1 of chemotherapy was tolerated well. Nausea is well controlled on supportive medications, denies issues with constipation on current bowel regimen.    -Unfortunately, for the past few days Africa has been experiencing more headaches. She was on dexamethasone 4mg in AM and 2mg at noon and her headaches were occurring throughout the night. Yesterday, the dexamethasone dose was increased to 4mg TID with much improvement in pain.   -Over the past month, she denies any recurrent seizures, however, she was having muscle twitching at night (now resolved with the increase in steroids). Tolerated the increased dose of Vimpat well.  -She is very fatigued with more generalized weakness.   -Mood is low.   -Infrequent episodes of numbness.   -More balance issues. Some dizziness on standing, especially in the morning. No weakness.   -Worsening issues with aphasia and dyslexia as well as cognitive/ memory, but improved with increase in dexamethasone.     REVIEW OF SYSTEMS  A comprehensive ROS negative except as in HPI.      MEDICATIONS   Current Outpatient Medications   Medication Sig Dispense Refill     dexamethasone (DECADRON) 2 MG tablet Take 2 tablets (4 mg) by mouth daily  (with breakfast) 60 tablet 1     Lacosamide (VIMPAT) 100 MG TABS tablet Take 0.5 tablets (50 mg) by mouth 2 times daily for 7 days, THEN 1 tablet (100 mg) 2 times daily for 21 days. Call for a refill. 49 tablet 0     ondansetron (ZOFRAN) 4 MG tablet Take 1 tablet (4 mg) by mouth At Bedtime 30 minutes prior to chemotherapy dosing and repeat every 8 hours as needed for nausea. 30 tablet 3     temozolomide (TEMODAR) 140 MG capsule Take 2 capsules (280 mg) by mouth daily for 5 days Take ondansetron 30-60 min before temozolomide. Take at bedtime on an empty stomach. 10 capsule 0     DRUG ALLERGIES   Allergies   Allergen Reactions     Amoxicillin Hives       ONCOLOGIC HISTORY  -9/2019 PRESENTATION: Progressively worsening headaches over the past 3 weeks   -9/30/2019 MR brain imaging demonstrated a left frontal multi-lobed rim-enhancing mass lesion with associated edema.   -10/1/2019 SURGERY: Craniotomy for mass resection, gross total.   PATHOLOGY: Glioblastoma; IDH1-R132H wildtype/ IDH1, IHD2 wildtype by next generation sequencing. TP53 mutated.  BRAF, PTEN not mutated. MGMT promoter unmethylated.  -10/28/2019 NEURO-ONC: Recommending chemoradiotherapy, however, Africa is opting for alternative/ holistic options.   -7/1/2020 MRB with enhancement around the resection cavity, increased in size with extension into the corpus callosum. There are at least 3 new lesions in the left cerebral hemisphere, distant from the resection cavity.   -7/6/2020 NEURO-ONC: Clinically better on dexamethasone and Keppra. Wanting to initiate standard upfront treatment.   -7/16 - 8/5/2020 CHEMORADS: 40 Gy in 15 fractions with concurrent temozolomide.   -7/27/2020 NEURO-ONC: Continue chemoradiotherapy. Discussed Optune; waiting on this option.   -8/25/2020 SZ: Started Vimpat.   -8/31/2020 NEURO-ONC/ MRB/ CHEMO: Clinically overall stable. Started Vimpat, well tolerated. Dexamethasone 4mg daily. Imaging with concern for early disease progression.  Initiating adjuvant temozolomide 150mg/m2 (280mg), cycle 1 to start on 9/2. Optune to be starting.   -9/22/2020 NEURO-ONC/ MRB/ CHEMO: Clinically worse with refractory headaches and fatigue. No seizures on Vimpat. Dexamethasone 4mg TID. Imaging with disease progression and increase perfusion. Stopping adjuvant temozolomide. Starting bevacizumab (Avastin) + lomustine.    SOCIAL HISTORY   Tobacco use: Never smoker.   Alcohol use: Social.   , 2 children.   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      PHYSICAL EXAMINATION  -Generally well appearing. Slightly fatigued. Depressed when learning of the imaging results.   -Respiratory: No audible wheezing.   -Skin: No facial rashes.  -Psychiatric: Normal mood and affect. Pleasant, talkative.  -Neurologic:   MENTAL STATUS:     Alert, oriented to date, but difficulty with numbers.    Recall: Intact, but needing assistance with timing.    Speech is fragmented.    Wording finding issues present.    Comprehension with mild impairments.     CRANIAL NERVES:     Pupils are equal, round, reactive to light.     Extraocular movements full, patient denies diplopia.     Right facial droop, mild.   Hearing intact.   With normal phonation, no dysfunction of the palate or tongue.   MOTOR: Antigravity in arms. No pronation or drift.   SENSATION: Intact to light touch throughout.   COORDINATION: Intact bilaterally.     The rest of a comprehensive physical examination is deferred due to PHE (public health emergency) video visit restrictions.        MEDICAL RECORDS  Obtained and personally reviewed all available outside medical records in addition to reviewing any records available in our electronic system.     LABS  Personally reviewed all available lab results.     IMAGING  Personally reviewed MR brain imaging from today and compared to prior imaging. To my eye, there is a further increase in contrast enhancement (1-3 millimeters) with a slight associated increase  in T2 FLAIR with midline shift. Perfusion is elevated.     Imaging was shown to and results were reviewed with Africa and Jim.       IMPRESSION  Clinic was spent discussing in detail the nature of her cancer in light of her repeat imaging. This was in addition to providing emotional support, answering questions pertaining to my recommendations, and devising the plan as outlined below.     Clinically, Africa was not doing well with worsening headache, fatigue, aphasia, and confusion prior to a significant increase in dexamethasone dosing to 12mg/ day. However, she has not had any breakthrough seizures over the past month and is tolerating the dose increase of Vimpat well. Unfortunately, imaging is consistent with disease progression with a slight increase in contrast enhancement and T2 FLAIR over the past month, but more notably is the increase in perfusion As a result, we discussed stopping adjuvant temozolomide and starting lomustine 110mg/m2 + bevacizumab (Avastin) 10mg/m2 q 2 weeks for improved symptom relief.    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 Stopping temozolomide, starting lomustine + bevacizumab (Avastin).   -Prior auth needed.   -Pharmacy and RN coordinator to call with teaching.   -Labs in 4 weeks and in 6 weeks.     -STEROIDS-  -Continue dexamethasone 4mg TID for now.  -Can taper once she starts Avastin.      -SEIZURE MANAGEMENT-  -History of seizures, requests anti-eplipetics.   -No driving for 3 months (10/1/2020).  -Tolerating Vimpat well at 100mg/m2 (refilled today).  -Did not tolerate Keppra.     -Quality of life/ MOOD/ FATIGUE-  -Denies any mood issues.  -Continue to monitor mood as untreated/ undertreated depression can worsen fatigue, dysorexia, and quality of life.    -BRADYCARDIA-  -HR in the 40's.   -Continue to follow with cardiology.      Return to clinic with me in 4 weeks.     In the meantime, Africa knows to call with questions or concerns or to report new complaints and can be seen sooner if needed.    Shannen Hung MD  Neuro-oncology    Again, thank you for allowing me to participate in the care of your patient.        Sincerely,        Shannen Hung MD

## 2020-09-22 NOTE — PATIENT INSTRUCTIONS
Imaging with cancer growth.   Stopping temozolomide.  Starting lomustine + bevacizumab (Avastin).   Pharmacy and RN coordinator to call with teaching.   Labs in 4 weeks and in 6 weeks.     Continue dexamethasone 4mg TID for now.  Can taper once she starts Avastin.    Prescribed dexamethasone (4mg tabs).    Refilled Vimpat 100mg twice a day.     Return to clinic virtually in 4 weeks.     Shannen Hung MD  Neuro-oncology  9/22/2020

## 2020-09-22 NOTE — PROGRESS NOTES
"Africa Dempsey is a 45 year old female who is being evaluated via a billable video visit.      The patient has been notified of following:     \"This video visit will be conducted via a call between you and your physician/provider. We have found that certain health care needs can be provided without the need for an in-person physical exam.  This service lets us provide the care you need with a video conversation.  If a prescription is necessary we can send it directly to your pharmacy.  If lab work is needed we can place an order for that and you can then stop by our lab to have the test done at a later time.    Video visits are billed at different rates depending on your insurance coverage.  Please reach out to your insurance provider with any questions.    If during the course of the call the physician/provider feels a video visit is not appropriate, you will not be charged for this service.\"    Patient has given verbal consent for Video visit? Yes  How would you like to obtain your AVS? MyChart  If you are dropped from the video visit, the video invite should be resent to: Send to e-mail at: [email or web address]  Will anyone else be joining your video visit? Yes: . How would they like to receive their invitation? Send to e-mail at: [email or web address]     Medication Refill DECADRON    Video-Visit Details  Type of service:  Video Visit    Video Start Time: 4:44 PM  Video End Time: 5:21 PM    Originating Location (pt. Location): Home    Distant Location (provider location):  [facility]     Platform used for Video Visit: Blanche Hung MD    __________________________________________________    NEURO-ONCOLOGY VISIT  Sep 22, 2020    CHIEF COMPLAINT: Ms. Africa Dempsey is a 45 year old right-handed woman with a left frontal glioblastoma (IDH wildtype, MGMT promoter unmethylated), diagnosed following resection on 10/1/2019. Following initial diagnosis, Africa deferred " standard upfront chemoradiotherapy in favor of alternative/ holistic options. Unfortunately, imaging in 7/2020 showed local as well as distant disease progression. She was deemed a non-surgical candidate.     Africa then completed chemoradiotherapy on 8/5/2020. She is currently managed on adjuvant dosed temozolomide, but has recently become more symptomatic and imaging from today demonstrated disease progression.     I met with Africa and Jim () today for this follow-up visit.     HISTORY OF PRESENT ILLNESS  -Cycle 1 of chemotherapy was tolerated well. Nausea is well controlled on supportive medications, denies issues with constipation on current bowel regimen.    -Unfortunately, for the past few days Africa has been experiencing more headaches. She was on dexamethasone 4mg in AM and 2mg at noon and her headaches were occurring throughout the night. Yesterday, the dexamethasone dose was increased to 4mg TID with much improvement in pain.   -Over the past month, she denies any recurrent seizures, however, she was having muscle twitching at night (now resolved with the increase in steroids). Tolerated the increased dose of Vimpat well.  -She is very fatigued with more generalized weakness.   -Mood is low.   -Infrequent episodes of numbness.   -More balance issues. Some dizziness on standing, especially in the morning. No weakness.   -Worsening issues with aphasia and dyslexia as well as cognitive/ memory, but improved with increase in dexamethasone.     REVIEW OF SYSTEMS  A comprehensive ROS negative except as in HPI.      MEDICATIONS   Current Outpatient Medications   Medication Sig Dispense Refill     dexamethasone (DECADRON) 2 MG tablet Take 2 tablets (4 mg) by mouth daily (with breakfast) 60 tablet 1     Lacosamide (VIMPAT) 100 MG TABS tablet Take 0.5 tablets (50 mg) by mouth 2 times daily for 7 days, THEN 1 tablet (100 mg) 2 times daily for 21 days. Call for a refill. 49 tablet 0     ondansetron (ZOFRAN)  4 MG tablet Take 1 tablet (4 mg) by mouth At Bedtime 30 minutes prior to chemotherapy dosing and repeat every 8 hours as needed for nausea. 30 tablet 3     temozolomide (TEMODAR) 140 MG capsule Take 2 capsules (280 mg) by mouth daily for 5 days Take ondansetron 30-60 min before temozolomide. Take at bedtime on an empty stomach. 10 capsule 0     DRUG ALLERGIES   Allergies   Allergen Reactions     Amoxicillin Hives       ONCOLOGIC HISTORY  -9/2019 PRESENTATION: Progressively worsening headaches over the past 3 weeks   -9/30/2019 MR brain imaging demonstrated a left frontal multi-lobed rim-enhancing mass lesion with associated edema.   -10/1/2019 SURGERY: Craniotomy for mass resection, gross total.   PATHOLOGY: Glioblastoma; IDH1-R132H wildtype/ IDH1, IHD2 wildtype by next generation sequencing. TP53 mutated.  BRAF, PTEN not mutated. MGMT promoter unmethylated.  -10/28/2019 NEURO-ONC: Recommending chemoradiotherapy, however, Africa is opting for alternative/ holistic options.   -7/1/2020 MRB with enhancement around the resection cavity, increased in size with extension into the corpus callosum. There are at least 3 new lesions in the left cerebral hemisphere, distant from the resection cavity.   -7/6/2020 NEURO-ONC: Clinically better on dexamethasone and Keppra. Wanting to initiate standard upfront treatment.   -7/16 - 8/5/2020 CHEMORADS: 40 Gy in 15 fractions with concurrent temozolomide.   -7/27/2020 NEURO-ONC: Continue chemoradiotherapy. Discussed Optune; waiting on this option.   -8/25/2020 SZ: Started Vimpat.   -8/31/2020 NEURO-ONC/ MRB/ CHEMO: Clinically overall stable. Started Vimpat, well tolerated. Dexamethasone 4mg daily. Imaging with concern for early disease progression. Initiating adjuvant temozolomide 150mg/m2 (280mg), cycle 1 to start on 9/2. Optune to be starting.   -9/22/2020 NEURO-ONC/ MRB/ CHEMO: Clinically worse with refractory headaches and fatigue. No seizures on Vimpat. Dexamethasone 4mg TID.  Imaging with disease progression and increase perfusion. Stopping adjuvant temozolomide. Starting bevacizumab (Avastin) + lomustine.    SOCIAL HISTORY   Tobacco use: Never smoker.   Alcohol use: Social.   , 2 children.   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      PHYSICAL EXAMINATION  -Generally well appearing. Slightly fatigued. Depressed when learning of the imaging results.   -Respiratory: No audible wheezing.   -Skin: No facial rashes.  -Psychiatric: Normal mood and affect. Pleasant, talkative.  -Neurologic:   MENTAL STATUS:     Alert, oriented to date, but difficulty with numbers.    Recall: Intact, but needing assistance with timing.    Speech is fragmented.    Wording finding issues present.    Comprehension with mild impairments.     CRANIAL NERVES:     Pupils are equal, round, reactive to light.     Extraocular movements full, patient denies diplopia.     Right facial droop, mild.   Hearing intact.   With normal phonation, no dysfunction of the palate or tongue.   MOTOR: Antigravity in arms. No pronation or drift.   SENSATION: Intact to light touch throughout.   COORDINATION: Intact bilaterally.     The rest of a comprehensive physical examination is deferred due to PHE (public health emergency) video visit restrictions.        MEDICAL RECORDS  Obtained and personally reviewed all available outside medical records in addition to reviewing any records available in our electronic system.     LABS  Personally reviewed all available lab results.     IMAGING  Personally reviewed MR brain imaging from today and compared to prior imaging. To my eye, there is a further increase in contrast enhancement (1-3 millimeters) with a slight associated increase in T2 FLAIR with midline shift. Perfusion is elevated.     Imaging was shown to and results were reviewed with Andrea.       IMPRESSION  Clinic was spent discussing in detail the nature of her cancer in light of her repeat  imaging. This was in addition to providing emotional support, answering questions pertaining to my recommendations, and devising the plan as outlined below.     Clinically, Africa was not doing well with worsening headache, fatigue, aphasia, and confusion prior to a significant increase in dexamethasone dosing to 12mg/ day. However, she has not had any breakthrough seizures over the past month and is tolerating the dose increase of Vimpat well. Unfortunately, imaging is consistent with disease progression with a slight increase in contrast enhancement and T2 FLAIR over the past month, but more notably is the increase in perfusion As a result, we discussed stopping adjuvant temozolomide and starting lomustine 110mg/m2 + bevacizumab (Avastin) 10mg/m2 q 2 weeks for improved symptom relief.    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 Stopping temozolomide, starting lomustine + bevacizumab (Avastin).   -Prior auth needed.   -Pharmacy and RN coordinator to call with teaching.   -Labs in 4 weeks and in 6 weeks.     -STEROIDS-  -Continue dexamethasone 4mg TID for now.  -Can taper once she starts Avastin.      -SEIZURE MANAGEMENT-  -History of seizures, requests anti-eplipetics.   -No driving for 3 months (10/1/2020).  -Tolerating Vimpat well at 100mg/m2 (refilled today).  -Did not tolerate Keppra.     -Quality of life/ MOOD/ FATIGUE-  -Denies any mood issues.  -Continue to monitor mood as untreated/ undertreated depression can worsen fatigue, dysorexia, and quality of life.    -BRADYCARDIA-  -HR in the 40's.   -Continue to follow with cardiology.     Return to clinic with me in 4 weeks.     In the meantime, Africa knows to call with questions or concerns or to report new complaints and can be seen sooner if needed.    Shannen Hung MD  Neuro-oncology

## 2020-09-23 NOTE — TELEPHONE ENCOUNTER
Prior Authorization Approval    Authorization Effective Date: 9/23/2020  Authorization Expiration Date: 9/23/2021  Medication: Gleostine - APPROVED  Approved Dose/Quantity: 100/2  Reference #:     Insurance Company: [employer] - Phone [phone] Fax [phone]  Expected CoPay:       CoPay Card Available:      Foundation Assistance Needed:    Which Pharmacy is filling the prescription (Not needed for infusion/clinic administered):    Pharmacy Notified:    Patient Notified:          Luz Tsai CPhT  [facility]  Oncology Pharmacy Liaison  [email or web address]  Phone: [phone]  Fax: [phone]

## 2020-09-23 NOTE — TELEPHONE ENCOUNTER
PA Initiation    Medication: Gleostine - submitted  Insurance Company: [facility] - Phone [phone] Fax [phone]  Pharmacy Filling the Rx:    Filling Pharmacy Phone:    Filling Pharmacy Fax:    Start Date: 9/23/2020        Luz Tsai CPhT  [facility]  Oncology Pharmacy Liaison  [email or web address]  Phone: [phone]  Fax: [phone]

## 2020-09-24 NOTE — TELEPHONE ENCOUNTER
calling - received call today from pharmacy around 1530. Called pharmacy back and LM - didn't receive a call back. Reviewed chart - no notes in chart other than [employer] approval. Patient thinks they were likely calling to schedule next appointment. Will call back during clinic hours tomorrow to schedule.    Dr. Diego was to renew dexamethasone - confirmed prescription was sent on our end. Patient has enough through tomorrow - patient will call pharmacy in morning.     Madelin Ott RN on 9/23/2020 at 7:10 PM    Reason for Disposition    [1] Follow-up call from patient regarding patient's clinical status AND [2] information NON-URGENT    Additional Information    Negative: Lab calling with strep throat test results and triager can call in prescription    Negative: Lab calling with urinalysis test results and triager can call in prescription    Negative: Medication questions    Negative: ED call to PCP    Negative: Physician call to PCP    Negative: Call about patient who is currently hospitalized    Negative: Lab or radiology calling with CRITICAL test results    Negative: [1] Prescription not at pharmacy AND [2] was prescribed today by PCP    Negative: [1] Follow-up call from patient regarding patient's clinical status AND [2] information urgent    Negative: [1] Caller requests to speak ONLY to PCP AND [2] urgent question    Negative: [1] Caller requests to speak to PCP now AND [2] won't tell us reason for call  (Exception: if 10 pm to 6 am, caller must first discuss reason for the call)    Negative: Notification of hospital admission    Negative: Notification of death    Negative: Caller requesting lab results    Negative: Lab or radiology calling with test results    Protocols used: [facility] CALL [facility]

## 2020-09-24 NOTE — TELEPHONE ENCOUNTER
Oral Chemotherapy Monitoring Program    Primary Oncologist: Dr. Hung  Primary Oncology Clinic: [facility]   Cancer Diagnosis: Glioblastoma    Drug: Lomustine 200mg (2x 100mg capsule) by mouth daily for a single dose every 6 weeks  Start Date: ASAP  Dose is appropriate for patients: 1.82 BSA, Renal and Hepatic Function   Expected duration of therapy: Until disease progression or unacceptable toxicity    Drug Interaction Assessment: no clinically significant interactions identified.   Medication list checked (9/24): -Lomustine -DexAMETHasone (Systemic) -Lacosamide -Ondansetron    Lab Monitoring Plan  CBC every 2 weeks at infusion  CMP every 4 weeks at infusion    Subjective/Objective:  Africa Dempsey is a 45 year old female contacted by phone for an initial visit for oral chemotherapy education. I spoke to Africa's , Jim, today.     ORAL CHEMOTHERAPY 7/10/2020 7/22/2020 9/8/2020 9/24/2020   Drug Name Temodar (Temozolomide) Temodar (Temozolomide) Temodar (Temozolomide) (No Data)   Current Dosage 140mg 140mg 280mg 200mg   Current Schedule Daily Daily Daily Daily   Cycle Details (No Data) (No Data) 5 days on, then 23 days off Other   Start Date of Last Cycle 7/15/2020 7/15/2020 - -   Doses missed in last 2 weeks - 0 0 -   Adherence Assessment - Adherent Adherent -   Adverse Effects - No AE identified during assessment No AE identified during assessment -   Home BPs - - Not applicable -   Any new drug interactions? No No No No   Is the dose as ordered appropriate for the patient? Yes Yes Yes Yes   Is the patient currently in pain? - - No -   Has the patient missed any days of school, work, or other routine activity? - - No -       Last PHQ-2 Score on record: No flowsheet data found.    Vitals:  BP:   BP Readings from Last 1 Encounters:   08/05/20 108/72     Wt Readings from Last 1 Encounters:   09/22/20 70.3 kg (155 lb)     Estimated body surface area is 1.82 meters squared as calculated from the  "following:    Height as of 9/22/20: 1.702 m (5' 7\").    Weight as of 9/22/20: 70.3 kg (155 lb).      Labs:  _  Result Component Current Result Ref Range   Sodium 139 (9/22/2020) 133 - 144 mmol/L     _  Result Component Current Result Ref Range   Potassium 4.3 (9/22/2020) 3.4 - 5.3 mmol/L     _  Result Component Current Result Ref Range   Calcium 10.6 (H) (9/22/2020) 8.5 - 10.1 mg/dL     No results found for Mag within last 30 days.     No results found for Phos within last 30 days.     _  Result Component Current Result Ref Range   Albumin 3.9 (9/22/2020) 3.4 - 5.0 g/dL     _  Result Component Current Result Ref Range   Urea Nitrogen 21 (9/22/2020) 7 - 30 mg/dL     _  Result Component Current Result Ref Range   Creatinine 0.69 (9/22/2020) 0.52 - 1.04 mg/dL     _  Result Component Current Result Ref Range   AST 5 (9/22/2020) 0 - 45 U/L     _  Result Component Current Result Ref Range   ALT 29 (9/22/2020) 0 - 50 U/L     _  Result Component Current Result Ref Range   Bilirubin Total 0.3 (9/22/2020) 0.2 - 1.3 mg/dL     _  Result Component Current Result Ref Range   WBC 10.7 (9/22/2020) 4.0 - 11.0 10e9/L     _  Result Component Current Result Ref Range   Hemoglobin 13.6 (9/22/2020) 11.7 - 15.7 g/dL     _  Result Component Current Result Ref Range   Platelet Count 292 (9/22/2020) 150 - 450 10e9/L     _  Result Component Current Result Ref Range   Absolute Neutrophil 9.7 (H) (9/22/2020) 1.6 - 8.3 10e9/L       Assessment:  Patient is appropriate to start therapy.    Plan:  Basic chemotherapy teaching was reviewed with the patient's family including indication, start date of therapy, dose, administration, adverse effects, missed doses, food and drug interactions, monitoring, side effect management, office contact information, and safe handling. Written materials were provided and all questions answered.    Follow-Up:  1 week following the start of Lomustine therapy.        Ana Cabrales, Charan  Oral Chemotherapy " Monitoring Program  [facility]  [phone]

## 2020-09-28 NOTE — TUMOR CONFERENCE
Tumor Conference Information  Tumor Conference:  Brain  Specialties Present:  Medical oncology, Pathology, Radiology, Radiation oncology  Patient Status:  A current patient, For progression  Pathology:  Not Discussed  Treatment to Date:  Surgical intervention(s), Chemoradiation, Palliative chemotherapy  Clinical Trial Eligibility:  Not discussed  Recommended Plan:  Palliative chemotherapy (Comment: disease progression seen on recent MRI, start new line of therapy, repeat MRI and follow up in 6-8 weeks)  Did the review exceed 30 minutes?:  did not           Documentation / Disclaimer Cancer Tumor Board Note  Cancer tumor board recommendations do not override what is determined to be reasonable care and treatment, which is dependent on the circumstances of a patient's case; the patient's medical, social, and personal concerns; and the clinical judgment of the oncologist [physician].

## 2020-09-28 NOTE — PROGRESS NOTES
"Oncology RN Care Coordination Note:     This writer called patient's  Jim back per Sara Melgar, RNCC's request to let him know the answer to his question.  Last week he had asked Sara if there might be any concern of Africa getting her monthly cycle when she starts Avastin?  Per Dr. Hung: \"When the body is under stress and counts are low, it is common for women to not menstruate.  I do not see this as a potential occurrence. Continue to update us of any concerning issues.\"  I have left this information via a voicemail for Jim as he didn't answer when I called.     I advised he call the clinic @ [phone] option #5, #2 or he can send a [employer] message in with any further questions.    Lizzeth Galindo RN BSN   [facility]  Nurse Coordinator        "

## 2020-09-29 NOTE — PROGRESS NOTES
Infusion Nursing Note:  Africa GAURI Dempsey presents today for MVASI.    Patient seen by provider today: No   present during visit today: Not Applicable.    Note: N/A.    Intravenous Access:  Peripheral IV placed.    Treatment Conditions:  No urine test required for treatment. B/P within parameters.      Post Infusion Assessment:  MVASI initial dose infused over 90 minutes. Patient tolerated infusion without incident.  Blood return noted pre and post infusion.  Site patent and intact, free from redness, edema or discomfort.  No evidence of extravasations.  Access discontinued per protocol.       Discharge Plan:   Patient discharged in stable condition accompanied by: self.  Departure Mode: Ambulatory.    Maryanne Robles RN

## 2020-10-01 NOTE — PROGRESS NOTES
Spouse called in to triage stating pt restarted on pantoprazole after speaking with Dr. Hung. Requesting refill to [facility], rx sent.

## 2020-10-02 NOTE — ORAL ONC MGMT
Oral Chemotherapy Monitoring Program     Placed call to patient in follow up of lomustine therapy.     Left message to please call back in follow-up of therapy . No patient or drug names were mentioned.     Blayne Galeana Pharm D.  Clinical Oncology Pharmacist- Oral Chemotherapy Monitoring Program  [phone]    October 2, 2020

## 2020-10-05 NOTE — TELEPHONE ENCOUNTER
Spoke with patient's spouse, Jim. Informed of MD message. He inquires if the imaging exam can be completed in [place] at [facility].     Shannen Hung MD  You; Sara Melgar RN 31 minutes ago (10:36 AM)     Please arrange for a bilateral leg venous doppler U/S to rule out clot (DVT).   Closest [facility] location is [place] I believe. Please assist with scheduling ASAP.   Thanks,   Shannen Hung MD   Neuro-oncology   10/5/2020

## 2020-10-05 NOTE — TELEPHONE ENCOUNTER
Spoke with patient's spouse, Jim. Informed of MD message. He verbalizes understanding.     Shannen Hung MD Meagher, Ann K, RN; Sara Melgar, RN 14 minutes ago (11:49 AM)     Monitor closely then, but I continue to highly recommend a U/S to make sure there is no clot as DVT are exceeding common with glioblastoma and can be life threatening.   Shannen Hung MD   Neuro-oncology   10/5/2020

## 2020-10-05 NOTE — TELEPHONE ENCOUNTER
Pts spouse Jim calling to report that Lorenzo knee swelling is carrero noticeable and she denies pain. Would like to cancel the US and monitor. Will call back if worsening sx. Routed to Dr Hung and Sara Melgar, MARISSACC

## 2020-10-05 NOTE — TELEPHONE ENCOUNTER
Oral Chemotherapy Monitoring Program    Primary Oncologist: Dr Hung   Primary Oncology Clinic: [facility]  Cancer Diagnosis: Glioblastoma    Therapy History:  Lomustine 200mg ([id number]) PO once on 9/25/20.    Lab Monitoring Plan: as per Dr Hung: starting at week 4 of chemo (~10/23) then Q2week labs with Avastin appt.     Subjective/Objective:  Africa Dempsey is a 45 year old female contacted by phone for a follow-up visit for oral chemotherapy.  Spoke with her , Jmi.  He reports that she did not have nausea/vomiting after the Lomustine dose.  He does report some blurred vision and bloating, but this predated the Lomustine.  She also reports sore knees, and they have contacted Triage regarding this.  Since the dexamethasone increased, her cognitive abilities and word-finding difficulties have improved.      ORAL CHEMOTHERAPY 7/10/2020 7/22/2020 9/8/2020 9/24/2020 10/5/2020   Drug Name Temodar (Temozolomide) Temodar (Temozolomide) Temodar (Temozolomide) (No Data) (No Data)   Current Dosage 140mg 140mg 280mg 200mg 200mg   Current Schedule Daily Daily Daily Daily Daily   Cycle Details (No Data) (No Data) 5 days on, then 23 days off Other Other   Start Date of Last Cycle 7/15/2020 7/15/2020 - - 9/25/2020   Doses missed in last 2 weeks - 0 0 - 0   Adherence Assessment - Adherent Adherent - Adherent   Adverse Effects - No AE identified during assessment No AE identified during assessment - No AE identified during assessment   Home BPs - - Not applicable - -   Any new drug interactions? No No No No -   Is the dose as ordered appropriate for the patient? Yes Yes Yes Yes -   Is the patient currently in pain? - - No - -   Has the patient missed any days of school, work, or other routine activity? - - No - -       Vitals:  BP:   BP Readings from Last 1 Encounters:   09/29/20 120/75     Wt Readings from Last 1 Encounters:   09/22/20 70.3 kg (155 lb)     Estimated body surface area is 1.82 meters squared as  "calculated from the following:    Height as of 9/22/20: 1.702 m (5' 7\").    Weight as of 9/22/20: 70.3 kg (155 lb).    Labs:  _  Result Component Current Result Ref Range   Sodium 139 (9/22/2020) 133 - 144 mmol/L     _  Result Component Current Result Ref Range   Potassium 4.3 (9/22/2020) 3.4 - 5.3 mmol/L     _  Result Component Current Result Ref Range   Calcium 10.6 (H) (9/22/2020) 8.5 - 10.1 mg/dL     No results found for Mag within last 30 days.     No results found for Phos within last 30 days.     _  Result Component Current Result Ref Range   Albumin 3.9 (9/22/2020) 3.4 - 5.0 g/dL     _  Result Component Current Result Ref Range   Urea Nitrogen 21 (9/22/2020) 7 - 30 mg/dL     _  Result Component Current Result Ref Range   Creatinine 0.69 (9/22/2020) 0.52 - 1.04 mg/dL       _  Result Component Current Result Ref Range   AST 5 (9/22/2020) 0 - 45 U/L     _  Result Component Current Result Ref Range   ALT 29 (9/22/2020) 0 - 50 U/L     _  Result Component Current Result Ref Range   Bilirubin Total 0.3 (9/22/2020) 0.2 - 1.3 mg/dL       _  Result Component Current Result Ref Range   WBC 10.7 (9/22/2020) 4.0 - 11.0 10e9/L     _  Result Component Current Result Ref Range   Hemoglobin 13.6 (9/22/2020) 11.7 - 15.7 g/dL     _  Result Component Current Result Ref Range   Platelet Count 292 (9/22/2020) 150 - 450 10e9/L     _  Result Component Current Result Ref Range   Absolute Neutrophil 9.7 (H) (9/22/2020) 1.6 - 8.3 10e9/L       Assessment:  Patient has tolerated the first dose of Lomustine well.    Plan:  No change at present    Follow-Up:  Next labs scheduled 10/13  Will be seen in clinic on 10/20 by Dr Hung.    Fay Gilliam, PharmD, BCPS, BCOP  Oncology Clinical Pharmacy Specialist  [facility]  [phone]  "

## 2020-10-13 NOTE — NURSING NOTE
"Africa Dempsey is a 45 year old female who is being evaluated via a billable telephone visit.      The patient has been notified of following:     \"This telephone visit will be conducted via a call between you and your physician/provider. We have found that certain health care needs can be provided without the need for a physical exam.  This service lets us provide the care you need with a short phone conversation.  If a prescription is necessary we can send it directly to your pharmacy.  If lab work is needed we can place an order for that and you can then stop by our lab to have the test done at a later time.    Telephone visits are billed at different rates depending on your insurance coverage. During this emergency period, for some insurers they may be billed the same as an in-person visit.  Please reach out to your insurance provider with any questions.    If during the course of the call the physician/provider feels a telephone visit is not appropriate, you will not be charged for this service.\"    Patient has given verbal consent for Telephone visit?  Yes    What phone number would you like to be contacted at? cell    How would you like to obtain your AVS? Anthonyhart    Phone call duration: 2 minutes RN time    Soo Mark RN    Radiation to partial brain complete on 8/5/20    RN called pt to verify they were ready for MD phone call.  Brief med list review - pt had just gotten infusion in cancer clinic today.  Pt reports she had an abdominal xray due to ongoing bloating - wondering results - ordered by neuro-onc, Dr. Hung  Pt and spouse also stated plts are low and wanted to discuss    RN - will have MD review neuro symptoms and post radiation changes        "

## 2020-10-13 NOTE — PROGRESS NOTES
Infusion Nursing Note:  Africa Dempsey presents today for C1d15 MVASI.    Patient seen by provider today: No   present during visit today: Not Applicable.    Note: N/A.    Intravenous Access:  Peripheral IV placed.    Treatment Conditions:  Lab Results   Component Value Date    HGB 13.8 10/13/2020     Lab Results   Component Value Date    WBC 9.9 10/13/2020      Lab Results   Component Value Date    ANEU 8.9 10/13/2020     Lab Results   Component Value Date     10/13/2020      Lab Results   Component Value Date     09/22/2020                   Lab Results   Component Value Date    POTASSIUM 4.3 09/22/2020           Lab Results   Component Value Date    MAG 2.2 07/03/2020            Lab Results   Component Value Date    CR 0.69 09/22/2020                   Lab Results   Component Value Date    HANH 10.6 09/22/2020                Lab Results   Component Value Date    BILITOTAL 0.3 09/22/2020           Lab Results   Component Value Date    ALBUMIN 3.9 09/22/2020                    Lab Results   Component Value Date    ALT 29 09/22/2020           Lab Results   Component Value Date    AST 5 09/22/2020       Results reviewed, labs MET treatment parameters, ok to proceed with treatment.      Post Infusion Assessment:  Patient tolerated infusion without incident.  Blood return noted pre and post infusion.  Site patent and intact, free from redness, edema or discomfort.  No evidence of extravasations.  Access discontinued per protocol.       Discharge Plan:   Discharge instructions reviewed with: Patient.  Patient and/or family verbalized understanding of discharge instructions and all questions answered.  Copy of AVS reviewed with patient and/or family.  Patient will return 10/20/20 for next appointment.  Patient discharged in stable condition accompanied by: .  Departure Mode: Ambulatory.    Raimundo Darling RN

## 2020-10-13 NOTE — ORAL ONC MGMT
Oral Chemotherapy Monitoring Program    Placed call to patient in follow up of labs.    Left message to please call back in follow up of labs. No patient or drug names were mentioned.    Rai Kramer  Pharmacy Intern  Oral Chemotherapy Monitoring Program  [facility]  [phone]

## 2020-10-13 NOTE — LETTER
10/13/2020         RE: Africa Dempsey  [address]  [place] [address]        Dear Colleague,    Thank you for referring your patient, Africa Dempsey, to the RADIATION THERAPY CENTER. Please see a copy of my visit note below.       Department of Radiation Oncology  Radiation Therapy Center  [facility]  [place] [address]  [phone]       Radiation Oncology Follow-up Visit  2020      Africa Dempsey  MRN: [id number]   : 1975     DIAGNOSIS: recurrent Glioblastoma multiforme status post resection without initial adjuvant therapy  PATHOLOGY:  Final pathology demonstrated glioblastoma multiforme, IDH 1 wild-type, MGMT promoter site unmethylated.                            STAGE: Grade IV  INTENT OF RADIOTHERAPY: definitive chemo-RT.   CONCURRENT SYSTEMIC THERAPY:  Yes, Temodar     ONCOLOGIC HISTORY:    Ms. Dempsey is a 45 year old female recurrent Glioblastoma multiforme (IDH-1 wild type, MGMT promotor site not hyper methylated)  status post resection without initial adjuvant therapy.      The patient's oncologic history dates to 2019 when she presented with progressively worsening headaches.  Imaging at that time including MRI of the brain demonstrated a left frontal enhancing mass concerning for primary brain tumor.  On 10/1/2019 the patient underwent gross total resection by Dr. Yo of the lesion.  Final pathology demonstrated glioblastoma multiforme, IDH 1 wild-type, MGMT promoter site unmethylated.  Adjuvant chemoradiation therapy following surgery was discussed with the patient, however patient declined.  She was followed with subsequent surveillance imaging and was without evidence of progressive disease until scans on 2020.  On 2020 the patient noticed headache and progressive word finding difficulty.  Imaging including brain MRI on 2020 demonstrated concern for multifocal areas of enhancement including the prior resection  cavity in the left frontal lobe as well as other potential sites of disease including an adjacent left frontal lobe lesion, left temporal lobe lesion, and T2 FLAIR abnormality involving the left hippocampus region.  Reported the patient underwent a syncopal episode and cardiac arrest leading to CPR at the outside hospital.  She was eventually transferred to [facility] for further management.  She is evaluated by cardiology who felt the edema could have possibly contributed to arrhythmia.  She was initiated on dopamine drip, eventually tapered off.  ECHO was negative. The patient's case was discussed and neuro-oncology tumor board recommendation was to proceed with definitive management chemoradiation therapy.  Patient met with Dr. Hung medical oncology who discussed concurrent and adjuvant Temodar.  Patient subsequently presented in radiation oncology clinic to discuss next steps in management regarding radiation therapy.        SITE OF TREATMENT: Partial brain     DATES  OF TREATMENT: 20-20     TOTAL DOSE OF TREATMENT: 4005 cGy/ 4005 cGy     DOSE PER FRACTION OF TREATMENT: 267 cGy x 15 fractions    INTERVAL SINCE COMPLETION OF RADIATION THERAPY:   2 months    SUBJECTIVE:   The patient returns for follow up.     The patient underwent post treatment MRI  Brain on 20. Imaging demonstrated increase in enhancement and T2 FLAIR abnormality in treated areas concerning for possible tumor progression. Follow up  MRI perfusion scan on 20 demonstrated multiple enhancing lesions all with elevated rCBV consistent with tumor progression.     Due to POD, systemic treatment was changed to Avastin and lomustine under care of Dr. Hung. She remains on decadron, currently reportedly on 12mg daily.  has noticed some improvement in cognition and aphasia since treatment was changed.       LABS AND IMAGIN20  MRI brain    IMPRESSION:  1. Increased size of the enhancing components of the heterogeneous  left  frontal and left temporal lobe masses, with increased areas of  associated restricted diffusion, consistent with interval progression  of glioblastoma.  2. Vasogenic edema in the left temporal lobe has increased. There is  worse mass effect and new rightward midline shift measuring 5 mm.      9/22/20  Findings:  Postoperative changes of left frontal craniotomy. 5.8 x 2.6 x 4.0 cm  heterogenous enhancing mass in the anterior left frontal lobe with  surrounding vasogenic edema grossly stable in size when compared to CT  dated 8/28/2020. However, demonstrates more necrosis compared to prior  study. This mass exhibits intense areas of diffusion restriction at  the anterior inferior paramedian margin, also largely unchanged from  prior. There is associated increased rCBV on perfusion.     Additionally, there is a 4.3 x 3.4 x 3.1 cm separate, rim-enhancing  mass in the left temporal lobe with surrounding vasogenic edema,  stable when compared to prior exam when accounting for differences in  technique. The lesion also has diffusion restriction, most pronounced  in the periphery of the lesion. Areas of minimal susceptibility  artifact along the periphery of this mass which may indicate  intratumoral hemorrhage. Increased rCBV on perfusion.     Several additional satellite lesions, most representative as described  below: 1.6 x 1.7 cm enhancing lesion in the left frontal lobe (series  23, image 21), stable from prior, also demonstrates elevated rCBV on  perfusion. Previously described 9 mm nodule in the superior aspect of  the left frontal lobe is now confluent with the dominant left frontal  mass.     There is 5 mm of left-to-right midline shift, with minimal compression  on the left lateral ventricle, similar to prior.     No significant change in T2/FLAIR hyperintense signal surrounding the  dominant masses/nodules that likely represent vasogenic edema and  nonenhancing infiltrative tumor, extending over the genu of the  corpus  callosum, the paramedian right frontal lobe, in the left basal  ganglia.     Normal major vascular intracranial flow-voids.      The visualized portions of paranasal sinuses, and mastoid air cells  are relatively clear. The orbits are grossly unremarkable.                                                                      Impression:   In this patient with a history of glioblastoma:      1. Enhancing masses as described above with surrounding T2 FLAIR  hyperintensity stable in size compared to the prior MRI. The only  difference is the left frontal lesion which demonstrates more central  necrosis. All lesions demonstrate elevated rCBV on perfusion.  2. Unchanged mass effect with left to right midline shift measuring 5  mm.       IMPRESSION:   Ms. Dempsey is a 45 year old female with recurrent Glioblastoma multiforme (IDH-1 wild type, MGMT promotor site not hyper methylated)  status post resection without initial adjuvant therapy. She completed definitive chemo-RT (40.05 Gy in 15 fractions, 8/5/20).    PLAN:   1. MRI  Brain on 8/28/20. Imaging demonstrated increase in enhancement and T2 FLAIR abnormality in treated areas concerning for possible tumor progression.  Follow up  MRI perfusion scan on 9/22/20 demonstrated multiple enhancing lesions all with elevated rCBV consistent with tumor progression.     2. Due to POD, systemic treatment was changed to Avastin and lomustine under care of Dr. Hung. She remains on decadron. The patient will continue to follow up with Dr. Hung's team at this time and continue systemic therapy.     3. RTC as needed.       Due to the concerns around COVID-19 and adhering to social distancing we conduct this visit over the telephone. Telephone call lasted 15 minutes.       Dhruv Palencia M.D.  Department of Radiation Oncology  [facility]

## 2020-10-13 NOTE — PROGRESS NOTES
RN Care Coordination Note  Patient had abdominal x-ray because of bloating and discomfort. X-ray showed large amount of stool throughout the colon consistent with constipation.  Provider Response:  Needs an enema. Plus an increase in the bowel regimen;   Suggested bowel regimen;   -Docusate 100 mg; 3 capsule(s) orally day (stool softener for constipation)   -Senna 8.6 m tab(s) orally at bedtime (laxative as needed for constipation)   -MiraLax 1 package 2 times a day   A goal of one, soft bowel movement per day.   Thanks,   Shannen Hung MD   Neuro-oncology   10/13/2020   Outgoing Call:   Called patient, relayed all above information. Patient stated she did not want the enema but would try the rest of the recommended bowel regimen.    Sara Melgar RN, BSN  Care Coordinator   [facility]

## 2020-10-13 NOTE — PROGRESS NOTES
Department of Radiation Oncology  Radiation Therapy Center  [facility] [place] [address]  [phone]       Radiation Oncology Follow-up Visit  2020      Africa Dempsey  MRN: [id number]   : 1975     DIAGNOSIS: recurrent Glioblastoma multiforme status post resection without initial adjuvant therapy  PATHOLOGY:  Final pathology demonstrated glioblastoma multiforme, IDH 1 wild-type, MGMT promoter site unmethylated.                            STAGE: Grade IV  INTENT OF RADIOTHERAPY: definitive chemo-RT.   CONCURRENT SYSTEMIC THERAPY:  Yes, Temodar     ONCOLOGIC HISTORY:    Ms. Dempsey is a 45 year old female recurrent Glioblastoma multiforme (IDH-1 wild type, MGMT promotor site not hyper methylated)  status post resection without initial adjuvant therapy.      The patient's oncologic history dates to 2019 when she presented with progressively worsening headaches.  Imaging at that time including MRI of the brain demonstrated a left frontal enhancing mass concerning for primary brain tumor.  On 10/1/2019 the patient underwent gross total resection by Dr. Yo of the lesion.  Final pathology demonstrated glioblastoma multiforme, IDH 1 wild-type, MGMT promoter site unmethylated.  Adjuvant chemoradiation therapy following surgery was discussed with the patient, however patient declined.  She was followed with subsequent surveillance imaging and was without evidence of progressive disease until scans on 2020.  On 2020 the patient noticed headache and progressive word finding difficulty.  Imaging including brain MRI on 2020 demonstrated concern for multifocal areas of enhancement including the prior resection cavity in the left frontal lobe as well as other potential sites of disease including an adjacent left frontal lobe lesion, left temporal lobe lesion, and T2 FLAIR abnormality involving the left hippocampus region.  Reported the patient underwent  a syncopal episode and cardiac arrest leading to CPR at the outside hospital.  She was eventually transferred to [facility] for further management.  She is evaluated by cardiology who felt the edema could have possibly contributed to arrhythmia.  She was initiated on dopamine drip, eventually tapered off.  ECHO was negative. The patient's case was discussed and neuro-oncology tumor board recommendation was to proceed with definitive management chemoradiation therapy.  Patient met with Dr. Hung medical oncology who discussed concurrent and adjuvant Temodar.  Patient subsequently presented in radiation oncology clinic to discuss next steps in management regarding radiation therapy.        SITE OF TREATMENT: Partial brain     DATES  OF TREATMENT: 20-20     TOTAL DOSE OF TREATMENT: 4005 cGy/ 4005 cGy     DOSE PER FRACTION OF TREATMENT: 267 cGy x 15 fractions    INTERVAL SINCE COMPLETION OF RADIATION THERAPY:   2 months    SUBJECTIVE:   The patient returns for follow up.     The patient underwent post treatment MRI  Brain on 20. Imaging demonstrated increase in enhancement and T2 FLAIR abnormality in treated areas concerning for possible tumor progression. Follow up  MRI perfusion scan on 20 demonstrated multiple enhancing lesions all with elevated rCBV consistent with tumor progression.     Due to POD, systemic treatment was changed to Avastin and lomustine under care of Dr. Hung. She remains on decadron, currently reportedly on 12mg daily.  has noticed some improvement in cognition and aphasia since treatment was changed.       LABS AND IMAGIN20  MRI brain    IMPRESSION:  1. Increased size of the enhancing components of the heterogeneous  left frontal and left temporal lobe masses, with increased areas of  associated restricted diffusion, consistent with interval progression  of glioblastoma.  2. Vasogenic edema in the left temporal lobe has increased. There is  worse mass effect and new  rightward midline shift measuring 5 mm.      9/22/20  Findings:  Postoperative changes of left frontal craniotomy. 5.8 x 2.6 x 4.0 cm  heterogenous enhancing mass in the anterior left frontal lobe with  surrounding vasogenic edema grossly stable in size when compared to CT  dated 8/28/2020. However, demonstrates more necrosis compared to prior  study. This mass exhibits intense areas of diffusion restriction at  the anterior inferior paramedian margin, also largely unchanged from  prior. There is associated increased rCBV on perfusion.     Additionally, there is a 4.3 x 3.4 x 3.1 cm separate, rim-enhancing  mass in the left temporal lobe with surrounding vasogenic edema,  stable when compared to prior exam when accounting for differences in  technique. The lesion also has diffusion restriction, most pronounced  in the periphery of the lesion. Areas of minimal susceptibility  artifact along the periphery of this mass which may indicate  intratumoral hemorrhage. Increased rCBV on perfusion.     Several additional satellite lesions, most representative as described  below: 1.6 x 1.7 cm enhancing lesion in the left frontal lobe (series  23, image 21), stable from prior, also demonstrates elevated rCBV on  perfusion. Previously described 9 mm nodule in the superior aspect of  the left frontal lobe is now confluent with the dominant left frontal  mass.     There is 5 mm of left-to-right midline shift, with minimal compression  on the left lateral ventricle, similar to prior.     No significant change in T2/FLAIR hyperintense signal surrounding the  dominant masses/nodules that likely represent vasogenic edema and  nonenhancing infiltrative tumor, extending over the genu of the corpus  callosum, the paramedian right frontal lobe, in the left basal  ganglia.     Normal major vascular intracranial flow-voids.      The visualized portions of paranasal sinuses, and mastoid air cells  are relatively clear. The orbits are  grossly unremarkable.                                                                      Impression:   In this patient with a history of glioblastoma:      1. Enhancing masses as described above with surrounding T2 FLAIR  hyperintensity stable in size compared to the prior MRI. The only  difference is the left frontal lesion which demonstrates more central  necrosis. All lesions demonstrate elevated rCBV on perfusion.  2. Unchanged mass effect with left to right midline shift measuring 5  mm.       IMPRESSION:   Ms. Dempsey is a 45 year old female with recurrent Glioblastoma multiforme (IDH-1 wild type, MGMT promotor site not hyper methylated)  status post resection without initial adjuvant therapy. She completed definitive chemo-RT (40.05 Gy in 15 fractions, 8/5/20).    PLAN:   1. MRI  Brain on 8/28/20. Imaging demonstrated increase in enhancement and T2 FLAIR abnormality in treated areas concerning for possible tumor progression.  Follow up  MRI perfusion scan on 9/22/20 demonstrated multiple enhancing lesions all with elevated rCBV consistent with tumor progression.     2. Due to POD, systemic treatment was changed to Avastin and lomustine under care of Dr. Hung. She remains on decadron. The patient will continue to follow up with Dr. Hung's team at this time and continue systemic therapy.     3. RTC as needed.       Due to the concerns around COVID-19 and adhering to social distancing we conduct this visit over the telephone. Telephone call lasted 15 minutes.       Dhruv Palencia M.D.  Department of Radiation Oncology  [facility]

## 2020-10-19 NOTE — PATIENT INSTRUCTIONS
Lomustine; Cycle 2 due on 11/20 (8 week interval).   Will continue to watch lab results.   Will repeat imaging at that time.     Continue nani every 2 weeks.   Critical to avoid constipation.     Dexamethasone taper;    -8mg total daily for 1 week   -6mg daily for 1 week    -4mg daily to continue   Continue using magic mouth wash; oral thrush will improve with tapering steroids.   Sleep to improve with less steroids.     U/S of both legs to rule out a clot; order to be faxed to [facility] in [place].    Return to clinic on 11/20 with imaging and labs done prior to appt at [facility].     Shannen Hung MD  Neuro-oncology  10/20/2020

## 2020-10-19 NOTE — PROGRESS NOTES
"Africa Dempsey is a 45 year old female who is being evaluated via a billable video visit.      The patient has been notified of following:     \"This video visit will be conducted via a call between you and your physician/provider. We have found that certain health care needs can be provided without the need for an in-person physical exam.  This service lets us provide the care you need with a video conversation.  If a prescription is necessary we can send it directly to your pharmacy.  If lab work is needed we can place an order for that and you can then stop by our lab to have the test done at a later time.    Video visits are billed at different rates depending on your insurance coverage.  Please reach out to your insurance provider with any questions.    If during the course of the call the physician/provider feels a video visit is not appropriate, you will not be charged for this service.\"    Patient has given verbal consent for Video visit? Yes  How would you like to obtain your AVS? MyChart  If you are dropped from the video visit, the video invite should be resent to: Send to e-mail at: [email or web address]  Will anyone else be joining your video visit? Yes: Yohana. How would they like to receive their invitation? Send to e-mail at: [email or web address]      Video-Visit Details  Type of service:  Video Visit    Video Start Time: 4:04 PM  Video End Time: 4:37 PM    Originating Location (pt. Location): Home    Distant Location (provider location):  [facility]     Platform used for Video Visit: Blanche Hung MD    __________________________________________________    NEURO-ONCOLOGY VISIT  Oct 20, 2020    CHIEF COMPLAINT: Ms. Africa Dempsey is a 45 year old right-handed woman with a left frontal glioblastoma (IDH wildtype, MGMT promoter unmethylated), diagnosed following resection on 10/1/2019. Following initial diagnosis, Africa deferred standard upfront " "chemoradiotherapy in favor of alternative/ holistic options. Unfortunately, imaging in 7/2020 showed local as well as distant disease progression. She was deemed a non-surgical candidate.     Africa completed chemoradiotherapy on 8/5/2020. She completed 1 cycle of adjuvant temozolomide when in  9/2020, she became more symptomatic and imaging demonstrated disease progression.     She is currently on bevacizumab (nani) plus lomustine.     I met with Africa and Jim () today for this follow-up visit.     HISTORY OF PRESENT ILLNESS  -Africa is feel \"good\".   -The first dose of lomustine was well tolerated. No nausea. Good appetite.   -Tolerating nani infusions well. Since starting nani, experiencing less headaches, more cognitively intact with better word finding, and is feeling stronger. Balance is better.   -Feeling fatigued from time to time. Poor sleep in the setting of steroid use. Remaining active; walking about 30 minutes/ day.   -Dexamethasone dose is 4mg TID. Experiencing oral thrush; starting to use magic mouth wash.   -Over the past month, she denies any recurrent seizures. Tolerated the increased dose of Vimpat well.  -Mood is more positive.   -No recurrent episodes of numbness.    -Noting leg swelling, previously declined recommended leg U/S. No SOB, but mild GORDON with more intense exercise.   -Abdominal bloating; xray of abdomin with much stool, but no obstruction. Bowel movements have been more frequent with an increased bowel regimen. Using enemas.     REVIEW OF SYSTEMS  A comprehensive ROS negative except as in HPI.      MEDICATIONS   Current Outpatient Medications   Medication Sig Dispense Refill     dexamethasone (DECADRON) 4 MG tablet Take 1 tablet (4 mg) by mouth 3 times daily 90 tablet 1     Lacosamide (VIMPAT) 100 MG TABS tablet Take 1 tablet (100 mg) by mouth 2 times daily 60 tablet 5     lomustine (GLEOSTINE) 100 MG capsule Take 2 capsules of 100mg both on Day 1 of each cycle on empty " stomach to decrease nausea. 2 capsule 0     magic mouthwash (ENTER INGREDIENTS IN COMMENTS) suspension Swish & spit two teaspoons by mouth every four to six hours as needed (do not swallow). 480 mL 1     dexamethasone (DECADRON) 2 MG tablet Take 2 tablets (4 mg) by mouth daily (with breakfast) (Patient not taking: Reported on 10/13/2020) 60 tablet 1     ondansetron (ZOFRAN) 4 MG tablet Take 1 tablet (4 mg) by mouth At Bedtime 30 minutes prior to chemotherapy dosing and repeat every 8 hours as needed for nausea. (Patient not taking: Reported on 10/20/2020) 30 tablet 3     pantoprazole (PROTONIX) 40 MG EC tablet        temozolomide (TEMODAR) 140 MG capsule Take 2 capsules (280 mg) by mouth daily for 5 days Take ondansetron 30-60 min before temozolomide. Take at bedtime on an empty stomach. 10 capsule 0     DRUG ALLERGIES   Allergies   Allergen Reactions     Amoxicillin Hives       ONCOLOGIC HISTORY  -9/2019 PRESENTATION: Progressively worsening headaches over the past 3 weeks   -9/30/2019 MR brain imaging demonstrated a left frontal multi-lobed rim-enhancing mass lesion with associated edema.   -10/1/2019 SURGERY: Craniotomy for mass resection, gross total.   PATHOLOGY: Glioblastoma; IDH1-R132H wildtype/ IDH1, IHD2 wildtype by next generation sequencing. TP53 mutated.  BRAF, PTEN not mutated. MGMT promoter unmethylated.  -10/28/2019 NEURO-ONC: Recommending chemoradiotherapy, however, Africa is opting for alternative/ holistic options.   -7/1/2020 MRB with enhancement around the resection cavity, increased in size with extension into the corpus callosum. There are at least 3 new lesions in the left cerebral hemisphere, distant from the resection cavity.   -7/6/2020 NEURO-ONC: Clinically better on dexamethasone and Keppra. Wanting to initiate standard upfront treatment.   -7/16 - 8/5/2020 CHEMORADS: 40 Gy in 15 fractions with concurrent temozolomide.   -7/27/2020 NEURO-ONC: Continue chemoradiotherapy. Discussed Optune;  "waiting on this option.   -8/25/2020 SZ: Started Vimpat.   -8/31/2020 NEURO-ONC/ MRB/ CHEMO: Clinically overall stable. Started Vimpat, well tolerated. Dexamethasone 4mg daily. Imaging with concern for early disease progression. Initiating adjuvant temozolomide 150mg/m2 (280mg), cycle 1 to start on 9/2. Optune to be starting.   -9/22/2020 NEURO-ONC/ MRB/ CHEMO: Clinically worse with refractory headaches and fatigue. No seizures on Vimpat. Dexamethasone 4mg TID. Imaging with disease progression and increase perfusion. Stopping adjuvant temozolomide. Starting bevacizumab (sandra) + lomustine.  -9/25/2020 CHEMO: Lomustine, cycle 1.   -9/29/2020 CHEMO: Sandra, first infusion.   -10/20/2020 NEURO-ONC/ CHEMO: Clinically, much improved. Continue sandra. Tapering dexamethasone. Order of leg U/S.     SOCIAL HISTORY   Tobacco use: Never smoker.   Alcohol use: Social.   , 2 children.   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      PHYSICAL EXAMINATION  -Generally well appearing. Good energy.  -Respiratory: No audible wheezing.   -Skin: No facial rashes.  -Psychiatric: Normal mood and affect. Pleasant, talkative.  -Neurologic:   MENTAL STATUS:     Alert, oriented to date.    Recall: Much improved from last month, mild confusion.     Speech is much more fluent.    Wording finding is better; \"red\" \"pillow\" \"Chevy car\".    Comprehension with mild impairments.     CRANIAL NERVES:     Pupils are equal, round, reactive to light.     Extraocular movements full, patient denies diplopia.     Right facial droop, much less.   Hearing intact.   With normal phonation, no dysfunction of the palate or tongue.   MOTOR: Antigravity in arms. No pronation or drift.   SENSATION: Intact to light touch throughout.   COORDINATION: Intact bilaterally.     The rest of a comprehensive physical examination is deferred due to PHE (public health emergency) video visit restrictions.        MEDICAL RECORDS  Obtained and personally reviewed " all available outside medical records in addition to reviewing any records available in our electronic system.     LABS  Personally reviewed all available lab results.     IMAGING  No new neuro-imaging to review.        IMPRESSION  Clinic was spent discussing in detail the nature of her cancer in light of her current treatment plan. This was in addition to providing emotional support, answering questions pertaining to my recommendations, and devising the plan as outlined below.     Clinically, Africa is doing much better since starting nani. She is experiences less severe headache, aphasia, and confusion. Her strength and balance is also improved. Dexamethasone dose is currently 4mg TID and Africa is open to tapering as she is noting associated insomnia and oral thrush with this high of steroid dosing. She has not had any breakthrough seizures over the past month and continues to tolerate Vimpat well. Leg swelling/ skin tightness remains an issue and she is open to having a U/S to rule out a blood clot. For evaluation of abdominal bloating reported last week, an xray of abdomin showed much stool, but no obstruction. With an accelerated bowel regimen + enemas, bowel movements are very routine. Again, stressed the importance of avoiding constipation while on nani over concern for bowel perforation.    The first dose of lomustine was well tolerated. Next due on 11/20 (8 week interval). Will continue to watch lab results. Will repeat imaging at that time.     Continue nani every 2 weeks.     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     -STEROIDS-  -Dexamethasone taper;    -8mg total daily for 1 week   -6mg daily for 1 week    -4mg daily to continue   -Continue using magic mouth wash for oral thrush; should improve with reduction in steroids.    -Sleep to also improve with less steroids.     -SEIZURE  MANAGEMENT-  -History of seizures, requests anti-eplipetics.   -No driving for now.  -Continue Vimpat 100mg/m2.  -Did not tolerate Keppra.     -Quality of life/ MOOD/ FATIGUE-  -Denies any mood issues.  -Continue to monitor mood as untreated/ undertreated depression can worsen fatigue, dysorexia, and quality of life.    -BRADYCARDIA-  -HR in the 40's.   -Continue to follow with cardiology.     Return to clinic with me on or prior to 11/20 + imaging + labs     In the meantime, Africa knows to call with questions or concerns or to report new complaints and can be seen sooner if needed.    Shannen Hung MD  Neuro-oncology

## 2020-10-20 NOTE — LETTER
"    10/20/2020         RE: Africa Dempsey  [address]  [facility] [address]        Dear Colleague,    Thank you for referring your patient, Africa Dempsey, to the [facility]. Please see a copy of my visit note below.    Africa Dempsey is a 45 year old female who is being evaluated via a billable video visit.      The patient has been notified of following:     \"This video visit will be conducted via a call between you and your physician/provider. We have found that certain health care needs can be provided without the need for an in-person physical exam.  This service lets us provide the care you need with a video conversation.  If a prescription is necessary we can send it directly to your pharmacy.  If lab work is needed we can place an order for that and you can then stop by our lab to have the test done at a later time.    Video visits are billed at different rates depending on your insurance coverage.  Please reach out to your insurance provider with any questions.    If during the course of the call the physician/provider feels a video visit is not appropriate, you will not be charged for this service.\"    Patient has given verbal consent for Video visit? Yes  How would you like to obtain your AVS? MyChart  If you are dropped from the video visit, the video invite should be resent to: Send to e-mail at: [email or web address]  Will anyone else be joining your video visit? Yes: Yohana. How would they like to receive their invitation? Send to e-mail at: [email or web address]      Video-Visit Details  Type of service:  Video Visit    Video Start Time: 4:04 PM  Video End Time: 4:37 PM    Originating Location (pt. Location): Home    Distant Location (provider location):  [facility]     Platform used for Video Visit: Blanche Hung MD    __________________________________________________    NEURO-ONCOLOGY VISIT  Oct 20, 2020    CHIEF " "COMPLAINT: Ms. Africa Dempsey is a 45 year old right-handed woman with a left frontal glioblastoma (IDH wildtype, MGMT promoter unmethylated), diagnosed following resection on 10/1/2019. Following initial diagnosis, Africa deferred standard upfront chemoradiotherapy in favor of alternative/ holistic options. Unfortunately, imaging in 7/2020 showed local as well as distant disease progression. She was deemed a non-surgical candidate.     Africa completed chemoradiotherapy on 8/5/2020. She completed 1 cycle of adjuvant temozolomide when in  9/2020, she became more symptomatic and imaging demonstrated disease progression.     She is currently on bevacizumab (nani) plus lomustine.     I met with Africa and Jim () today for this follow-up visit.     HISTORY OF PRESENT ILLNESS  -Africa is feel \"good\".   -The first dose of lomustine was well tolerated. No nausea. Good appetite.   -Tolerating nani infusions well. Since starting nani, experiencing less headaches, more cognitively intact with better word finding, and is feeling stronger. Balance is better.   -Feeling fatigued from time to time. Poor sleep in the setting of steroid use. Remaining active; walking about 30 minutes/ day.   -Dexamethasone dose is 4mg TID. Experiencing oral thrush; starting to use magic mouth wash.   -Over the past month, she denies any recurrent seizures. Tolerated the increased dose of Vimpat well.  -Mood is more positive.   -No recurrent episodes of numbness.    -Noting leg swelling, previously declined recommended leg U/S. No SOB, but mild GORDON with more intense exercise.   -Abdominal bloating; xray of abdomin with much stool, but no obstruction. Bowel movements have been more frequent with an increased bowel regimen. Using enemas.     REVIEW OF SYSTEMS  A comprehensive ROS negative except as in HPI.      MEDICATIONS   Current Outpatient Medications   Medication Sig Dispense Refill     dexamethasone (DECADRON) 4 MG tablet Take 1 tablet " (4 mg) by mouth 3 times daily 90 tablet 1     Lacosamide (VIMPAT) 100 MG TABS tablet Take 1 tablet (100 mg) by mouth 2 times daily 60 tablet 5     lomustine (GLEOSTINE) 100 MG capsule Take 2 capsules of 100mg both on Day 1 of each cycle on empty stomach to decrease nausea. 2 capsule 0     magic mouthwash (ENTER INGREDIENTS IN COMMENTS) suspension Swish & spit two teaspoons by mouth every four to six hours as needed (do not swallow). 480 mL 1     dexamethasone (DECADRON) 2 MG tablet Take 2 tablets (4 mg) by mouth daily (with breakfast) (Patient not taking: Reported on 10/13/2020) 60 tablet 1     ondansetron (ZOFRAN) 4 MG tablet Take 1 tablet (4 mg) by mouth At Bedtime 30 minutes prior to chemotherapy dosing and repeat every 8 hours as needed for nausea. (Patient not taking: Reported on 10/20/2020) 30 tablet 3     pantoprazole (PROTONIX) 40 MG EC tablet        temozolomide (TEMODAR) 140 MG capsule Take 2 capsules (280 mg) by mouth daily for 5 days Take ondansetron 30-60 min before temozolomide. Take at bedtime on an empty stomach. 10 capsule 0     DRUG ALLERGIES   Allergies   Allergen Reactions     Amoxicillin Hives       ONCOLOGIC HISTORY  -9/2019 PRESENTATION: Progressively worsening headaches over the past 3 weeks   -9/30/2019 MR brain imaging demonstrated a left frontal multi-lobed rim-enhancing mass lesion with associated edema.   -10/1/2019 SURGERY: Craniotomy for mass resection, gross total.   PATHOLOGY: Glioblastoma; IDH1-R132H wildtype/ IDH1, IHD2 wildtype by next generation sequencing. TP53 mutated.  BRAF, PTEN not mutated. MGMT promoter unmethylated.  -10/28/2019 NEURO-ONC: Recommending chemoradiotherapy, however, Africa is opting for alternative/ holistic options.   -7/1/2020 MRB with enhancement around the resection cavity, increased in size with extension into the corpus callosum. There are at least 3 new lesions in the left cerebral hemisphere, distant from the resection cavity.   -7/6/2020 NEURO-ONC:  "Clinically better on dexamethasone and Keppra. Wanting to initiate standard upfront treatment.   -7/16 - 8/5/2020 CHEMORADS: 40 Gy in 15 fractions with concurrent temozolomide.   -7/27/2020 NEURO-ONC: Continue chemoradiotherapy. Discussed Optune; waiting on this option.   -8/25/2020 SZ: Started Vimpat.   -8/31/2020 NEURO-ONC/ MRB/ CHEMO: Clinically overall stable. Started Vimpat, well tolerated. Dexamethasone 4mg daily. Imaging with concern for early disease progression. Initiating adjuvant temozolomide 150mg/m2 (280mg), cycle 1 to start on 9/2. Optune to be starting.   -9/22/2020 NEURO-ONC/ MRB/ CHEMO: Clinically worse with refractory headaches and fatigue. No seizures on Vimpat. Dexamethasone 4mg TID. Imaging with disease progression and increase perfusion. Stopping adjuvant temozolomide. Starting bevacizumab (sandra) + lomustine.  -9/25/2020 CHEMO: Lomustine, cycle 1.   -9/29/2020 CHEMO: Sandra, first infusion.   -10/20/2020 NEURO-ONC/ CHEMO: Clinically, much improved. Continue sandra. Tapering dexamethasone. Order of leg U/S.     SOCIAL HISTORY   Tobacco use: Never smoker.   Alcohol use: Social.   , 2 children.   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      PHYSICAL EXAMINATION  -Generally well appearing. Good energy.  -Respiratory: No audible wheezing.   -Skin: No facial rashes.  -Psychiatric: Normal mood and affect. Pleasant, talkative.  -Neurologic:   MENTAL STATUS:     Alert, oriented to date.    Recall: Much improved from last month, mild confusion.     Speech is much more fluent.    Wording finding is better; \"red\" \"pillow\" \"Chevy car\".    Comprehension with mild impairments.     CRANIAL NERVES:     Pupils are equal, round, reactive to light.     Extraocular movements full, patient denies diplopia.     Right facial droop, much less.   Hearing intact.   With normal phonation, no dysfunction of the palate or tongue.   MOTOR: Antigravity in arms. No pronation or drift.   SENSATION: Intact " to light touch throughout.   COORDINATION: Intact bilaterally.     The rest of a comprehensive physical examination is deferred due to PHE (public health emergency) video visit restrictions.        MEDICAL RECORDS  Obtained and personally reviewed all available outside medical records in addition to reviewing any records available in our electronic system.     LABS  Personally reviewed all available lab results.     IMAGING  No new neuro-imaging to review.        IMPRESSION  Clinic was spent discussing in detail the nature of her cancer in light of her current treatment plan. This was in addition to providing emotional support, answering questions pertaining to my recommendations, and devising the plan as outlined below.     Clinically, Africa is doing much better since starting nani. She is experiences less severe headache, aphasia, and confusion. Her strength and balance is also improved. Dexamethasone dose is currently 4mg TID and Africa is open to tapering as she is noting associated insomnia and oral thrush with this high of steroid dosing. She has not had any breakthrough seizures over the past month and continues to tolerate Vimpat well. Leg swelling/ skin tightness remains an issue and she is open to having a U/S to rule out a blood clot. For evaluation of abdominal bloating reported last week, an xray of abdomin showed much stool, but no obstruction. With an accelerated bowel regimen + enemas, bowel movements are very routine. Again, stressed the importance of avoiding constipation while on nani over concern for bowel perforation.    The first dose of lomustine was well tolerated. Next due on 11/20 (8 week interval). Will continue to watch lab results. Will repeat imaging at that time.     Continue nani every 2 weeks.     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     -STEROIDS-  -Dexamethasone  taper;    -8mg total daily for 1 week   -6mg daily for 1 week    -4mg daily to continue   -Continue using magic mouth wash for oral thrush; should improve with reduction in steroids.    -Sleep to also improve with less steroids.     -SEIZURE MANAGEMENT-  -History of seizures, requests anti-eplipetics.   -No driving for now.  -Continue Vimpat 100mg/m2.  -Did not tolerate Keppra.     -Quality of life/ MOOD/ FATIGUE-  -Denies any mood issues.  -Continue to monitor mood as untreated/ undertreated depression can worsen fatigue, dysorexia, and quality of life.    -BRADYCARDIA-  -HR in the 40's.   -Continue to follow with cardiology.     Return to clinic with me on or prior to 11/20 + imaging + labs     In the meantime, Africa knows to call with questions or concerns or to report new complaints and can be seen sooner if needed.    Shannen Hung MD  Neuro-oncology      Again, thank you for allowing me to participate in the care of your patient.        Sincerely,        Shannen Hung MD

## 2020-10-20 NOTE — LETTER
"    10/20/2020         RE: Africa Dempsey  [address]  [facility] [address]        Dear Colleague,    Thank you for referring your patient, Africa Dempsey, to the [facility]. Please see a copy of my visit note below.    Africa Dempsey is a 45 year old female who is being evaluated via a billable video visit.      The patient has been notified of following:     \"This video visit will be conducted via a call between you and your physician/provider. We have found that certain health care needs can be provided without the need for an in-person physical exam.  This service lets us provide the care you need with a video conversation.  If a prescription is necessary we can send it directly to your pharmacy.  If lab work is needed we can place an order for that and you can then stop by our lab to have the test done at a later time.    Video visits are billed at different rates depending on your insurance coverage.  Please reach out to your insurance provider with any questions.    If during the course of the call the physician/provider feels a video visit is not appropriate, you will not be charged for this service.\"    Patient has given verbal consent for Video visit? Yes  How would you like to obtain your AVS? MyChart  If you are dropped from the video visit, the video invite should be resent to: Send to e-mail at: [email or web address]  Will anyone else be joining your video visit? Yes: Yohana. How would they like to receive their invitation? Send to e-mail at: [email or web address]      Video-Visit Details  Type of service:  Video Visit    Video Start Time: 4:04 PM  Video End Time: 4:37 PM    Originating Location (pt. Location): Home    Distant Location (provider location):  [facility]     Platform used for Video Visit: Blanche Hung MD    __________________________________________________    NEURO-ONCOLOGY VISIT  Oct 20, 2020    CHIEF " "COMPLAINT: Ms. Africa Dempsey is a 45 year old right-handed woman with a left frontal glioblastoma (IDH wildtype, MGMT promoter unmethylated), diagnosed following resection on 10/1/2019. Following initial diagnosis, Africa deferred standard upfront chemoradiotherapy in favor of alternative/ holistic options. Unfortunately, imaging in 7/2020 showed local as well as distant disease progression. She was deemed a non-surgical candidate.     Africa completed chemoradiotherapy on 8/5/2020. She completed 1 cycle of adjuvant temozolomide when in  9/2020, she became more symptomatic and imaging demonstrated disease progression.     She is currently on bevacizumab (nani) plus lomustine.     I met with Africa and Jim () today for this follow-up visit.     HISTORY OF PRESENT ILLNESS  -Africa is feel \"good\".   -The first dose of lomustine was well tolerated. No nausea. Good appetite.   -Tolerating nani infusions well. Since starting nani, experiencing less headaches, more cognitively intact with better word finding, and is feeling stronger. Balance is better.   -Feeling fatigued from time to time. Poor sleep in the setting of steroid use. Remaining active; walking about 30 minutes/ day.   -Dexamethasone dose is 4mg TID. Experiencing oral thrush; starting to use magic mouth wash.   -Over the past month, she denies any recurrent seizures. Tolerated the increased dose of Vimpat well.  -Mood is more positive.   -No recurrent episodes of numbness.    -Noting leg swelling, previously declined recommended leg U/S. No SOB, but mild GORDON with more intense exercise.   -Abdominal bloating; xray of abdomin with much stool, but no obstruction. Bowel movements have been more frequent with an increased bowel regimen. Using enemas.     REVIEW OF SYSTEMS  A comprehensive ROS negative except as in HPI.      MEDICATIONS   Current Outpatient Medications   Medication Sig Dispense Refill     dexamethasone (DECADRON) 4 MG tablet Take 1 tablet " (4 mg) by mouth 3 times daily 90 tablet 1     Lacosamide (VIMPAT) 100 MG TABS tablet Take 1 tablet (100 mg) by mouth 2 times daily 60 tablet 5     lomustine (GLEOSTINE) 100 MG capsule Take 2 capsules of 100mg both on Day 1 of each cycle on empty stomach to decrease nausea. 2 capsule 0     magic mouthwash (ENTER INGREDIENTS IN COMMENTS) suspension Swish & spit two teaspoons by mouth every four to six hours as needed (do not swallow). 480 mL 1     dexamethasone (DECADRON) 2 MG tablet Take 2 tablets (4 mg) by mouth daily (with breakfast) (Patient not taking: Reported on 10/13/2020) 60 tablet 1     ondansetron (ZOFRAN) 4 MG tablet Take 1 tablet (4 mg) by mouth At Bedtime 30 minutes prior to chemotherapy dosing and repeat every 8 hours as needed for nausea. (Patient not taking: Reported on 10/20/2020) 30 tablet 3     pantoprazole (PROTONIX) 40 MG EC tablet        temozolomide (TEMODAR) 140 MG capsule Take 2 capsules (280 mg) by mouth daily for 5 days Take ondansetron 30-60 min before temozolomide. Take at bedtime on an empty stomach. 10 capsule 0     DRUG ALLERGIES   Allergies   Allergen Reactions     Amoxicillin Hives       ONCOLOGIC HISTORY  -9/2019 PRESENTATION: Progressively worsening headaches over the past 3 weeks   -9/30/2019 MR brain imaging demonstrated a left frontal multi-lobed rim-enhancing mass lesion with associated edema.   -10/1/2019 SURGERY: Craniotomy for mass resection, gross total.   PATHOLOGY: Glioblastoma; IDH1-R132H wildtype/ IDH1, IHD2 wildtype by next generation sequencing. TP53 mutated.  BRAF, PTEN not mutated. MGMT promoter unmethylated.  -10/28/2019 NEURO-ONC: Recommending chemoradiotherapy, however, Africa is opting for alternative/ holistic options.   -7/1/2020 MRB with enhancement around the resection cavity, increased in size with extension into the corpus callosum. There are at least 3 new lesions in the left cerebral hemisphere, distant from the resection cavity.   -7/6/2020 NEURO-ONC:  "Clinically better on dexamethasone and Keppra. Wanting to initiate standard upfront treatment.   -7/16 - 8/5/2020 CHEMORADS: 40 Gy in 15 fractions with concurrent temozolomide.   -7/27/2020 NEURO-ONC: Continue chemoradiotherapy. Discussed Optune; waiting on this option.   -8/25/2020 SZ: Started Vimpat.   -8/31/2020 NEURO-ONC/ MRB/ CHEMO: Clinically overall stable. Started Vimpat, well tolerated. Dexamethasone 4mg daily. Imaging with concern for early disease progression. Initiating adjuvant temozolomide 150mg/m2 (280mg), cycle 1 to start on 9/2. Optune to be starting.   -9/22/2020 NEURO-ONC/ MRB/ CHEMO: Clinically worse with refractory headaches and fatigue. No seizures on Vimpat. Dexamethasone 4mg TID. Imaging with disease progression and increase perfusion. Stopping adjuvant temozolomide. Starting bevacizumab (sandra) + lomustine.  -9/25/2020 CHEMO: Lomustine, cycle 1.   -9/29/2020 CHEMO: Sandra, first infusion.   -10/20/2020 NEURO-ONC/ CHEMO: Clinically, much improved. Continue sandra. Tapering dexamethasone. Order of leg U/S.     SOCIAL HISTORY   Tobacco use: Never smoker.   Alcohol use: Social.   , 2 children.   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      PHYSICAL EXAMINATION  -Generally well appearing. Good energy.  -Respiratory: No audible wheezing.   -Skin: No facial rashes.  -Psychiatric: Normal mood and affect. Pleasant, talkative.  -Neurologic:   MENTAL STATUS:     Alert, oriented to date.    Recall: Much improved from last month, mild confusion.     Speech is much more fluent.    Wording finding is better; \"red\" \"pillow\" \"Chevy car\".    Comprehension with mild impairments.     CRANIAL NERVES:     Pupils are equal, round, reactive to light.     Extraocular movements full, patient denies diplopia.     Right facial droop, much less.   Hearing intact.   With normal phonation, no dysfunction of the palate or tongue.   MOTOR: Antigravity in arms. No pronation or drift.   SENSATION: Intact " to light touch throughout.   COORDINATION: Intact bilaterally.     The rest of a comprehensive physical examination is deferred due to PHE (public health emergency) video visit restrictions.        MEDICAL RECORDS  Obtained and personally reviewed all available outside medical records in addition to reviewing any records available in our electronic system.     LABS  Personally reviewed all available lab results.     IMAGING  No new neuro-imaging to review.        IMPRESSION  Clinic was spent discussing in detail the nature of her cancer in light of her current treatment plan. This was in addition to providing emotional support, answering questions pertaining to my recommendations, and devising the plan as outlined below.     Clinically, Africa is doing much better since starting nani. She is experiences less severe headache, aphasia, and confusion. Her strength and balance is also improved. Dexamethasone dose is currently 4mg TID and Africa is open to tapering as she is noting associated insomnia and oral thrush with this high of steroid dosing. She has not had any breakthrough seizures over the past month and continues to tolerate Vimpat well. Leg swelling/ skin tightness remains an issue and she is open to having a U/S to rule out a blood clot. For evaluation of abdominal bloating reported last week, an xray of abdomin showed much stool, but no obstruction. With an accelerated bowel regimen + enemas, bowel movements are very routine. Again, stressed the importance of avoiding constipation while on nani over concern for bowel perforation.    The first dose of lomustine was well tolerated. Next due on 11/20 (8 week interval). Will continue to watch lab results. Will repeat imaging at that time.     Continue nani every 2 weeks.     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     -STEROIDS-  -Dexamethasone  taper;    -8mg total daily for 1 week   -6mg daily for 1 week    -4mg daily to continue   -Continue using magic mouth wash for oral thrush; should improve with reduction in steroids.    -Sleep to also improve with less steroids.     -SEIZURE MANAGEMENT-  -History of seizures, requests anti-eplipetics.   -No driving for now.  -Continue Vimpat 100mg/m2.  -Did not tolerate Keppra.     -Quality of life/ MOOD/ FATIGUE-  -Denies any mood issues.  -Continue to monitor mood as untreated/ undertreated depression can worsen fatigue, dysorexia, and quality of life.    -BRADYCARDIA-  -HR in the 40's.   -Continue to follow with cardiology.     Return to clinic with me on or prior to 11/20 + imaging + labs     In the meantime, Africa knows to call with questions or concerns or to report new complaints and can be seen sooner if needed.    Shannen Hung MD  Neuro-oncology      Again, thank you for allowing me to participate in the care of your patient.        Sincerely,        Shannen Hung MD

## 2020-10-21 NOTE — PROGRESS NOTES
Patient needs follow up MRI on or around 7/2/20. Orders faxed to [facility] [phone]. Requested to call patient to schedule. Fax confirmation received.

## 2020-10-27 NOTE — PROGRESS NOTES
Infusion Nursing Note:  Africa ASTORGA Roselyn presents today for MVASI.    Patient seen by provider today: No   present during visit today: Not Applicable.    Note: N/A.    Intravenous Access:  Peripheral IV placed.    Treatment Conditions:  Not Applicable.      Post Infusion Assessment:  Patient tolerated infusion without incident.  Blood return noted pre and post infusion.  Site patent and intact, free from redness, edema or discomfort.  No evidence of extravasations.  Access discontinued per protocol.       Discharge Plan:   Discharge instructions reviewed with: Patient.  Patient and/or family verbalized understanding of discharge instructions and all questions answered.  Copy of AVS reviewed with patient and/or family.  Patient will return 11/10/20 for next appointment.  Patient discharged in stable condition accompanied by: self.  Departure Mode: Ambulatory.    Raimundo Darling RN

## 2020-11-04 NOTE — TELEPHONE ENCOUNTER
pts  reporting difficulty filling dexamethasone rx with current sig. Is to start 6mg daily tmrw. Would like it to say 2mg tabs, take one tab TID in order for insurance to fill.

## 2020-11-10 NOTE — PROGRESS NOTES
Infusion Nursing Note:  Africa Dempsey presents today for MVAS c2d1I.    Patient seen by provider today: No   present during visit today: Not Applicable.    Note: N/A.    Intravenous Access:  Peripheral IV placed.    Treatment Conditions:  Lab Results   Component Value Date    HGB 14.4 11/10/2020     Lab Results   Component Value Date    WBC 6.4 11/10/2020      Lab Results   Component Value Date    ANEU 5.1 11/10/2020     Lab Results   Component Value Date     11/10/2020      Lab Results   Component Value Date     11/10/2020                   Lab Results   Component Value Date    POTASSIUM 4.9 11/10/2020           Lab Results   Component Value Date    MAG 2.2 07/03/2020            Lab Results   Component Value Date    CR 0.64 11/10/2020                   Lab Results   Component Value Date    HANH 10.1 11/10/2020                Lab Results   Component Value Date    BILITOTAL 0.2 11/10/2020           Lab Results   Component Value Date    ALBUMIN 3.8 11/10/2020                    Lab Results   Component Value Date    ALT 51 11/10/2020           Lab Results   Component Value Date    AST 19 11/10/2020       Results reviewed, labs MET treatment parameters, ok to proceed with treatment.      Post Infusion Assessment:  Patient tolerated infusion without incident.  Blood return noted pre and post infusion.  Site patent and intact, free from redness, edema or discomfort.  No evidence of extravasations.  Access discontinued per protocol.       Discharge Plan:   Discharge instructions reviewed with: Patient.  Patient and/or family verbalized understanding of discharge instructions and all questions answered.  Copy of AVS reviewed with patient and/or family.  Patient will return 11/24/20 for next appointment.  Patient discharged in stable condition accompanied by: self.  Departure Mode: Ambulatory.    Raimundo Darling RN

## 2020-11-11 NOTE — TELEPHONE ENCOUNTER
Spoke with Dr. Hung, patient to take 100 mg Vimpat now if patient would like to do so. 150 mg tonight and then 150 mg BID starting tomorrow morning. This was relayed to patient and  who verbalize understanding.    Patient denies fever, chills, cough, congestion, urinary symptoms or abdominal pain.     Patient and spouse will contact clinic with any questions or concerns.

## 2020-11-11 NOTE — TELEPHONE ENCOUNTER
Per uma Liao to move patient appointment to 11/16 at 0930. Patient in agreement and would like to move 11/20 appointment to 11/16. Message sent to scheduling to contact patient to confirm.

## 2020-11-11 NOTE — TELEPHONE ENCOUNTER
"Spoke with patient and spouse, Jim. Patient \"collapsed\" this morning around 0715. Spouse states she lost consciousness, was motionless  for about thirty to forty seconds. She began to groan deeply for about fifteen seconds, then became alert. She did not remember what happened. Spouse does not believe she hit her head when she fell. Spoke with patient who states she feels okay, fatigued as she describes she typically feels, just awoke from a nap. Spouse states after incident heart rate was varied, irregular, between 50-65 bpm. Heart rate is now stable at 63 bpm and BP is 127/91. Denies pain, fever, chills, trouble breathing. Paged Dr. Hung at 0920    MRI yesterday, inquiring if they can they receive results sooner.        "

## 2020-11-15 NOTE — PATIENT INSTRUCTIONS
Imaging with very positive treatment response.     Lomustine; Cycle 2 due this week.   Can start following review of labs from this week.   Start repeating labs 4 weeks after starting chemotherapy     Continue nani every 2 weeks.   Critical to avoid constipation.     Dexamethasone taper; Decrease 4mg daily.    Continue Vimpat at higher dose; 150mg twice a day.   -No driving for 3 months (mid-Feb 2021).    Return to clinic in mid-Jan 2020 with imaging and labs done prior to appt at [facility].     Shannen Hung MD  Neuro-oncology  11/16/20

## 2020-11-15 NOTE — PROGRESS NOTES
"Africa Dempsey is a 45 year old female who is being evaluated via a billable video visit.      The patient has been notified of following:     \"This video visit will be conducted via a call between you and your physician/provider. We have found that certain health care needs can be provided without the need for an in-person physical exam.  This service lets us provide the care you need with a video conversation.  If a prescription is necessary we can send it directly to your pharmacy.  If lab work is needed we can place an order for that and you can then stop by our lab to have the test done at a later time.    Video visits are billed at different rates depending on your insurance coverage.  Please reach out to your insurance provider with any questions.    If during the course of the call the physician/provider feels a video visit is not appropriate, you will not be charged for this service.\"    Patient has given verbal consent for Video visit? Yes  How would you like to obtain your AVS? MyChart  If you are dropped from the video visit, the video invite should be resent to: Text to cell phone: [phone]  Will anyone else be joining your video visit? No    Vitals - Patient Reported  Weight (Patient Reported): 71.7 kg (158 lb)  Pain Score: No Pain (0)    I have reviewed and updated patient's allergy and medication list.    Concerns: Questions - When to take the next chemo pill? Should Vimpat be causes swelling that lead to a seizure?  How long will the treatment last?  Refills: None    Pura Lyles CMA      Video-Visit Details  Type of service:  Video Visit    Video Start Time: 9:40 AM  Video End Time: 10:06 AM    Originating Location (pt. Location): Home    Distant Location (provider location):  [facility]     Platform used for Video Visit: Blanche Hung MD    __________________________________________________    NEURO-ONCOLOGY VISIT  Nov 16, 2020    CHIEF " "COMPLAINT: Ms. Africa Dempsey is a 45 year old right-handed woman with a left frontal glioblastoma (IDH wildtype, MGMT promoter unmethylated), diagnosed following resection on 10/1/2019. Following initial diagnosis, Africa deferred standard upfront chemoradiotherapy in favor of alternative/ holistic options. Unfortunately, imaging in 7/2020 showed local as well as distant disease progression. She was deemed a non-surgical candidate.     Africa completed chemoradiotherapy on 8/5/2020. She completed 1 cycle of adjuvant temozolomide when in  9/2020, she became more symptomatic and imaging demonstrated disease progression. Temozolomide was stopped and she was transitioned to second-line therapy.     She is currently managed on bevacizumab (nani) plus lomustine. Imaging in 11/2020 showed a positive treatment response.     I met with Africa and Jim () today for this follow-up visit.     HISTORY OF PRESENT ILLNESS  -Last week, Africa had a recent generalized seizure events with loss of consciousness lasting for 30 seconds; aura of an atypical taste in her month, saw a flash of light. Mild confusion afterwards. HR was ranging from 40-60. Tolerated dose increase of Vimpat to 150mg BID. No worsening in fatigue with dose increase; continued chronic fatigue, especially with tapering dexamethasone. Dexamethasone taper otherwise well tolerated, now on 6mg daily. Resolved oral thrush with use of magic mouth wash.   -Otherwise, Africa is feeling \"good\".   -No nausea. Good appetite. Less abdominal bloating, but still present. No real issue currently with constipation.   -Tolerating nani infusions well.  -Minimal to no headaches  -More cognitively intact with better word finding. Speech is more articulate.   -Feeling stronger with better balance. Remaining active; walking about 10-30 minutes/ day.   -Mood is positive.   -No numbness.    -Less leg swelling; resent U/S negative for DVT. No SOB, but mild GORDON with more intense " exercise.     REVIEW OF SYSTEMS  A comprehensive ROS negative except as in HPI.      MEDICATIONS   Current Outpatient Medications   Medication Sig Dispense Refill     dexamethasone (DECADRON) 2 MG tablet Take 1 tablet (2 mg) by mouth 3 times daily 60 tablet 0     lomustine (GLEOSTINE) 100 MG capsule Take 2 capsules of 100mg both on Day 1 of each cycle on empty stomach to decrease nausea. 2 capsule 0     pantoprazole (PROTONIX) 40 MG EC tablet        sennosides (SENOKOT) 8.6 MG tablet Take 1 tablet by mouth daily       Lacosamide (VIMPAT) 100 MG TABS tablet Take 1.5 tablets (150 mg) by mouth 2 times daily 60 tablet 5     ondansetron (ZOFRAN) 4 MG tablet Take 1 tablet (4 mg) by mouth At Bedtime 30 minutes prior to chemotherapy dosing and repeat every 8 hours as needed for nausea. (Patient not taking: Reported on 10/20/2020) 30 tablet 3     DRUG ALLERGIES   Allergies   Allergen Reactions     Amoxicillin Hives       ONCOLOGIC HISTORY  -9/2019 PRESENTATION: Progressively worsening headaches over the past 3 weeks   -9/30/2019 MR brain imaging demonstrated a left frontal multi-lobed rim-enhancing mass lesion with associated edema.   -10/1/2019 SURGERY: Craniotomy for mass resection, gross total.   PATHOLOGY: Glioblastoma; IDH1-R132H wildtype/ IDH1, IHD2 wildtype by next generation sequencing. TP53 mutated.  BRAF, PTEN not mutated. MGMT promoter unmethylated.  -10/28/2019 NEURO-ONC: Recommending chemoradiotherapy, however, Africa is opting for alternative/ holistic options.   -7/1/2020 MRB with enhancement around the resection cavity, increased in size with extension into the corpus callosum. There are at least 3 new lesions in the left cerebral hemisphere, distant from the resection cavity.   -7/6/2020 NEURO-ONC: Clinically better on dexamethasone and Keppra. Wanting to initiate standard upfront treatment.   -7/16 - 8/5/2020 CHEMORADS: 40 Gy in 15 fractions with concurrent temozolomide.   -7/27/2020 NEURO-ONC: Continue  chemoradiotherapy. Discussed Optune; waiting on this option.   -8/25/2020 SZ: Started Vimpat.   -8/31/2020 NEURO-ONC/ MRB/ CHEMO: Clinically overall stable. Started Vimpat, well tolerated. Dexamethasone 4mg daily. Imaging with concern for early disease progression. Initiating adjuvant temozolomide 150mg/m2 (280mg), cycle 1 to start on 9/2. Optune to be starting.   -9/22/2020 NEURO-ONC/ MRB/ CHEMO: Clinically worse with refractory headaches and fatigue. No seizures on Vimpat. Dexamethasone 4mg TID. Imaging with disease progression and increase perfusion. Stopping adjuvant temozolomide. Starting bevacizumab (sandra) + lomustine.  -9/25/2020 CHEMO: Lomustine, cycle 1.   -9/29/2020 CHEMO: Sandra, first infusion.   -10/20/2020 NEURO-ONC/ CHEMO: Clinically, much improved. Continue sandra. Tapering dexamethasone. Order of leg U/S.   -10/2020 U/S of legs negative for DVT.   -11/16/2020 NEURO-ONC/ MRB/ CHEMO: Clinically improving. Imaging with positive treatment effect. Continue sandra. Decrease dexamethasone to 4mg daily. Increased Vimpat in the setting of a recent recurrent generalized seizure. Plan to start cycle 2 of lomustine this week; pending repeat lab results.     SOCIAL HISTORY   Tobacco use: Never smoker.   Alcohol use: Social.   , 2 children.   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      PHYSICAL EXAMINATION  -Generally well appearing. Good energy.  -Respiratory: No audible wheezing.   -Skin: No facial rashes.  -Psychiatric: Normal mood and affect. Pleasant, talkative.  -Neurologic:   MENTAL STATUS:     Alert, oriented to date.    Recall: Largely intact with mild confusion.     Speech is largely fluent.    More articulate.    Comprehension with mild impairments.     CRANIAL NERVES:     Pupils are equal, round, reactive to light.     Extraocular movements full, patient denies diplopia.     Visual fields appear full.    Right facial droop, much less.   Hearing intact.   With normal phonation, no  dysfunction of the palate or tongue.   MOTOR: Antigravity in arms. Mild pronation in the right hand, but no drift.   SENSATION: Intact to light touch throughout.   COORDINATION: Intact bilaterally.     The rest of a comprehensive physical examination is deferred due to PHE (public health emergency) video visit restrictions.        MEDICAL RECORDS  Obtained and personally reviewed all available outside medical records in addition to reviewing any records available in our electronic system.     LABS  Personally reviewed all available lab results.     IMAGING  Personally reviewed MR brain imaging from last week and compared to prior imaging. To my eye, there is a reduction in contrast enhancement as well as T2 FLAIR. No bleed. No new lesions.     Imaging was shown to and results were reviewed with Africa and Jim.     Case discussed at Brain Tumor Conference today.       IMPRESSION  Clinic was spent discussing in detail the nature of her cancer in light of her recent imaging and current treatment plan. This was in addition to answering questions pertaining to my recommendations and devising the plan as outlined below.     Clinically, Africa continues to improve. She is experiences less severe headache, aphasia, and confusion. Her strength and balance is also improved. Dexamethasone dose is currently 2mg TID (6mg total daily) and Africa tolerated the recent taper well. Oral thrush and insomnia related to high-dose steroid use has also improved. Will decrease dose to 4mg daily.     Africa did have a breakthrough generalized seizure last week and has tolerated the increase in Vimpat well. U/S of legs from last month was without evidence of a DVT. With an accelerated bowel regimen + enemas, bowel movements are now routine and abdominal bloating has greatly improved. It is critical that Africa avoids constipation while on nani over concern for the potential of bowel perforation.    The first dose of lomustine was well  tolerated. Recent lab results from 11/10 at goal, will plan to repeat this week and if again at goal, can start cycle 2 this week. Then, Africa is to start repeating labs 4 weeks after starting chemotherapy. Imaging from last week with a very positive response to treatment. Repeat in 2 months. Continue nani every 2 weeks.     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     -STEROIDS-  -Dexamethasone taper;    -Decrease to 4mg daily.     -SEIZURE MANAGEMENT-  -History of seizures; generalized events.   -No driving for 3 months (mid-Feb 2021).  -Continue Vimpat 150mg BID.  -Did not tolerate Keppra.     -Quality of life/ MOOD/ FATIGUE-  -Denies any mood issues.  -Continue to monitor mood as untreated/ undertreated depression can worsen fatigue, dysorexia, and quality of life.    -LEG SWELLING-  -Continue to monitor.   -U/S of legs in 10/2020 was negative for DVT.     -BRADYCARDIA-  -HR in the 40's.   -Continue to follow with cardiology.     Return to clinic with me in 2 months + imaging + labs.    In the meantime, Africa knows to call with questions or concerns or to report new complaints and can be seen sooner if needed.    Shannen Hung MD  Neuro-oncology

## 2020-11-16 NOTE — LETTER
"    11/16/2020         RE: Africa Dempsey  [address]  [facility] [address]        Dear Colleague,    Thank you for referring your patient, Africa Dempsey, to the [facility]CER [facility]. Please see a copy of my visit note below.    Africa Dempsey is a 45 year old female who is being evaluated via a billable video visit.      The patient has been notified of following:     \"This video visit will be conducted via a call between you and your physician/provider. We have found that certain health care needs can be provided without the need for an in-person physical exam.  This service lets us provide the care you need with a video conversation.  If a prescription is necessary we can send it directly to your pharmacy.  If lab work is needed we can place an order for that and you can then stop by our lab to have the test done at a later time.    Video visits are billed at different rates depending on your insurance coverage.  Please reach out to your insurance provider with any questions.    If during the course of the call the physician/provider feels a video visit is not appropriate, you will not be charged for this service.\"    Patient has given verbal consent for Video visit? Yes  How would you like to obtain your AVS? MyChart  If you are dropped from the video visit, the video invite should be resent to: Text to cell phone: [phone]  Will anyone else be joining your video visit? No    Vitals - Patient Reported  Weight (Patient Reported): 71.7 kg (158 lb)  Pain Score: No Pain (0)    I have reviewed and updated patient's allergy and medication list.    Concerns: Questions - When to take the next chemo pill? Should Vimpat be causes swelling that lead to a seizure?  How long will the treatment last?  Refills: None    Pura Lyles CMA      Video-Visit Details  Type of service:  Video Visit    Video Start Time: 9:40 AM  Video End Time: 10:06 AM    Originating Location (pt. Location): " "Home    Distant Location (provider location):  [facility]     Platform used for Video Visit: Blanche Hung MD    __________________________________________________    NEURO-ONCOLOGY VISIT  Nov 16, 2020    CHIEF COMPLAINT: Ms. Africa Dempsey is a 45 year old right-handed woman with a left frontal glioblastoma (IDH wildtype, MGMT promoter unmethylated), diagnosed following resection on 10/1/2019. Following initial diagnosis, Africa deferred standard upfront chemoradiotherapy in favor of alternative/ holistic options. Unfortunately, imaging in 7/2020 showed local as well as distant disease progression. She was deemed a non-surgical candidate.     Africa completed chemoradiotherapy on 8/5/2020. She completed 1 cycle of adjuvant temozolomide when in  9/2020, she became more symptomatic and imaging demonstrated disease progression. Temozolomide was stopped and she was transitioned to second-line therapy.     She is currently managed on bevacizumab (nani) plus lomustine. Imaging in 11/2020 showed a positive treatment response.     I met with Africa and Jim () today for this follow-up visit.     HISTORY OF PRESENT ILLNESS  -Last week, Africa had a recent generalized seizure events with loss of consciousness lasting for 30 seconds; aura of an atypical taste in her month, saw a flash of light. Mild confusion afterwards. HR was ranging from 40-60. Tolerated dose increase of Vimpat to 150mg BID. No worsening in fatigue with dose increase; continued chronic fatigue, especially with tapering dexamethasone. Dexamethasone taper otherwise well tolerated, now on 6mg daily. Resolved oral thrush with use of magic mouth wash.   -Otherwise, Africa is feeling \"good\".   -No nausea. Good appetite. Less abdominal bloating, but still present. No real issue currently with constipation.   -Tolerating nani infusions well.  -Minimal to no headaches  -More cognitively intact with better word " finding. Speech is more articulate.   -Feeling stronger with better balance. Remaining active; walking about 10-30 minutes/ day.   -Mood is positive.   -No numbness.    -Less leg swelling; resent U/S negative for DVT. No SOB, but mild GORDON with more intense exercise.     REVIEW OF SYSTEMS  A comprehensive ROS negative except as in HPI.      MEDICATIONS   Current Outpatient Medications   Medication Sig Dispense Refill     dexamethasone (DECADRON) 2 MG tablet Take 1 tablet (2 mg) by mouth 3 times daily 60 tablet 0     lomustine (GLEOSTINE) 100 MG capsule Take 2 capsules of 100mg both on Day 1 of each cycle on empty stomach to decrease nausea. 2 capsule 0     pantoprazole (PROTONIX) 40 MG EC tablet        sennosides (SENOKOT) 8.6 MG tablet Take 1 tablet by mouth daily       Lacosamide (VIMPAT) 100 MG TABS tablet Take 1.5 tablets (150 mg) by mouth 2 times daily 60 tablet 5     ondansetron (ZOFRAN) 4 MG tablet Take 1 tablet (4 mg) by mouth At Bedtime 30 minutes prior to chemotherapy dosing and repeat every 8 hours as needed for nausea. (Patient not taking: Reported on 10/20/2020) 30 tablet 3     DRUG ALLERGIES   Allergies   Allergen Reactions     Amoxicillin Hives       ONCOLOGIC HISTORY  -9/2019 PRESENTATION: Progressively worsening headaches over the past 3 weeks   -9/30/2019 MR brain imaging demonstrated a left frontal multi-lobed rim-enhancing mass lesion with associated edema.   -10/1/2019 SURGERY: Craniotomy for mass resection, gross total.   PATHOLOGY: Glioblastoma; IDH1-R132H wildtype/ IDH1, IHD2 wildtype by next generation sequencing. TP53 mutated.  BRAF, PTEN not mutated. MGMT promoter unmethylated.  -10/28/2019 NEURO-ONC: Recommending chemoradiotherapy, however, Africa is opting for alternative/ holistic options.   -7/1/2020 MRB with enhancement around the resection cavity, increased in size with extension into the corpus callosum. There are at least 3 new lesions in the left cerebral hemisphere, distant from  the resection cavity.   -7/6/2020 NEURO-ONC: Clinically better on dexamethasone and Keppra. Wanting to initiate standard upfront treatment.   -7/16 - 8/5/2020 CHEMORADS: 40 Gy in 15 fractions with concurrent temozolomide.   -7/27/2020 NEURO-ONC: Continue chemoradiotherapy. Discussed Optune; waiting on this option.   -8/25/2020 SZ: Started Vimpat.   -8/31/2020 NEURO-ONC/ MRB/ CHEMO: Clinically overall stable. Started Vimpat, well tolerated. Dexamethasone 4mg daily. Imaging with concern for early disease progression. Initiating adjuvant temozolomide 150mg/m2 (280mg), cycle 1 to start on 9/2. Optune to be starting.   -9/22/2020 NEURO-ONC/ MRB/ CHEMO: Clinically worse with refractory headaches and fatigue. No seizures on Vimpat. Dexamethasone 4mg TID. Imaging with disease progression and increase perfusion. Stopping adjuvant temozolomide. Starting bevacizumab (sandra) + lomustine.  -9/25/2020 CHEMO: Lomustine, cycle 1.   -9/29/2020 CHEMO: Sandra, first infusion.   -10/20/2020 NEURO-ONC/ CHEMO: Clinically, much improved. Continue sandra. Tapering dexamethasone. Order of leg U/S.   -10/2020 U/S of legs negative for DVT.   -11/16/2020 NEURO-ONC/ MRB/ CHEMO: Clinically improving. Imaging with positive treatment effect. Continue sandra. Decrease dexamethasone to 4mg daily. Increased Vimpat in the setting of a recent recurrent generalized seizure. Plan to start cycle 2 of lomustine this week; pending repeat lab results.     SOCIAL HISTORY   Tobacco use: Never smoker.   Alcohol use: Social.   , 2 children.   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      PHYSICAL EXAMINATION  -Generally well appearing. Good energy.  -Respiratory: No audible wheezing.   -Skin: No facial rashes.  -Psychiatric: Normal mood and affect. Pleasant, talkative.  -Neurologic:   MENTAL STATUS:     Alert, oriented to date.    Recall: Largely intact with mild confusion.     Speech is largely fluent.    More articulate.    Comprehension with  mild impairments.     CRANIAL NERVES:     Pupils are equal, round, reactive to light.     Extraocular movements full, patient denies diplopia.     Visual fields appear full.    Right facial droop, much less.   Hearing intact.   With normal phonation, no dysfunction of the palate or tongue.   MOTOR: Antigravity in arms. Mild pronation in the right hand, but no drift.   SENSATION: Intact to light touch throughout.   COORDINATION: Intact bilaterally.     The rest of a comprehensive physical examination is deferred due to PHE (public health emergency) video visit restrictions.        MEDICAL RECORDS  Obtained and personally reviewed all available outside medical records in addition to reviewing any records available in our electronic system.     LABS  Personally reviewed all available lab results.     IMAGING  Personally reviewed MR brain imaging from last week and compared to prior imaging. To my eye, there is a reduction in contrast enhancement as well as T2 FLAIR. No bleed. No new lesions.     Imaging was shown to and results were reviewed with Africa and Jim.     Case discussed at Brain Tumor Conference today.       IMPRESSION  Clinic was spent discussing in detail the nature of her cancer in light of her recent imaging and current treatment plan. This was in addition to answering questions pertaining to my recommendations and devising the plan as outlined below.     Clinically, Africa continues to improve. She is experiences less severe headache, aphasia, and confusion. Her strength and balance is also improved. Dexamethasone dose is currently 2mg TID (6mg total daily) and Africa tolerated the recent taper well. Oral thrush and insomnia related to high-dose steroid use has also improved. Will decrease dose to 4mg daily.     Africa did have a breakthrough generalized seizure last week and has tolerated the increase in Vimpat well. U/S of legs from last month was without evidence of a DVT. With an accelerated bowel  regimen + enemas, bowel movements are now routine and abdominal bloating has greatly improved. It is critical that Africa avoids constipation while on nani over concern for the potential of bowel perforation.    The first dose of lomustine was well tolerated. Recent lab results from 11/10 at goal, will plan to repeat this week and if again at goal, can start cycle 2 this week. Then, Africa is to start repeating labs 4 weeks after starting chemotherapy. Imaging from last week with a very positive response to treatment. Repeat in 2 months. Continue nani every 2 weeks.     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     -STEROIDS-  -Dexamethasone taper;    -Decrease to 4mg daily.     -SEIZURE MANAGEMENT-  -History of seizures; generalized events.   -No driving for 3 months (mid-Feb 2021).  -Continue Vimpat 150mg BID.  -Did not tolerate Keppra.     -Quality of life/ MOOD/ FATIGUE-  -Denies any mood issues.  -Continue to monitor mood as untreated/ undertreated depression can worsen fatigue, dysorexia, and quality of life.    -LEG SWELLING-  -Continue to monitor.   -U/S of legs in 10/2020 was negative for DVT.     -BRADYCARDIA-  -HR in the 40's.   -Continue to follow with cardiology.     Return to clinic with me in 2 months + imaging + labs.    In the meantime, Africa knows to call with questions or concerns or to report new complaints and can be seen sooner if needed.    Shannen Hung MD  Neuro-oncology

## 2020-11-16 NOTE — TELEPHONE ENCOUNTER
Lab orders faxed to [facility] [phone]. Fax confirmation received.    Sara Melgar, RN, BSN  Neuro-Oncology Care Coordinator  [facility]

## 2020-11-16 NOTE — TUMOR CONFERENCE
Tumor Conference Information  Tumor Conference: Brain  Specialties Present: Medical oncology, Pathology, Radiology, Radiation oncology, Surgery  Patient Status: A current patient  Pathology: Not Discussed  Treatment to Date: Surgical intervention(s), Chemoradiation, Adjuvant chemotherapy, Palliative chemotherapy  Clinical Trial Eligibility: Not discussed  Recommended Plan: Continue with current treatment plan (Comment: reviewed imaging - stable improvement; continue with current treatment plan and repeat imaging with follow up in 2 months)  Did the review exceed 30 minutes?: did not           Documentation / Disclaimer Cancer Tumor Board Note  Cancer tumor board recommendations do not override what is determined to be reasonable care and treatment, which is dependent on the circumstances of a patient's case; the patient's medical, social, and personal concerns; and the clinical judgment of the oncologist [physician].

## 2020-11-19 NOTE — TELEPHONE ENCOUNTER
Oral Chemotherapy Monitoring Program     Placed call to patient in follow up of date for next dose of Gleostine.  Spoke with the patient's , Jim, who states the patient should receive the Gleostine in the mail today, and is planning on taking it tonight, 11/19/20.      Advised Jim to let us know via Infogram if this plan changes.    Grace Myhre, PharmD  Hematology/Oncology Clinical Pharmacist  [facility]  [phone]

## 2020-11-24 NOTE — PROGRESS NOTES
Infusion Nursing Note:  Africa Dempsey presents today for C2D15 MVASI.    Patient seen by provider today: No   present during visit today: Not Applicable.    Note: N/A.    Intravenous Access:  Peripheral IV placed.    Treatment Conditions:  Results reviewed, labs MET treatment parameters, ok to proceed with treatment.      Post Infusion Assessment:  Patient tolerated infusion without incident.  Blood return noted pre and post infusion.  Site patent and intact, free from redness, edema or discomfort.  No evidence of extravasations.  Access discontinued per protocol.       Discharge Plan:   Patient discharged in stable condition accompanied by: self.  Departure Mode: Ambulatory;  is her .    Loren Guido RN

## 2020-12-08 NOTE — PROGRESS NOTES
Infusion Nursing Note:  Africa Dempsey presents today for MVASI C2D29    Patient seen by provider today: No   present during visit today: Not Applicable.    Note: N/A.    Intravenous Access:  Peripheral IV placed.    Treatment Conditions:  Lab Results   Component Value Date    HGB 14.5 12/08/2020     Lab Results   Component Value Date    WBC 8.5 12/08/2020      Lab Results   Component Value Date    ANEU 7.1 12/08/2020     Lab Results   Component Value Date     12/08/2020      Lab Results   Component Value Date     12/08/2020                   Lab Results   Component Value Date    POTASSIUM 4.8 12/08/2020           Lab Results   Component Value Date    MAG 2.2 07/03/2020            Lab Results   Component Value Date    CR 0.65 12/08/2020                   Lab Results   Component Value Date    HANH 9.6 12/08/2020                Lab Results   Component Value Date    BILITOTAL 0.2 12/08/2020           Lab Results   Component Value Date    ALBUMIN 3.5 12/08/2020                    Lab Results   Component Value Date    ALT 42 12/08/2020           Lab Results   Component Value Date    AST 17 12/08/2020   Results reviewed, labs MET treatment parameters, ok to proceed with treatment.    Post Infusion Assessment:  Patient tolerated infusion without incident.  Site patent and intact, free from redness, edema or discomfort.  No evidence of extravasations.  Access discontinued per protocol.     Discharge Plan:   Discharge instructions reviewed with: Patient.  Patient and/or family verbalized understanding of discharge instructions and all questions answered.  AVS to patient via [employer].  Patient will return 12/22/2020 for next appointment.   Patient discharged in stable condition accompanied by: self.  Departure Mode: Ambulatory.    Kemi Gill RN

## 2020-12-22 NOTE — PROGRESS NOTES
Infusion Nursing Note:  Africa Dempsey presents today for C3D1 MVASI.    Patient seen by provider today: No   present during visit today: Not Applicable.    Note: N/A.    Intravenous Access:  Peripheral IV placed.    Treatment Conditions:  Results reviewed, labs MET treatment parameters, ok to proceed with treatment.      Post Infusion Assessment:  Patient tolerated infusion without incident.  Blood return noted pre and post infusion.  Site patent and intact, free from redness, edema or discomfort.  No evidence of extravasations.  Access discontinued per protocol.       Discharge Plan:   Patient discharged in stable condition accompanied by: self.  Departure Mode: Ambulatory.    Loren Guido RN

## 2021-01-01 ENCOUNTER — TELEPHONE (OUTPATIENT)
Dept: ONCOLOGY | Facility: CLINIC | Age: 46
End: 2021-01-01

## 2021-01-01 ENCOUNTER — INFUSION THERAPY VISIT (OUTPATIENT)
Dept: INFUSION THERAPY | Facility: CLINIC | Age: 46
End: 2021-01-01
Attending: PSYCHIATRY & NEUROLOGY
Payer: COMMERCIAL

## 2021-01-01 ENCOUNTER — TELEPHONE (OUTPATIENT)
Dept: RADIATION THERAPY | Facility: OUTPATIENT CENTER | Age: 46
End: 2021-01-01

## 2021-01-01 ENCOUNTER — VIRTUAL VISIT (OUTPATIENT)
Dept: RADIATION THERAPY | Facility: OUTPATIENT CENTER | Age: 46
End: 2021-01-01
Payer: COMMERCIAL

## 2021-01-01 ENCOUNTER — HOSPITAL ENCOUNTER (OUTPATIENT)
Dept: LAB | Facility: CLINIC | Age: 46
Discharge: HOME | End: 2021-01-05
Attending: PSYCHIATRY & NEUROLOGY | Admitting: PSYCHIATRY & NEUROLOGY
Payer: COMMERCIAL

## 2021-01-01 ENCOUNTER — HOSPITAL ENCOUNTER (OUTPATIENT)
Dept: LAB | Facility: CLINIC | Age: 46
Discharge: HOME | End: 2021-02-16
Attending: PSYCHIATRY & NEUROLOGY | Admitting: PSYCHIATRY & NEUROLOGY
Payer: COMMERCIAL

## 2021-01-01 ENCOUNTER — VIRTUAL VISIT (OUTPATIENT)
Dept: ONCOLOGY | Facility: CLINIC | Age: 46
End: 2021-01-01
Attending: PSYCHIATRY & NEUROLOGY
Payer: COMMERCIAL

## 2021-01-01 ENCOUNTER — HEALTH MAINTENANCE LETTER (OUTPATIENT)
Age: 46
End: 2021-01-01

## 2021-01-01 ENCOUNTER — DOCUMENTATION ONLY (OUTPATIENT)
Dept: ONCOLOGY | Facility: CLINIC | Age: 46
End: 2021-01-01

## 2021-01-01 ENCOUNTER — HOSPITAL ENCOUNTER (OUTPATIENT)
Dept: LAB | Facility: CLINIC | Age: 46
Discharge: HOME | End: 2021-03-16
Attending: PSYCHIATRY & NEUROLOGY | Admitting: PSYCHIATRY & NEUROLOGY
Payer: COMMERCIAL

## 2021-01-01 ENCOUNTER — HOSPITAL ENCOUNTER (OUTPATIENT)
Dept: LAB | Facility: CLINIC | Age: 46
Discharge: HOME | End: 2021-03-02
Attending: PSYCHIATRY & NEUROLOGY | Admitting: PSYCHIATRY & NEUROLOGY
Payer: COMMERCIAL

## 2021-01-01 ENCOUNTER — NURSE TRIAGE (OUTPATIENT)
Dept: NURSING | Facility: CLINIC | Age: 46
End: 2021-01-01

## 2021-01-01 ENCOUNTER — MYC MEDICAL ADVICE (OUTPATIENT)
Dept: ONCOLOGY | Facility: CLINIC | Age: 46
End: 2021-01-01

## 2021-01-01 ENCOUNTER — HOSPITAL ENCOUNTER (OUTPATIENT)
Dept: MRI IMAGING | Facility: CLINIC | Age: 46
Discharge: HOME | End: 2021-03-19
Attending: PSYCHIATRY & NEUROLOGY | Admitting: PSYCHIATRY & NEUROLOGY
Payer: COMMERCIAL

## 2021-01-01 ENCOUNTER — HOSPITAL ENCOUNTER (OUTPATIENT)
Dept: LAB | Facility: CLINIC | Age: 46
Discharge: HOME | End: 2021-02-02
Attending: PSYCHIATRY & NEUROLOGY | Admitting: PSYCHIATRY & NEUROLOGY
Payer: COMMERCIAL

## 2021-01-01 ENCOUNTER — HOSPITAL ENCOUNTER (OUTPATIENT)
Dept: MRI IMAGING | Facility: CLINIC | Age: 46
Discharge: HOME | End: 2021-01-15
Attending: PSYCHIATRY & NEUROLOGY | Admitting: PSYCHIATRY & NEUROLOGY
Payer: COMMERCIAL

## 2021-01-01 ENCOUNTER — TUMOR CONFERENCE (OUTPATIENT)
Dept: ONCOLOGY | Facility: CLINIC | Age: 46
End: 2021-01-01
Payer: COMMERCIAL

## 2021-01-01 VITALS
HEART RATE: 69 BPM | TEMPERATURE: 95.5 F | SYSTOLIC BLOOD PRESSURE: 131 MMHG | DIASTOLIC BLOOD PRESSURE: 84 MMHG | RESPIRATION RATE: 18 BRPM

## 2021-01-01 VITALS
TEMPERATURE: 95.4 F | RESPIRATION RATE: 16 BRPM | HEART RATE: 60 BPM | SYSTOLIC BLOOD PRESSURE: 122 MMHG | WEIGHT: 171.2 LBS | BODY MASS INDEX: 26.81 KG/M2 | DIASTOLIC BLOOD PRESSURE: 82 MMHG | OXYGEN SATURATION: 96 %

## 2021-01-01 VITALS
HEART RATE: 63 BPM | DIASTOLIC BLOOD PRESSURE: 80 MMHG | RESPIRATION RATE: 18 BRPM | TEMPERATURE: 96.5 F | SYSTOLIC BLOOD PRESSURE: 118 MMHG

## 2021-01-01 VITALS
HEART RATE: 63 BPM | TEMPERATURE: 96.1 F | SYSTOLIC BLOOD PRESSURE: 129 MMHG | DIASTOLIC BLOOD PRESSURE: 84 MMHG | RESPIRATION RATE: 18 BRPM

## 2021-01-01 VITALS — HEART RATE: 66 BPM | DIASTOLIC BLOOD PRESSURE: 81 MMHG | TEMPERATURE: 95.4 F | SYSTOLIC BLOOD PRESSURE: 135 MMHG

## 2021-01-01 VITALS — HEART RATE: 81 BPM | SYSTOLIC BLOOD PRESSURE: 140 MMHG | DIASTOLIC BLOOD PRESSURE: 93 MMHG

## 2021-01-01 DIAGNOSIS — C71.9 GLIOBLASTOMA (H): Primary | ICD-10-CM

## 2021-01-01 DIAGNOSIS — Z79.899 ENCOUNTER FOR LONG-TERM (CURRENT) USE OF MEDICATIONS: ICD-10-CM

## 2021-01-01 DIAGNOSIS — G81.91 HEMIPARESIS OF RIGHT DOMINANT SIDE DUE TO NON-CEREBROVASCULAR ETIOLOGY (H): ICD-10-CM

## 2021-01-01 DIAGNOSIS — M62.81 MUSCLE WEAKNESS (GENERALIZED): Primary | ICD-10-CM

## 2021-01-01 DIAGNOSIS — C71.9 GLIOBLASTOMA (H): ICD-10-CM

## 2021-01-01 DIAGNOSIS — G40.909 SEIZURE DISORDER (H): ICD-10-CM

## 2021-01-01 DIAGNOSIS — D49.6 BRAIN TUMOR (H): ICD-10-CM

## 2021-01-01 DIAGNOSIS — Z79.01 LONG TERM CURRENT USE OF ANTICOAGULANT THERAPY: ICD-10-CM

## 2021-01-01 DIAGNOSIS — R26.81 GAIT INSTABILITY: ICD-10-CM

## 2021-01-01 DIAGNOSIS — I82.409 DVT (DEEP VENOUS THROMBOSIS) (H): Primary | ICD-10-CM

## 2021-01-01 DIAGNOSIS — D49.6 BRAIN TUMOR (H): Primary | ICD-10-CM

## 2021-01-01 LAB
ALBUMIN SERPL-MCNC: 3.6 G/DL (ref 3.4–5)
ALBUMIN SERPL-MCNC: 3.7 G/DL (ref 3.4–5)
ALBUMIN SERPL-MCNC: 3.9 G/DL (ref 3.4–5)
ALP SERPL-CCNC: 71 U/L (ref 40–150)
ALP SERPL-CCNC: 73 U/L (ref 40–150)
ALP SERPL-CCNC: 78 U/L (ref 40–150)
ALP SERPL-CCNC: 86 U/L (ref 40–150)
ALP SERPL-CCNC: 88 U/L (ref 40–150)
ALT SERPL W P-5'-P-CCNC: 35 U/L (ref 0–50)
ALT SERPL W P-5'-P-CCNC: 37 U/L (ref 0–50)
ALT SERPL W P-5'-P-CCNC: 42 U/L (ref 0–50)
ALT SERPL W P-5'-P-CCNC: 43 U/L (ref 0–50)
ALT SERPL W P-5'-P-CCNC: 49 U/L (ref 0–50)
ANION GAP SERPL CALCULATED.3IONS-SCNC: 1 MMOL/L (ref 3–14)
ANION GAP SERPL CALCULATED.3IONS-SCNC: 2 MMOL/L (ref 3–14)
ANION GAP SERPL CALCULATED.3IONS-SCNC: 2 MMOL/L (ref 3–14)
ANION GAP SERPL CALCULATED.3IONS-SCNC: 5 MMOL/L (ref 3–14)
ANION GAP SERPL CALCULATED.3IONS-SCNC: 6 MMOL/L (ref 3–14)
AST SERPL W P-5'-P-CCNC: 14 U/L (ref 0–45)
AST SERPL W P-5'-P-CCNC: 19 U/L (ref 0–45)
AST SERPL W P-5'-P-CCNC: 19 U/L (ref 0–45)
AST SERPL W P-5'-P-CCNC: 23 U/L (ref 0–45)
AST SERPL W P-5'-P-CCNC: 24 U/L (ref 0–45)
BASOPHILS # BLD AUTO: 0 10E9/L (ref 0–0.2)
BASOPHILS NFR BLD AUTO: 0.2 %
BASOPHILS NFR BLD AUTO: 0.2 %
BASOPHILS NFR BLD AUTO: 0.3 %
BASOPHILS NFR BLD AUTO: 0.4 %
BILIRUB SERPL-MCNC: 0.2 MG/DL (ref 0.2–1.3)
BILIRUB SERPL-MCNC: 0.3 MG/DL (ref 0.2–1.3)
BILIRUB SERPL-MCNC: 0.4 MG/DL (ref 0.2–1.3)
BUN SERPL-MCNC: 11 MG/DL (ref 7–30)
BUN SERPL-MCNC: 12 MG/DL (ref 7–30)
BUN SERPL-MCNC: 14 MG/DL (ref 7–30)
BUN SERPL-MCNC: 19 MG/DL (ref 7–30)
BUN SERPL-MCNC: 21 MG/DL (ref 7–30)
CALCIUM SERPL-MCNC: 10 MG/DL (ref 8.5–10.1)
CALCIUM SERPL-MCNC: 10 MG/DL (ref 8.5–10.1)
CALCIUM SERPL-MCNC: 10.1 MG/DL (ref 8.5–10.1)
CALCIUM SERPL-MCNC: 10.2 MG/DL (ref 8.5–10.1)
CALCIUM SERPL-MCNC: 10.5 MG/DL (ref 8.5–10.1)
CHLORIDE SERPL-SCNC: 104 MMOL/L (ref 94–109)
CHLORIDE SERPL-SCNC: 106 MMOL/L (ref 94–109)
CHLORIDE SERPL-SCNC: 107 MMOL/L (ref 94–109)
CHLORIDE SERPL-SCNC: 109 MMOL/L (ref 94–109)
CHLORIDE SERPL-SCNC: 109 MMOL/L (ref 94–109)
CO2 SERPL-SCNC: 27 MMOL/L (ref 20–32)
CO2 SERPL-SCNC: 28 MMOL/L (ref 20–32)
CO2 SERPL-SCNC: 28 MMOL/L (ref 20–32)
CO2 SERPL-SCNC: 30 MMOL/L (ref 20–32)
CO2 SERPL-SCNC: 31 MMOL/L (ref 20–32)
CREAT SERPL-MCNC: 0.66 MG/DL (ref 0.52–1.04)
CREAT SERPL-MCNC: 0.69 MG/DL (ref 0.52–1.04)
CREAT SERPL-MCNC: 0.72 MG/DL (ref 0.52–1.04)
CREAT SERPL-MCNC: 0.73 MG/DL (ref 0.52–1.04)
CREAT SERPL-MCNC: 0.82 MG/DL (ref 0.52–1.04)
DIFFERENTIAL METHOD BLD: ABNORMAL
EOSINOPHIL # BLD AUTO: 0 10E9/L (ref 0–0.7)
EOSINOPHIL NFR BLD AUTO: 0.1 %
EOSINOPHIL NFR BLD AUTO: 0.1 %
EOSINOPHIL NFR BLD AUTO: 0.2 %
EOSINOPHIL NFR BLD AUTO: 0.3 %
ERYTHROCYTE [DISTWIDTH] IN BLOOD BY AUTOMATED COUNT: 13.2 % (ref 10–15)
ERYTHROCYTE [DISTWIDTH] IN BLOOD BY AUTOMATED COUNT: 13.4 % (ref 10–15)
ERYTHROCYTE [DISTWIDTH] IN BLOOD BY AUTOMATED COUNT: 13.4 % (ref 10–15)
ERYTHROCYTE [DISTWIDTH] IN BLOOD BY AUTOMATED COUNT: 13.5 % (ref 10–15)
ERYTHROCYTE [DISTWIDTH] IN BLOOD BY AUTOMATED COUNT: 14.3 % (ref 10–15)
ERYTHROCYTE [DISTWIDTH] IN BLOOD BY AUTOMATED COUNT: 15.1 % (ref 10–15)
GFR SERPL CREATININE-BSD FRML MDRD: 86 ML/MIN/{1.73_M2}
GFR SERPL CREATININE-BSD FRML MDRD: >90 ML/MIN/{1.73_M2}
GLUCOSE SERPL-MCNC: 106 MG/DL (ref 70–99)
GLUCOSE SERPL-MCNC: 106 MG/DL (ref 70–99)
GLUCOSE SERPL-MCNC: 109 MG/DL (ref 70–99)
GLUCOSE SERPL-MCNC: 120 MG/DL (ref 70–99)
GLUCOSE SERPL-MCNC: 155 MG/DL (ref 70–99)
HCT VFR BLD AUTO: 40.1 % (ref 35–47)
HCT VFR BLD AUTO: 42.6 % (ref 35–47)
HCT VFR BLD AUTO: 43.4 % (ref 35–47)
HCT VFR BLD AUTO: 43.7 % (ref 35–47)
HCT VFR BLD AUTO: 45.4 % (ref 35–47)
HCT VFR BLD AUTO: 45.5 % (ref 35–47)
HGB BLD-MCNC: 13.6 G/DL (ref 11.7–15.7)
HGB BLD-MCNC: 14.3 G/DL (ref 11.7–15.7)
HGB BLD-MCNC: 14.5 G/DL (ref 11.7–15.7)
HGB BLD-MCNC: 14.7 G/DL (ref 11.7–15.7)
HGB BLD-MCNC: 15 G/DL (ref 11.7–15.7)
HGB BLD-MCNC: 15.1 G/DL (ref 11.7–15.7)
IMM GRANULOCYTES # BLD: 0 10E9/L (ref 0–0.4)
IMM GRANULOCYTES # BLD: 0 10E9/L (ref 0–0.4)
IMM GRANULOCYTES # BLD: 0.1 10E9/L (ref 0–0.4)
IMM GRANULOCYTES NFR BLD: 0.2 %
IMM GRANULOCYTES NFR BLD: 0.3 %
IMM GRANULOCYTES NFR BLD: 1 %
IMM GRANULOCYTES NFR BLD: 1 %
IMM GRANULOCYTES NFR BLD: 1.3 %
LYMPHOCYTES # BLD AUTO: 0.5 10E9/L (ref 0.8–5.3)
LYMPHOCYTES # BLD AUTO: 0.5 10E9/L (ref 0.8–5.3)
LYMPHOCYTES # BLD AUTO: 0.6 10E9/L (ref 0.8–5.3)
LYMPHOCYTES # BLD AUTO: 0.7 10E9/L (ref 0.8–5.3)
LYMPHOCYTES # BLD AUTO: 0.9 10E9/L (ref 0.8–5.3)
LYMPHOCYTES # BLD AUTO: 0.9 10E9/L (ref 0.8–5.3)
LYMPHOCYTES NFR BLD AUTO: 14.3 %
LYMPHOCYTES NFR BLD AUTO: 16.3 %
LYMPHOCYTES NFR BLD AUTO: 5.5 %
LYMPHOCYTES NFR BLD AUTO: 8.3 %
LYMPHOCYTES NFR BLD AUTO: 9.4 %
LYMPHOCYTES NFR BLD AUTO: 9.9 %
MCH RBC QN AUTO: 33.8 PG (ref 26.5–33)
MCH RBC QN AUTO: 33.8 PG (ref 26.5–33)
MCH RBC QN AUTO: 34 PG (ref 26.5–33)
MCH RBC QN AUTO: 34.1 PG (ref 26.5–33)
MCH RBC QN AUTO: 34.2 PG (ref 26.5–33)
MCH RBC QN AUTO: 34.2 PG (ref 26.5–33)
MCHC RBC AUTO-ENTMCNC: 32.9 G/DL (ref 31.5–36.5)
MCHC RBC AUTO-ENTMCNC: 33 G/DL (ref 31.5–36.5)
MCHC RBC AUTO-ENTMCNC: 33.2 G/DL (ref 31.5–36.5)
MCHC RBC AUTO-ENTMCNC: 33.6 G/DL (ref 31.5–36.5)
MCHC RBC AUTO-ENTMCNC: 33.9 G/DL (ref 31.5–36.5)
MCHC RBC AUTO-ENTMCNC: 34 G/DL (ref 31.5–36.5)
MCV RBC AUTO: 100 FL (ref 78–100)
MCV RBC AUTO: 101 FL (ref 78–100)
MCV RBC AUTO: 101 FL (ref 78–100)
MCV RBC AUTO: 102 FL (ref 78–100)
MCV RBC AUTO: 103 FL (ref 78–100)
MCV RBC AUTO: 104 FL (ref 78–100)
MONOCYTES # BLD AUTO: 0.2 10E9/L (ref 0–1.3)
MONOCYTES # BLD AUTO: 0.3 10E9/L (ref 0–1.3)
MONOCYTES # BLD AUTO: 0.4 10E9/L (ref 0–1.3)
MONOCYTES # BLD AUTO: 0.4 10E9/L (ref 0–1.3)
MONOCYTES # BLD AUTO: 0.5 10E9/L (ref 0–1.3)
MONOCYTES # BLD AUTO: 0.6 10E9/L (ref 0–1.3)
MONOCYTES NFR BLD AUTO: 3.3 %
MONOCYTES NFR BLD AUTO: 5.4 %
MONOCYTES NFR BLD AUTO: 5.8 %
MONOCYTES NFR BLD AUTO: 6 %
MONOCYTES NFR BLD AUTO: 7.2 %
MONOCYTES NFR BLD AUTO: 7.8 %
NEUTROPHILS # BLD AUTO: 3.6 10E9/L (ref 1.6–8.3)
NEUTROPHILS # BLD AUTO: 4 10E9/L (ref 1.6–8.3)
NEUTROPHILS # BLD AUTO: 4.1 10E9/L (ref 1.6–8.3)
NEUTROPHILS # BLD AUTO: 5.7 10E9/L (ref 1.6–8.3)
NEUTROPHILS # BLD AUTO: 7.3 10E9/L (ref 1.6–8.3)
NEUTROPHILS # BLD AUTO: 7.9 10E9/L (ref 1.6–8.3)
NEUTROPHILS NFR BLD AUTO: 76 %
NEUTROPHILS NFR BLD AUTO: 76.3 %
NEUTROPHILS NFR BLD AUTO: 82.4 %
NEUTROPHILS NFR BLD AUTO: 85 %
NEUTROPHILS NFR BLD AUTO: 86.5 %
NEUTROPHILS NFR BLD AUTO: 87.8 %
NRBC # BLD AUTO: 0 10*3/UL
NRBC BLD AUTO-RTO: 0 /100
PLATELET # BLD AUTO: 170 10E9/L (ref 150–450)
PLATELET # BLD AUTO: 177 10E9/L (ref 150–450)
PLATELET # BLD AUTO: 196 10E9/L (ref 150–450)
PLATELET # BLD AUTO: 221 10E9/L (ref 150–450)
PLATELET # BLD AUTO: 89 10E9/L (ref 150–450)
PLATELET # BLD AUTO: 98 10E9/L (ref 150–450)
POTASSIUM SERPL-SCNC: 4.4 MMOL/L (ref 3.4–5.3)
POTASSIUM SERPL-SCNC: 4.5 MMOL/L (ref 3.4–5.3)
POTASSIUM SERPL-SCNC: 5 MMOL/L (ref 3.4–5.3)
PROT SERPL-MCNC: 7 G/DL (ref 6.8–8.8)
PROT SERPL-MCNC: 7.3 G/DL (ref 6.8–8.8)
PROT SERPL-MCNC: 7.4 G/DL (ref 6.8–8.8)
PROT SERPL-MCNC: 7.5 G/DL (ref 6.8–8.8)
PROT SERPL-MCNC: 7.5 G/DL (ref 6.8–8.8)
RBC # BLD AUTO: 3.98 10E12/L (ref 3.8–5.2)
RBC # BLD AUTO: 4.2 10E12/L (ref 3.8–5.2)
RBC # BLD AUTO: 4.25 10E12/L (ref 3.8–5.2)
RBC # BLD AUTO: 4.35 10E12/L (ref 3.8–5.2)
RBC # BLD AUTO: 4.38 10E12/L (ref 3.8–5.2)
RBC # BLD AUTO: 4.47 10E12/L (ref 3.8–5.2)
SODIUM SERPL-SCNC: 136 MMOL/L (ref 133–144)
SODIUM SERPL-SCNC: 139 MMOL/L (ref 133–144)
SODIUM SERPL-SCNC: 139 MMOL/L (ref 133–144)
SODIUM SERPL-SCNC: 140 MMOL/L (ref 133–144)
SODIUM SERPL-SCNC: 141 MMOL/L (ref 133–144)
WBC # BLD AUTO: 4.8 10E9/L (ref 4–11)
WBC # BLD AUTO: 4.8 10E9/L (ref 4–11)
WBC # BLD AUTO: 5.2 10E9/L (ref 4–11)
WBC # BLD AUTO: 6.7 10E9/L (ref 4–11)
WBC # BLD AUTO: 8.9 10E9/L (ref 4–11)
WBC # BLD AUTO: 9 10E9/L (ref 4–11)

## 2021-01-01 PROCEDURE — 258N000003 HC RX IP 258 OP 636: Performed by: PSYCHIATRY & NEUROLOGY

## 2021-01-01 PROCEDURE — A9585 GADOBUTROL INJECTION: HCPCS | Performed by: PSYCHIATRY & NEUROLOGY

## 2021-01-01 PROCEDURE — 96365 THER/PROPH/DIAG IV INF INIT: CPT

## 2021-01-01 PROCEDURE — 85025 COMPLETE CBC W/AUTO DIFF WBC: CPT | Performed by: PSYCHIATRY & NEUROLOGY

## 2021-01-01 PROCEDURE — 36415 COLL VENOUS BLD VENIPUNCTURE: CPT | Performed by: PSYCHIATRY & NEUROLOGY

## 2021-01-01 PROCEDURE — 99215 OFFICE O/P EST HI 40 MIN: CPT | Mod: 95 | Performed by: PSYCHIATRY & NEUROLOGY

## 2021-01-01 PROCEDURE — 80053 COMPREHEN METABOLIC PANEL: CPT | Performed by: PSYCHIATRY & NEUROLOGY

## 2021-01-01 PROCEDURE — 255N000002 HC RX 255 OP 636: Performed by: PSYCHIATRY & NEUROLOGY

## 2021-01-01 PROCEDURE — 99417 PROLNG OP E/M EACH 15 MIN: CPT | Mod: 95 | Performed by: PSYCHIATRY & NEUROLOGY

## 2021-01-01 PROCEDURE — 250N000011 HC RX IP 250 OP 636: Performed by: PSYCHIATRY & NEUROLOGY

## 2021-01-01 PROCEDURE — 70553 MRI BRAIN STEM W/O & W/DYE: CPT

## 2021-01-01 PROCEDURE — 96413 CHEMO IV INFUSION 1 HR: CPT

## 2021-01-01 PROCEDURE — 999N001193 HC VIDEO/TELEPHONE VISIT; NO CHARGE

## 2021-01-01 RX ORDER — MEPERIDINE HYDROCHLORIDE 25 MG/ML
25 INJECTION INTRAMUSCULAR; INTRAVENOUS; SUBCUTANEOUS EVERY 30 MIN PRN
Status: CANCELLED | OUTPATIENT
Start: 2021-01-01

## 2021-01-01 RX ORDER — SODIUM CHLORIDE 9 MG/ML
1000 INJECTION, SOLUTION INTRAVENOUS CONTINUOUS PRN
Status: CANCELLED
Start: 2021-01-01

## 2021-01-01 RX ORDER — ALBUTEROL SULFATE 0.83 MG/ML
2.5 SOLUTION RESPIRATORY (INHALATION)
Status: CANCELLED | OUTPATIENT
Start: 2021-01-01

## 2021-01-01 RX ORDER — EPINEPHRINE 1 MG/ML
0.3 INJECTION, SOLUTION INTRAMUSCULAR; SUBCUTANEOUS EVERY 5 MIN PRN
Status: CANCELLED | OUTPATIENT
Start: 2021-01-01

## 2021-01-01 RX ORDER — LORAZEPAM 2 MG/ML
0.5 INJECTION INTRAMUSCULAR EVERY 4 HOURS PRN
Status: CANCELLED
Start: 2021-01-01

## 2021-01-01 RX ORDER — NALOXONE HYDROCHLORIDE 0.4 MG/ML
.1-.4 INJECTION, SOLUTION INTRAMUSCULAR; INTRAVENOUS; SUBCUTANEOUS
Status: CANCELLED | OUTPATIENT
Start: 2021-01-01

## 2021-01-01 RX ORDER — DEXAMETHASONE 4 MG/1
6 TABLET ORAL
COMMUNITY
Start: 2020-01-01 | End: 2021-01-01 | Stop reason: DRUGHIGH

## 2021-01-01 RX ORDER — DEXAMETHASONE 2 MG/1
4 TABLET ORAL
Qty: 120 TABLET | Refills: 3 | Status: SHIPPED | OUTPATIENT
Start: 2021-01-01 | End: 2021-01-01

## 2021-01-01 RX ORDER — METHYLPREDNISOLONE SODIUM SUCCINATE 125 MG/2ML
125 INJECTION, POWDER, LYOPHILIZED, FOR SOLUTION INTRAMUSCULAR; INTRAVENOUS
Status: CANCELLED
Start: 2021-01-01

## 2021-01-01 RX ORDER — ALBUTEROL SULFATE 90 UG/1
1-2 AEROSOL, METERED RESPIRATORY (INHALATION)
Status: CANCELLED
Start: 2021-01-01

## 2021-01-01 RX ORDER — DIPHENHYDRAMINE HYDROCHLORIDE 50 MG/ML
50 INJECTION INTRAMUSCULAR; INTRAVENOUS
Status: CANCELLED
Start: 2021-01-01

## 2021-01-01 RX ORDER — DEXAMETHASONE 4 MG/1
TABLET ORAL
Qty: 60 TABLET | Refills: 3 | Status: SHIPPED | OUTPATIENT
Start: 2021-01-01

## 2021-01-01 RX ORDER — GADOBUTROL 604.72 MG/ML
7 INJECTION INTRAVENOUS ONCE
Status: COMPLETED | OUTPATIENT
Start: 2021-01-01 | End: 2021-01-01

## 2021-01-01 RX ORDER — LACOSAMIDE 100 MG/1
150 TABLET ORAL 2 TIMES DAILY
Qty: 180 TABLET | Refills: 3 | Status: SHIPPED | OUTPATIENT
Start: 2021-01-01

## 2021-01-01 RX ORDER — GADOBUTROL 604.72 MG/ML
7.5 INJECTION INTRAVENOUS ONCE
Status: COMPLETED | OUTPATIENT
Start: 2021-01-01 | End: 2021-01-01

## 2021-01-01 RX ORDER — DEXAMETHASONE 2 MG/1
4 TABLET ORAL
Qty: 60 TABLET | Refills: 1 | Status: SHIPPED | OUTPATIENT
Start: 2021-01-01 | End: 2021-01-01

## 2021-01-01 RX ADMIN — BEVACIZUMAB-AWWB 800 MG: 400 INJECTION, SOLUTION INTRAVENOUS at 13:14

## 2021-01-01 RX ADMIN — BEVACIZUMAB-AWWB 700 MG: 400 INJECTION, SOLUTION INTRAVENOUS at 13:05

## 2021-01-01 RX ADMIN — GADOBUTROL 7.5 ML: 604.72 INJECTION INTRAVENOUS at 12:43

## 2021-01-01 RX ADMIN — BEVACIZUMAB-AWWB 800 MG: 400 INJECTION, SOLUTION INTRAVENOUS at 13:21

## 2021-01-01 RX ADMIN — BEVACIZUMAB-AWWB 800 MG: 400 INJECTION, SOLUTION INTRAVENOUS at 13:29

## 2021-01-01 RX ADMIN — BEVACIZUMAB-AWWB 800 MG: 400 INJECTION, SOLUTION INTRAVENOUS at 13:47

## 2021-01-01 RX ADMIN — SODIUM CHLORIDE 250 ML: 9 INJECTION, SOLUTION INTRAVENOUS at 13:21

## 2021-01-01 RX ADMIN — GADOBUTROL 7 ML: 604.72 INJECTION INTRAVENOUS at 10:05

## 2021-01-01 RX ADMIN — BEVACIZUMAB-AWWB 700 MG: 400 INJECTION, SOLUTION INTRAVENOUS at 13:21

## 2021-01-01 ASSESSMENT — PAIN SCALES - GENERAL: PAINLEVEL: NO PAIN (0)

## 2021-01-05 NOTE — PROGRESS NOTES
Infusion Nursing Note:  Africa Dempsey presents today for c3d15 MVASI.    Patient seen by provider today: No   present during visit today: Not Applicable.    Note: N/A.    Intravenous Access:  Peripheral IV placed.    Treatment Conditions:  Lab Results   Component Value Date    HGB 13.6 01/05/2021     Lab Results   Component Value Date    WBC 5.2 01/05/2021      Lab Results   Component Value Date    ANEU 4.0 01/05/2021     Lab Results   Component Value Date     01/05/2021      Lab Results   Component Value Date     01/05/2021                   Lab Results   Component Value Date    POTASSIUM 4.5 01/05/2021           Lab Results   Component Value Date    MAG 2.2 07/03/2020            Lab Results   Component Value Date    CR 0.66 01/05/2021                   Lab Results   Component Value Date    HANH 10.0 01/05/2021                Lab Results   Component Value Date    BILITOTAL 0.2 01/05/2021           Lab Results   Component Value Date    ALBUMIN 3.7 01/05/2021                    Lab Results   Component Value Date    ALT 37 01/05/2021           Lab Results   Component Value Date    AST 24 01/05/2021       Results reviewed, labs MET treatment parameters, ok to proceed with treatment.      Post Infusion Assessment:  Patient tolerated infusion without incident.  Blood return noted pre and post infusion.  Site patent and intact, free from redness, edema or discomfort.  No evidence of extravasations.  Access discontinued per protocol.       Discharge Plan:   Discharge instructions reviewed with: Patient.  Patient and/or family verbalized understanding of discharge instructions and all questions answered.  Copy of AVS reviewed with patient and/or family.  Patient will return 1/15/21 for next appointment.  Patient discharged in stable condition accompanied by: self.  Departure Mode: Ambulatory.    Raimundo Darling RN

## 2021-01-11 NOTE — TELEPHONE ENCOUNTER
"Called for Dexamethasone refill. Currently taking 2mg tablets 2xdaily and if needed takes the third dose. Only a few tablets left. Needs prescription today if possible       \"Since Wed 12/30 has had increase episodes of \"a wave coming over body\", Heart beat goes all over the place. Last few minutes 70bpm, goes to 40bpm, Occurring 4xdaily now. Appt scheduled for this Friday 1/15 and will speak to Dr. Hung about it. Want to review MRI first and will discuss at Friday's appointment.\"    Caregiver denies that patient is having fevers/chills, cough, sore throat, congestion, SOB, edema, nausea/vomiting/constipation/diarrhea, abdominal pain, rash, new or increased bleeding or worsening fatigue.       Per Visit with Dr. Hung on 11/16/20  \" Dexamethasone taper; Decrease 4mg daily.\"    At 1114: Routed to provider    At 1358: This writer notified Jim- prescription refilled.     "

## 2021-01-13 NOTE — PROGRESS NOTES
Africa is a 45 year old who is being evaluated via a billable video visit.      How would you like to obtain your AVS? MyChart  If the video visit is dropped, the invitation should be resent by: Text to cell phone: [phone]  Will anyone else be joining your video visit? No    Arelis Herrera MA      Video-Visit Details  Type of service:  Video Visit    Prep/ reviewing imaging: 15 minutes  Video Start Time: 3:04 PM  Video End Time: 3:45 PM  Post-appointment documentation: 15 minutes    Originating Location (pt. Location): Home    Distant Location (provider location):  [facility]     Platform used for Video Visit: Doximity    __________________________________________________    NEURO-ONCOLOGY VISIT  Yong 15, 2021    CHIEF COMPLAINT: Ms. Africa Dempsey is a 45 year old right-handed woman with a left frontal glioblastoma (IDH wildtype, MGMT promoter unmethylated), diagnosed following resection on 10/1/2019. Following initial diagnosis, Africa deferred standard upfront chemoradiotherapy in favor of alternative/ holistic options. Unfortunately, imaging in 7/2020 showed local as well as distant disease progression. She was deemed a non-surgical candidate.     Africa completed chemoradiotherapy on 8/5/2020. She completed 1 cycle of adjuvant temozolomide when in  9/2020, she became more symptomatic and imaging demonstrated disease progression. Temozolomide was stopped and she was transitioned to second-line therapy.     She is currently managed on bevacizumab (nani) plus lomustine. Imaging in 11/2020 showed a positive treatment response, however, imaging in 1/2021 was concerning for cancer progression.     I met with Africa and Jim () today for this follow-up visit.     HISTORY OF PRESENT ILLNESS  -Last month, Africa had another seizure event. HR was ranging from 40-80's. She was evaluated in the ED and I spoke with the ED MD. Ambulatory cardiac monitoring completed, awaiting  "results. Prior to that, she had another recurrent breakthrough seizure in early Decemebr while on Vimpat 150mg BID. Declined suggested increase in dose to 200mg BID at that time. She is not driving.   -Additionally, a U/S of the legs last month revealed a DVT; started Xarelto. Tolerating anticoagulation well. No SOB, but mild GORDON with more intense exercise.   -Continued chronic fatigue.   -Dexamethasone dose at 4mg daily.   -Otherwise, Africa is feeling \"alright\".   -No nausea. Good appetite. Continued abdominal bloating. No real issue currently with constipation.   -Tolerating nani infusions well.  -Mild increase in headache frequency. Taking Tylenol as needed.   -More cognitively side effects. Word finding issues.   -Feeling overall steady, good balance. No falls. Remaining active; walking about 10-30 minutes/ day.   -Mood is positive.   -No numbness.      REVIEW OF SYSTEMS  A comprehensive ROS negative except as in HPI.      MEDICATIONS   Current Outpatient Medications   Medication Sig Dispense Refill     dexamethasone (DECADRON) 2 MG tablet Take 2 tablets (4 mg) by mouth daily (with breakfast) 60 tablet 1     Lacosamide (VIMPAT) 100 MG TABS tablet Take 1.5 tablets (150 mg) by mouth 2 times daily 60 tablet 5     lomustine (GLEOSTINE) 100 MG capsule Take 2 capsules of 100mg both on Day 1 of each cycle on empty stomach to decrease nausea. 2 capsule 0     ondansetron (ZOFRAN) 4 MG tablet Take 1 tablet (4 mg) by mouth At Bedtime 30 minutes prior to chemotherapy dosing and repeat every 8 hours as needed for nausea. (Patient taking differently: Take 4 mg by mouth as needed 30 minutes prior to chemotherapy dosing and repeat every 8 hours as needed for nausea.) 30 tablet 3     Rivaroxaban ANTICOAGULANT 15 & 20 MG TBPK 15 mg by Oral or Feeding Tube route 2 times daily 3 months       dexamethasone (DECADRON) 4 MG tablet Take 6 mg by mouth       pantoprazole (PROTONIX) 40 MG EC tablet        sennosides (SENOKOT) 8.6 MG " tablet Take 1 tablet by mouth as needed        DRUG ALLERGIES   Allergies   Allergen Reactions     Amoxicillin Hives       ONCOLOGIC HISTORY  -9/2019 PRESENTATION: Progressively worsening headaches over the past 3 weeks   -9/30/2019 MR brain imaging demonstrated a left frontal multi-lobed rim-enhancing mass lesion with associated edema.   -10/1/2019 SURGERY: Craniotomy for mass resection, gross total.   PATHOLOGY: Glioblastoma; IDH1-R132H wildtype/ IDH1, IHD2 wildtype by next generation sequencing. TP53 mutated. BRAF, PTEN not mutated. MGMT promoter unmethylated.  -10/28/2019 NEURO-ONC: Recommending chemoradiotherapy, however, Africa is opting for alternative/ holistic options.   -7/1/2020 MRB with enhancement around the resection cavity, increased in size with extension into the corpus callosum. There are at least 3 new lesions in the left cerebral hemisphere, distant from the resection cavity.   -7/6/2020 NEURO-ONC: Clinically better on dexamethasone and Keppra. Wanting to initiate standard upfront treatment.   -7/16 - 8/5/2020 CHEMORADS: 40 Gy in 15 fractions with concurrent temozolomide.   -7/27/2020 NEURO-ONC: Continue chemoradiotherapy. Discussed Optune; waiting on this option.   -8/25/2020 SZ: Started Vimpat.   -8/31/2020 NEURO-ONC/ MRB/ CHEMO: Clinically overall stable. Started Vimpat, well tolerated. Dexamethasone 4mg daily. Imaging with concern for early disease progression. Initiating adjuvant temozolomide 150mg/m2 (280mg), cycle 1 to start on 9/2. Optune to be starting.   -9/22/2020 NEURO-ONC/ MRB/ CHEMO: Clinically worse with refractory headaches and fatigue. No seizures on Vimpat. Dexamethasone 4mg TID. Imaging with disease progression and increase perfusion. Stopping adjuvant temozolomide. Starting bevacizumab (sandra) + lomustine.  -9/25/2020 CHEMO: Lomustine, cycle 1.   -9/29/2020 CHEMO: Sandra, first infusion.   -10/20/2020 NEURO-ONC/ CHEMO: Clinically, much improved. Continue sandra. Tapering  dexamethasone. Order of leg U/S.   -10/2020 U/S of legs negative for DVT.   -11/16/2020 NEURO-ONC/ MRB/ CHEMO: Clinically improving. Imaging with positive treatment effect. Continue nani. Decrease dexamethasone to 4mg daily. Increased Vimpat in the setting of a recent recurrent generalized seizure. Plan to start cycle 2 of lomustine this week; pending repeat lab results.   -12/2020 SZ: Recurrent breakthrough seizure in early Decemebr while on Vimpat 150mg BID. Declined suggested increase in dose to 200mg BID.  -12/30/2020 DVT: U/S revealed a DVT, started Xarelto.   -12/2020 SZ: Recurrent seizures; Vimpat continued at 150mg BID.  -1/15/2021 NEURO-ONC/ MRB/ CHEMO: Clinically with recurrent sezires, increased aphasia and headache. Imaging with concern for disease progression. Continue nani. Already took cycle 3 of lomustine on 1/13. Presentation at Brain Tumor Conference on 1/18.     SOCIAL HISTORY   Tobacco use: Never smoker.   Alcohol use: Social.   , 2 children.   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      PHYSICAL EXAMINATION  KPS 70  -Generally well appearing. Good energy.  -Respiratory: No audible wheezing.   -Skin: No facial rashes.  -Psychiatric: Normal mood and affect. Pleasant, talkative.  -Neurologic:   MENTAL STATUS:     Alert, oriented.    Recall: Largely intact with mild confusion.     Mild issues with comprehension.    Speech is largely fluent. Word finding issues noted.    CRANIAL NERVES:     Pupils are equal, round.     Extraocular movements full, patient denies diplopia.     Visual fields appear full.    Right facial droop.   Hearing intact.   With normal phonation, no dysfunction of the palate or tongue.   MOTOR: Antigravity in arms. Mild pronation in the right hand, but no drift.   SENSATION: Intact to light touch throughout.   COORDINATION: Intact bilaterally.     The rest of a comprehensive physical examination is deferred due to PHE (public health emergency) video  visit restrictions.        MEDICAL RECORDS  Obtained and personally reviewed all available outside medical records in addition to reviewing any records available in our electronic system.     LABS  Personally reviewed all available lab results.     IMAGING  Personally reviewed MR brain imaging from today and compared to prior imaging. To my eye, there is an increase in contrast enhancement about the medial aspect of both the left temporal and frontal lesions. No new lesions.     Imaging was shown to and results were reviewed with Africa and Jim.     Case to be discussed at Brain Tumor Conference on 1/18.       IMPRESSION  Clinic was spent discussing in detail the nature of her cancer in light of her recent imaging and current treatment plan. This was in addition to answering questions pertaining to my recommendations and devising the plan as outlined below.     Clinically, Africa is slightly more symptomatic in terms of a subtle increase in headache frequency, aphasia, and confusion/ impaired comprehension. Her strength and balance is largely stable. Dexamethasone dose is currently 4 daily. In the month of December, she did have 2 breakthrough seizures. Vimpat was continued at 150mg BID. A leg U/S identified a DVT and she currently on Xarelto with no significant side effects/ bleeding concerns. Additionally, ambulatory cardiac monitoring was completed and results are pending.     Unfortunately, imaging from today showed concern for disease progression.     Africa is currently on Avastin and she already took her next dose of lomustine prior to this appointment on 1/13. I will discuss her case and imaging at Brain Tumor Conference on 1/18. While I do not anticipate surgery to be an option, repeat radiation could be. In addition, could add pembrolizumab to Avastin + radiation. Could also send pathology for next generation sequencing to evaluate for targetable mutations.       ADDENDUM: Brain Tumor Conference Note.    Case and all relevant imaging was discussed at [facility] Tumor Conference on 1/18.    Consensus was that the most recent imaging was concerning for subtle disease progression. Surgery is not ideal given the multi-focality of the cancer and her current use of Avastin. In addition, repeat radiation is not ideal given size of lesion and prior radiation dose. One option could be for resection to the temporal lesion and radiation to the frontal lesion. However, I am not sure Africa could tolerate a hold in Avastin. I discussed case with Dr. Palencia with radiation oncology.     I called and discussed options with Africa and Jim; At this point, Africa is not interested in a trial of pembrolizumab. She is open to speaking with Dr. Palencia. They are going to check with insurance regarding next generation sequencing testing. Will continue Avastin and they will increase her homeopathic regimen. Recently took lomustine. Continue dexamethasone. At this point, will follow-up with imaging and appt with me in 2 months.     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     -STEROIDS-  -Continue dexamethasone 4mg daily.     -SEIZURE MANAGEMENT-  -History of seizures; generalized events.   -No driving for 3 months (late March 2021).  -Vimpat 150 mg BID.  -Did not tolerate Keppra.     -Quality of life/ MOOD/ FATIGUE-  -Denies any mood issues.  -Continue to monitor mood as untreated/ undertreated depression can worsen fatigue, dysorexia, and quality of life.    -DVT-  -U/S of legs in 12/2020 was + for DVT.   -Lifelong anticoagulation needed.   -Continue Xarelto.     -BRADYCARDIA-  -HR in the 40's.   -Continue to follow with cardiology and ambulatory cardiac monitoring results pending.     Return to clinic pending treatment plan.    In the meantime, Africa  knows to call with questions or concerns or to report new complaints and can be seen sooner if needed.    Shannen Hung MD  Neuro-oncology

## 2021-01-15 NOTE — LETTER
1/15/2021         RE: Africa Dempsey  [address]  [place] [address]        Dear Colleague,    Thank you for referring your patient, Africa Dempsey, to the [facility]. Please see a copy of my visit note below.    Africa is a 45 year old who is being evaluated via a billable video visit.      How would you like to obtain your AVS? MyChart  If the video visit is dropped, the invitation should be resent by: Text to cell phone: [phone]  Will anyone else be joining your video visit? No    Arelis Herrera MA      Video-Visit Details  Type of service:  Video Visit    Prep/ reviewing imaging: 15 minutes  Video Start Time: 3:04 PM  Video End Time: 3:45 PM  Post-appointment documentation: 15 minutes    Originating Location (pt. Location): Home    Distant Location (provider location):  [facility]     Platform used for Video Visit: Doximity    __________________________________________________    NEURO-ONCOLOGY VISIT  Yong 15, 2021    CHIEF COMPLAINT: Ms. Africa Dempsey is a 45 year old right-handed woman with a left frontal glioblastoma (IDH wildtype, MGMT promoter unmethylated), diagnosed following resection on 10/1/2019. Following initial diagnosis, Africa deferred standard upfront chemoradiotherapy in favor of alternative/ holistic options. Unfortunately, imaging in 7/2020 showed local as well as distant disease progression. She was deemed a non-surgical candidate.     Africa completed chemoradiotherapy on 8/5/2020. She completed 1 cycle of adjuvant temozolomide when in  9/2020, she became more symptomatic and imaging demonstrated disease progression. Temozolomide was stopped and she was transitioned to second-line therapy.     She is currently managed on bevacizumab (nani) plus lomustine. Imaging in 11/2020 showed a positive treatment response, however, imaging in 1/2021 was concerning for cancer progression.     I met with Africa and Jim  "() today for this follow-up visit.     HISTORY OF PRESENT ILLNESS  -Last month, Africa had another seizure event. HR was ranging from 40-80's. She was evaluated in the ED and I spoke with the ED MD. Ambulatory cardiac monitoring completed, awaiting results. Prior to that, she had another recurrent breakthrough seizure in early Decemebr while on Vimpat 150mg BID. Declined suggested increase in dose to 200mg BID at that time. She is not driving.   -Additionally, a U/S of the legs last month revealed a DVT; started Xarelto. Tolerating anticoagulation well. No SOB, but mild GORDON with more intense exercise.   -Continued chronic fatigue.   -Dexamethasone dose at 4mg daily.   -Otherwise, Africa is feeling \"alright\".   -No nausea. Good appetite. Continued abdominal bloating. No real issue currently with constipation.   -Tolerating nani infusions well.  -Mild increase in headache frequency. Taking Tylenol as needed.   -More cognitively side effects. Word finding issues.   -Feeling overall steady, good balance. No falls. Remaining active; walking about 10-30 minutes/ day.   -Mood is positive.   -No numbness.      REVIEW OF SYSTEMS  A comprehensive ROS negative except as in HPI.      MEDICATIONS   Current Outpatient Medications   Medication Sig Dispense Refill     dexamethasone (DECADRON) 2 MG tablet Take 2 tablets (4 mg) by mouth daily (with breakfast) 60 tablet 1     Lacosamide (VIMPAT) 100 MG TABS tablet Take 1.5 tablets (150 mg) by mouth 2 times daily 60 tablet 5     lomustine (GLEOSTINE) 100 MG capsule Take 2 capsules of 100mg both on Day 1 of each cycle on empty stomach to decrease nausea. 2 capsule 0     ondansetron (ZOFRAN) 4 MG tablet Take 1 tablet (4 mg) by mouth At Bedtime 30 minutes prior to chemotherapy dosing and repeat every 8 hours as needed for nausea. (Patient taking differently: Take 4 mg by mouth as needed 30 minutes prior to chemotherapy dosing and repeat every 8 hours as needed for nausea.) 30 tablet " 3     Rivaroxaban ANTICOAGULANT 15 & 20 MG TBPK 15 mg by Oral or Feeding Tube route 2 times daily 3 months       dexamethasone (DECADRON) 4 MG tablet Take 6 mg by mouth       pantoprazole (PROTONIX) 40 MG EC tablet        sennosides (SENOKOT) 8.6 MG tablet Take 1 tablet by mouth as needed        DRUG ALLERGIES   Allergies   Allergen Reactions     Amoxicillin Hives       ONCOLOGIC HISTORY  -9/2019 PRESENTATION: Progressively worsening headaches over the past 3 weeks   -9/30/2019 MR brain imaging demonstrated a left frontal multi-lobed rim-enhancing mass lesion with associated edema.   -10/1/2019 SURGERY: Craniotomy for mass resection, gross total.   PATHOLOGY: Glioblastoma; IDH1-R132H wildtype/ IDH1, IHD2 wildtype by next generation sequencing. TP53 mutated. BRAF, PTEN not mutated. MGMT promoter unmethylated.  -10/28/2019 NEURO-ONC: Recommending chemoradiotherapy, however, Africa is opting for alternative/ holistic options.   -7/1/2020 MRB with enhancement around the resection cavity, increased in size with extension into the corpus callosum. There are at least 3 new lesions in the left cerebral hemisphere, distant from the resection cavity.   -7/6/2020 NEURO-ONC: Clinically better on dexamethasone and Keppra. Wanting to initiate standard upfront treatment.   -7/16 - 8/5/2020 CHEMORADS: 40 Gy in 15 fractions with concurrent temozolomide.   -7/27/2020 NEURO-ONC: Continue chemoradiotherapy. Discussed Optune; waiting on this option.   -8/25/2020 SZ: Started Vimpat.   -8/31/2020 NEURO-ONC/ MRB/ CHEMO: Clinically overall stable. Started Vimpat, well tolerated. Dexamethasone 4mg daily. Imaging with concern for early disease progression. Initiating adjuvant temozolomide 150mg/m2 (280mg), cycle 1 to start on 9/2. Optune to be starting.   -9/22/2020 NEURO-ONC/ MRB/ CHEMO: Clinically worse with refractory headaches and fatigue. No seizures on Vimpat. Dexamethasone 4mg TID. Imaging with disease progression and increase  perfusion. Stopping adjuvant temozolomide. Starting bevacizumab (sandra) + lomustine.  -9/25/2020 CHEMO: Lomustine, cycle 1.   -9/29/2020 CHEMO: Sandra, first infusion.   -10/20/2020 NEURO-ONC/ CHEMO: Clinically, much improved. Continue sandra. Tapering dexamethasone. Order of leg U/S.   -10/2020 U/S of legs negative for DVT.   -11/16/2020 NEURO-ONC/ MRB/ CHEMO: Clinically improving. Imaging with positive treatment effect. Continue sandra. Decrease dexamethasone to 4mg daily. Increased Vimpat in the setting of a recent recurrent generalized seizure. Plan to start cycle 2 of lomustine this week; pending repeat lab results.   -12/2020 SZ: Recurrent breakthrough seizure in early Decemebr while on Vimpat 150mg BID. Declined suggested increase in dose to 200mg BID.  -12/30/2020 DVT: U/S revealed a DVT, started Xarelto.   -12/2020 SZ: Recurrent seizures; Vimpat continued at 150mg BID.  -1/15/2021 NEURO-ONC/ MRB/ CHEMO: Clinically with recurrent sezires, increased aphasia and headache. Imaging with concern for disease progression. Continue sandra. Already took cycle 3 of lomustine on 1/13. Presentation at Brain Tumor Conference on 1/18.     SOCIAL HISTORY   Tobacco use: Never smoker.   Alcohol use: Social.   , 2 children.   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      PHYSICAL EXAMINATION  KPS 70  -Generally well appearing. Good energy.  -Respiratory: No audible wheezing.   -Skin: No facial rashes.  -Psychiatric: Normal mood and affect. Pleasant, talkative.  -Neurologic:   MENTAL STATUS:     Alert, oriented.    Recall: Largely intact with mild confusion.     Mild issues with comprehension.    Speech is largely fluent. Word finding issues noted.    CRANIAL NERVES:     Pupils are equal, round.     Extraocular movements full, patient denies diplopia.     Visual fields appear full.    Right facial droop.   Hearing intact.   With normal phonation, no dysfunction of the palate or tongue.   MOTOR: Antigravity in  arms. Mild pronation in the right hand, but no drift.   SENSATION: Intact to light touch throughout.   COORDINATION: Intact bilaterally.     The rest of a comprehensive physical examination is deferred due to PHE (public health emergency) video visit restrictions.        MEDICAL RECORDS  Obtained and personally reviewed all available outside medical records in addition to reviewing any records available in our electronic system.     LABS  Personally reviewed all available lab results.     IMAGING  Personally reviewed MR brain imaging from today and compared to prior imaging. To my eye, there is an increase in contrast enhancement about the medial aspect of both the left temporal and frontal lesions. No new lesions.     Imaging was shown to and results were reviewed with Africa and Jim.     Case to be discussed at Brain Tumor Conference on 1/18.       IMPRESSION  Clinic was spent discussing in detail the nature of her cancer in light of her recent imaging and current treatment plan. This was in addition to answering questions pertaining to my recommendations and devising the plan as outlined below.     Clinically, Africa is slightly more symptomatic in terms of a subtle increase in headache frequency, aphasia, and confusion/ impaired comprehension. Her strength and balance is largely stable. Dexamethasone dose is currently 4 daily. In the month of December, she did have 2 breakthrough seizures. Vimpat was continued at 150mg BID. A leg U/S identified a DVT and she currently on Xarelto with no significant side effects/ bleeding concerns. Additionally, ambulatory cardiac monitoring was completed and results are pending.     Unfortunately, imaging from today showed concern for disease progression.     Africa is currently on Avastin and she already took her next dose of lomustine prior to this appointment on 1/13. I will discuss her case and imaging at Brain Tumor Conference on 1/18. While I do not anticipate surgery to  be an option, repeat radiation could be. In addition, could add pembrolizumab to Avastin + radiation. Could also send pathology for next generation sequencing to evaluate for targetable mutations.       ADDENDUM: Brain Tumor Conference Note.   Case and all relevant imaging was discussed at [facility] Brain Tumor Conference on 1/18.    Consensus was that the most recent imaging was concerning for subtle disease progression. Surgery is not ideal given the multi-focality of the cancer and her current use of Avastin. In addition, repeat radiation is not ideal given size of lesion and prior radiation dose. One option could be for resection to the temporal lesion and radiation to the frontal lesion. However, I am not sure Africa could tolerate a hold in Avastin. I discussed case with Dr. Palencia with radiation oncology.     I called and discussed options with Africa and Jim; At this point, Africa is not interested in a trial of pembrolizumab. She is open to speaking with Dr. Palencia. They are going to check with insurance regarding next generation sequencing testing. Will continue Avastin and they will increase her homeopathic regimen. Recently took lomustine. Continue dexamethasone. At this point, will follow-up with imaging and appt with me in 2 months.     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     -STEROIDS-  -Continue dexamethasone 4mg daily.     -SEIZURE MANAGEMENT-  -History of seizures; generalized events.   -No driving for 3 months (late March 2021).  -Vimpat 150 mg BID.  -Did not tolerate Keppra.     -Quality of life/ MOOD/ FATIGUE-  -Denies any mood issues.  -Continue to monitor mood as untreated/ undertreated depression can worsen fatigue, dysorexia, and quality of life.    -DVT-  -U/S of legs in 12/2020 was + for DVT.   -Lifelong  anticoagulation needed.   -Continue Xarelto.     -BRADYCARDIA-  -HR in the 40's.   -Continue to follow with cardiology and ambulatory cardiac monitoring results pending.     Return to clinic pending treatment plan.    In the meantime, Africa knows to call with questions or concerns or to report new complaints and can be seen sooner if needed.    Shannen Hung MD  Neuro-oncology      Again, thank you for allowing me to participate in the care of your patient.        Sincerely,        Shannen Hung MD

## 2021-01-19 NOTE — PROGRESS NOTES
Infusion Nursing Note:  Africa GAURI Dempsey presents today for MVASI.    Patient seen by provider today: No   present during visit today: Not Applicable.    Note: Pt. Saw provider 1/17- labs WNL.      Intravenous Access:  Peripheral IV placed.    Treatment Conditions:  B/P WNL. CBC WNL.      Post Infusion Assessment:  Patient tolerated infusion without incident.  Blood return noted pre and post infusion.  Site patent and intact, free from redness, edema or discomfort.  No evidence of extravasations.  Access discontinued per protocol.       Discharge Plan:   Patient discharged in stable condition accompanied by: self.  Departure Mode: Ambulatory.    Maryanne Robles RN

## 2021-01-21 NOTE — TELEPHONE ENCOUNTER
Pt/spouse requesting refill of Xarelto, stated she finished BID dosing of 20 mg, starting on daily 15 mg today and has #8 pills left. Asks if future fills can have refills so they don't have to call monthly. Routed to Dr. Hung for approval.

## 2021-01-21 NOTE — TELEPHONE ENCOUNTER
Call made to Maxine to offer phone follow up/conversation to discuss radiation therapy possibility of re treatment in setting of probable recurrence.  Initially Maxine did not want to talk about radiation - but they will talk together and decide if they would like a phone visit with Luis Palencia to discuss what treatment option might be available and what outcome might be.  Awaiting call back from family on how they would like to proceed.

## 2021-01-26 NOTE — LETTER
2021      RE: Africa Dempsey  [address]  [facility] [address]          Department of Radiation Oncology  Radiation Therapy Center  [facility] [place] [address]  [phone]       Radiation Oncology Follow-up Visit  2021      Africa Dempsey  MRN: [id number]   : 1975     DIAGNOSIS: recurrent Glioblastoma multiforme status post resection without initial adjuvant therapy  PATHOLOGY:  Final pathology demonstrated glioblastoma multiforme, IDH 1 wild-type, MGMT promoter site unmethylated.                            STAGE: Grade IV  INTENT OF RADIOTHERAPY: definitive chemo-RT.   CONCURRENT SYSTEMIC THERAPY:  Yes, Temodar     ONCOLOGIC HISTORY:    Ms. Dempsey is a 45 year old female recurrent Glioblastoma multiforme (IDH-1 wild type, MGMT promotor site not hyper methylated)  status post resection without initial adjuvant therapy.      The patient's oncologic history dates to 2019 when she presented with progressively worsening headaches.  Imaging at that time including MRI of the brain demonstrated a left frontal enhancing mass concerning for primary brain tumor.  On 10/1/2019 the patient underwent gross total resection by Dr. Yo of the lesion.  Final pathology demonstrated glioblastoma multiforme, IDH 1 wild-type, MGMT promoter site unmethylated.  Adjuvant chemoradiation therapy following surgery was discussed with the patient, however patient declined.  She was followed with subsequent surveillance imaging and was without evidence of progressive disease until scans on 2020.  On 2020 the patient noticed headache and progressive word finding difficulty.  Imaging including brain MRI on 2020 demonstrated concern for multifocal areas of enhancement including the prior resection cavity in the left frontal lobe as well as other potential sites of disease including an adjacent left frontal lobe lesion, left temporal lobe lesion, and T2  FLAIR abnormality involving the left hippocampus region.  Reported the patient underwent a syncopal episode and cardiac arrest leading to CPR at the outside hospital.  She was eventually transferred to [facility] for further management.  She is evaluated by cardiology who felt the edema could have possibly contributed to arrhythmia.  She was initiated on dopamine drip, eventually tapered off.  ECHO was negative. The patient's case was discussed and neuro-oncology tumor board recommendation was to proceed with definitive management chemoradiation therapy.  Patient met with Dr. Hung medical oncology who discussed concurrent and adjuvant Temodar.  Patient subsequently presented in radiation oncology clinic to discuss next steps in management regarding radiation therapy.        SITE OF TREATMENT: Partial brain     DATES  OF TREATMENT: 20-20     TOTAL DOSE OF TREATMENT: 4005 cGy/ 4005 cGy     DOSE PER FRACTION OF TREATMENT: 267 cGy x 15 fractions    INTERVAL SINCE COMPLETION OF RADIATION THERAPY:   5.5 months    SUBJECTIVE:   The patient returns for follow up.     Post completed of adjuvant CRT, patient was transitioned to adjuvant TMZ. She unfortunately progressed after 1 cycle. She was started on second line systemic therapy with  Avastin and lomustine (last given 21) under care of Dr. Hung. Initially had some response. Most recent MRI on 1/15/21 demonstrated POD in L frontal lobe (x2 lesions) and L temporal lobe.     Clinically, she has noticed interval seizure events, headache, and aphasia. She remains on anti seizure medication and decadron 4mg daily.     She has met with Dr. Hung to discuss potential options given POD. Options include continuing concurrent systemic treatment with consideration of addition of immunotherapy. Alterative option was to consider local treatment. She presents today to discuss potential role of RT in setting of re-irradiation.     LABS AND IMAGIN/15/21  MRI brain  FINDINGS:  Postoperative changes consisting of a small left frontal  craniotomy again noted.     A ring-enhancing mass in the anterior medial aspect of the left  frontal lobe is again noted but has increased in size since the  comparison study, now measuring 4.0 x 3.1 x 3.5 cm in the greatest AP,  transverse and cephalocaudal dimensions, respectively, compared to 3.9  x 2.3 x 3.4 cm in the same dimensions previously. This increased size  represents interval development of new abnormal contrast enhancement  at the posteromedial aspect of the mass.     There is a ring-enhancing nodule in the high mid left frontal lobe  that has increased minimally in size from the comparison study, now  measuring roughly 1.2 cm long axis compared to 1.1 cm long axis  previously.     There has been interval increase in size of a ring-enhancing mass in  the left temporal lobe, now measuring 4.4 x 3.1 x 3.2 cm in the  greatest AP, transverse and cephalocaudad dimensions, respectively,  compared to 4.0 x 2.8 x 2.7 cm in the same dimensions previously. This  represents interval development of thickening of the enhancing rim at  the medial aspect of the lesion.     No new lesions noted.     No hydrocephalus. No intracranial hemorrhage. No recent ischemic  infarcts. No midline shift.     No sinusitis or mastoiditis.                                                                      IMPRESSION:  1. Interval increase in size in the enhancing masses in the anterior  left frontal lobe and left temporal lobe as well as in the nodule in  the high left frontal lobe worrisome for progression of tumor.  Continued surveillance recommended.  2. No evidence for acute intracranial pathology.        IMPRESSION:   Ms. Dempsey is a 45 year old female with recurrent Glioblastoma multiforme (IDH-1 wild type, MGMT promotor site not hyper methylated)  status post resection without initial adjuvant therapy. She completed definitive chemo-RT (40.05 Gy in 15 fractions,  8/5/20).    PLAN:   1. The patient has slight POD on second line systemic therapy with Avastin + lomustine. Most recent scans demonstrate a L frontal dominant lesion,  adjacent small L frontal lesion, and dominant L frontal lesion. These were all encompassed in prior RT field with initial course of hypofractionated radiation therapy.     2. We discussed different local therapy options including considering hybrid approach such as re-RT to L frontal lesions and surgical resection to L temporal lobe (as this would be more challenging of the lesions to re-treat given proximity to optic structures and brainstem). However, surgery is likely not a good option as she would have to be off of Avastin for extended time prior to surgery and given POD on imaging and clinically, she will likely not tolerate this. We therefore, discussed consideration of re-RT to L frontal and L temporal lesions in a staged approach with goal of reducing toxicity and evaluation of response biologically. I discussed consideration of 35 Gy in 10 fractions (Fogh et al JCO 2010, ROTG 1205) fractionation scheme with target encompassing gross enhancing tumor.     3. I discussed side effects of treatment particularly highlighting risk of late toxicity including radiation necrosis with focal neurologic deficit and potential injury to surrounding structures including but not limited to optic nerves and brainstem. I discussed the risk of these toxicities must be weighed against the tumor itself.     4. Ultimately, the patient and  wished to think about their options and get back to us. She will otherwise continue systemic therapy under the care of Dr. Hung.      Due to the concerns around COVID-19 and adhering to social distancing we conduct this visit over the telephone. Telephone call lasted 20 minutes.       Dhruv Palencia M.D.  Department of Radiation Oncology  [facility]

## 2021-01-26 NOTE — LETTER
2021         RE: Africa Dempsey  [address]  [facility] [address]        Dear Colleague,    Thank you for referring your patient, Africa Dempsey, to the [facility]. Please see a copy of my visit note below.       Department of Radiation Oncology  Radiation Therapy Center  [facility]  [place] [address]  [phone]       Radiation Oncology Follow-up Visit  2021      Africa Dempsey  MRN: [id number]   : 1975     DIAGNOSIS: recurrent Glioblastoma multiforme status post resection without initial adjuvant therapy  PATHOLOGY:  Final pathology demonstrated glioblastoma multiforme, IDH 1 wild-type, MGMT promoter site unmethylated.                            STAGE: Grade IV  INTENT OF RADIOTHERAPY: definitive chemo-RT.   CONCURRENT SYSTEMIC THERAPY:  Yes, Temodar     ONCOLOGIC HISTORY:    Ms. Dempsey is a 45 year old female recurrent Glioblastoma multiforme (IDH-1 wild type, MGMT promotor site not hyper methylated)  status post resection without initial adjuvant therapy.      The patient's oncologic history dates to 2019 when she presented with progressively worsening headaches.  Imaging at that time including MRI of the brain demonstrated a left frontal enhancing mass concerning for primary brain tumor.  On 10/1/2019 the patient underwent gross total resection by Dr. Yo of the lesion.  Final pathology demonstrated glioblastoma multiforme, IDH 1 wild-type, MGMT promoter site unmethylated.  Adjuvant chemoradiation therapy following surgery was discussed with the patient, however patient declined.  She was followed with subsequent surveillance imaging and was without evidence of progressive disease until scans on 2020.  On 2020 the patient noticed headache and progressive word finding difficulty.  Imaging including brain MRI on 2020 demonstrated concern for multifocal areas of enhancement including the prior resection  cavity in the left frontal lobe as well as other potential sites of disease including an adjacent left frontal lobe lesion, left temporal lobe lesion, and T2 FLAIR abnormality involving the left hippocampus region.  Reported the patient underwent a syncopal episode and cardiac arrest leading to CPR at the outside hospital.  She was eventually transferred to [facility] for further management.  She is evaluated by cardiology who felt the edema could have possibly contributed to arrhythmia.  She was initiated on dopamine drip, eventually tapered off.  ECHO was negative. The patient's case was discussed and neuro-oncology tumor board recommendation was to proceed with definitive management chemoradiation therapy.  Patient met with Dr. Hung medical oncology who discussed concurrent and adjuvant Temodar.  Patient subsequently presented in radiation oncology clinic to discuss next steps in management regarding radiation therapy.        SITE OF TREATMENT: Partial brain     DATES  OF TREATMENT: 7/16/20-8/5/20     TOTAL DOSE OF TREATMENT: 4005 cGy/ 4005 cGy     DOSE PER FRACTION OF TREATMENT: 267 cGy x 15 fractions    INTERVAL SINCE COMPLETION OF RADIATION THERAPY:   5.5 months    SUBJECTIVE:   The patient returns for follow up.     Post completed of adjuvant CRT, patient was transitioned to adjuvant TMZ. She unfortunately progressed after 1 cycle. She was started on second line systemic therapy with  Avastin and lomustine (last given 1/13/21) under care of Dr. Hung. Initially had some response. Most recent MRI on 1/15/21 demonstrated POD in L frontal lobe (x2 lesions) and L temporal lobe.     Clinically, she has noticed interval seizure events, headache, and aphasia. She remains on anti seizure medication and decadron 4mg daily.     She has met with Dr. Hung to discuss potential options given POD. Options include continuing concurrent systemic treatment with consideration of addition of immunotherapy. Alterative option was to  consider local treatment. She presents today to discuss potential role of RT in setting of re-irradiation.     LABS AND IMAGIN/15/21  MRI brain  FINDINGS: Postoperative changes consisting of a small left frontal  craniotomy again noted.     A ring-enhancing mass in the anterior medial aspect of the left  frontal lobe is again noted but has increased in size since the  comparison study, now measuring 4.0 x 3.1 x 3.5 cm in the greatest AP,  transverse and cephalocaudal dimensions, respectively, compared to 3.9  x 2.3 x 3.4 cm in the same dimensions previously. This increased size  represents interval development of new abnormal contrast enhancement  at the posteromedial aspect of the mass.     There is a ring-enhancing nodule in the high mid left frontal lobe  that has increased minimally in size from the comparison study, now  measuring roughly 1.2 cm long axis compared to 1.1 cm long axis  previously.     There has been interval increase in size of a ring-enhancing mass in  the left temporal lobe, now measuring 4.4 x 3.1 x 3.2 cm in the  greatest AP, transverse and cephalocaudad dimensions, respectively,  compared to 4.0 x 2.8 x 2.7 cm in the same dimensions previously. This  represents interval development of thickening of the enhancing rim at  the medial aspect of the lesion.     No new lesions noted.     No hydrocephalus. No intracranial hemorrhage. No recent ischemic  infarcts. No midline shift.     No sinusitis or mastoiditis.                                                                      IMPRESSION:  1. Interval increase in size in the enhancing masses in the anterior  left frontal lobe and left temporal lobe as well as in the nodule in  the high left frontal lobe worrisome for progression of tumor.  Continued surveillance recommended.  2. No evidence for acute intracranial pathology.        IMPRESSION:   Ms. Dempsey is a 45 year old female with recurrent Glioblastoma multiforme (IDH-1 wild type,  MGMT promotor site not hyper methylated)  status post resection without initial adjuvant therapy. She completed definitive chemo-RT (40.05 Gy in 15 fractions, 8/5/20).    PLAN:   1. The patient has slight POD on second line systemic therapy with Avastin + lomustine. Most recent scans demonstrate a L frontal dominant lesion,  adjacent small L frontal lesion, and dominant L frontal lesion. These were all encompassed in prior RT field with initial course of hypofractionated radiation therapy.     2. We discussed different local therapy options including considering hybrid approach such as re-RT to L frontal lesions and surgical resection to L temporal lobe (as this would be more challenging of the lesions to re-treat given proximity to optic structures and brainstem). However, surgery is likely not a good option as she would have to be off of Avastin for extended time prior to surgery and given POD on imaging and clinically, she will likely not tolerate this. We therefore, discussed consideration of re-RT to L frontal and L temporal lesions in a staged approach with goal of reducing toxicity and evaluation of response biologically. I discussed consideration of 35 Gy in 10 fractions (Fogh et al JCO 2010, ROTG 1205) fractionation scheme with target encompassing gross enhancing tumor.     3. I discussed side effects of treatment particularly highlighting risk of late toxicity including radiation necrosis with focal neurologic deficit and potential injury to surrounding structures including but not limited to optic nerves and brainstem. I discussed the risk of these toxicities must be weighed against the tumor itself.     4. Ultimately, the patient and  wished to think about their options and get back to us. She will otherwise continue systemic therapy under the care of Dr. Hung.      Due to the concerns around COVID-19 and adhering to social distancing we conduct this visit over the telephone. Telephone call lasted  20 minutes.       Dhruv Palencia M.D.  Department of Radiation Oncology  [facility]             Again, thank you for allowing me to participate in the care of your patient.        Sincerely,        Dhruv Palencia MD

## 2021-01-26 NOTE — PROGRESS NOTES
Department of Radiation Oncology  Radiation Therapy Center  [facility] [place] [address]  [phone]       Radiation Oncology Follow-up Visit  2021      Africa Dempsey  MRN: [id number]   : 1975     DIAGNOSIS: recurrent Glioblastoma multiforme status post resection without initial adjuvant therapy  PATHOLOGY:  Final pathology demonstrated glioblastoma multiforme, IDH 1 wild-type, MGMT promoter site unmethylated.                            STAGE: Grade IV  INTENT OF RADIOTHERAPY: definitive chemo-RT.   CONCURRENT SYSTEMIC THERAPY:  Yes, Temodar     ONCOLOGIC HISTORY:    Ms. Dempsey is a 45 year old female recurrent Glioblastoma multiforme (IDH-1 wild type, MGMT promotor site not hyper methylated)  status post resection without initial adjuvant therapy.      The patient's oncologic history dates to 2019 when she presented with progressively worsening headaches.  Imaging at that time including MRI of the brain demonstrated a left frontal enhancing mass concerning for primary brain tumor.  On 10/1/2019 the patient underwent gross total resection by Dr. Yo of the lesion.  Final pathology demonstrated glioblastoma multiforme, IDH 1 wild-type, MGMT promoter site unmethylated.  Adjuvant chemoradiation therapy following surgery was discussed with the patient, however patient declined.  She was followed with subsequent surveillance imaging and was without evidence of progressive disease until scans on 2020.  On 2020 the patient noticed headache and progressive word finding difficulty.  Imaging including brain MRI on 2020 demonstrated concern for multifocal areas of enhancement including the prior resection cavity in the left frontal lobe as well as other potential sites of disease including an adjacent left frontal lobe lesion, left temporal lobe lesion, and T2 FLAIR abnormality involving the left hippocampus region.  Reported the patient underwent  a syncopal episode and cardiac arrest leading to CPR at the outside hospital.  She was eventually transferred to [facility] for further management.  She is evaluated by cardiology who felt the edema could have possibly contributed to arrhythmia.  She was initiated on dopamine drip, eventually tapered off.  ECHO was negative. The patient's case was discussed and neuro-oncology tumor board recommendation was to proceed with definitive management chemoradiation therapy.  Patient met with Dr. Hung medical oncology who discussed concurrent and adjuvant Temodar.  Patient subsequently presented in radiation oncology clinic to discuss next steps in management regarding radiation therapy.        SITE OF TREATMENT: Partial brain     DATES  OF TREATMENT: 20-20     TOTAL DOSE OF TREATMENT: 4005 cGy/ 4005 cGy     DOSE PER FRACTION OF TREATMENT: 267 cGy x 15 fractions    INTERVAL SINCE COMPLETION OF RADIATION THERAPY:   5.5 months    SUBJECTIVE:   The patient returns for follow up.     Post completed of adjuvant CRT, patient was transitioned to adjuvant TMZ. She unfortunately progressed after 1 cycle. She was started on second line systemic therapy with  Avastin and lomustine (last given 21) under care of Dr. Hung. Initially had some response. Most recent MRI on 1/15/21 demonstrated POD in L frontal lobe (x2 lesions) and L temporal lobe.     Clinically, she has noticed interval seizure events, headache, and aphasia. She remains on anti seizure medication and decadron 4mg daily.     She has met with Dr. Hung to discuss potential options given POD. Options include continuing concurrent systemic treatment with consideration of addition of immunotherapy. Alterative option was to consider local treatment. She presents today to discuss potential role of RT in setting of re-irradiation.     LABS AND IMAGIN/15/21  MRI brain  FINDINGS: Postoperative changes consisting of a small left frontal  craniotomy again noted.     A  ring-enhancing mass in the anterior medial aspect of the left  frontal lobe is again noted but has increased in size since the  comparison study, now measuring 4.0 x 3.1 x 3.5 cm in the greatest AP,  transverse and cephalocaudal dimensions, respectively, compared to 3.9  x 2.3 x 3.4 cm in the same dimensions previously. This increased size  represents interval development of new abnormal contrast enhancement  at the posteromedial aspect of the mass.     There is a ring-enhancing nodule in the high mid left frontal lobe  that has increased minimally in size from the comparison study, now  measuring roughly 1.2 cm long axis compared to 1.1 cm long axis  previously.     There has been interval increase in size of a ring-enhancing mass in  the left temporal lobe, now measuring 4.4 x 3.1 x 3.2 cm in the  greatest AP, transverse and cephalocaudad dimensions, respectively,  compared to 4.0 x 2.8 x 2.7 cm in the same dimensions previously. This  represents interval development of thickening of the enhancing rim at  the medial aspect of the lesion.     No new lesions noted.     No hydrocephalus. No intracranial hemorrhage. No recent ischemic  infarcts. No midline shift.     No sinusitis or mastoiditis.                                                                      IMPRESSION:  1. Interval increase in size in the enhancing masses in the anterior  left frontal lobe and left temporal lobe as well as in the nodule in  the high left frontal lobe worrisome for progression of tumor.  Continued surveillance recommended.  2. No evidence for acute intracranial pathology.        IMPRESSION:   Ms. Dempsey is a 45 year old female with recurrent Glioblastoma multiforme (IDH-1 wild type, MGMT promotor site not hyper methylated)  status post resection without initial adjuvant therapy. She completed definitive chemo-RT (40.05 Gy in 15 fractions, 8/5/20).    PLAN:   1. The patient has slight POD on second line systemic therapy with  Avastin + lomustine. Most recent scans demonstrate a L frontal dominant lesion,  adjacent small L frontal lesion, and dominant L frontal lesion. These were all encompassed in prior RT field with initial course of hypofractionated radiation therapy.     2. We discussed different local therapy options including considering hybrid approach such as re-RT to L frontal lesions and surgical resection to L temporal lobe (as this would be more challenging of the lesions to re-treat given proximity to optic structures and brainstem). However, surgery is likely not a good option as she would have to be off of Avastin for extended time prior to surgery and given POD on imaging and clinically, she will likely not tolerate this. We therefore, discussed consideration of re-RT to L frontal and L temporal lesions in a staged approach with goal of reducing toxicity and evaluation of response biologically. I discussed consideration of 35 Gy in 10 fractions (Fogh et al JCO 2010, ROTG 1205) fractionation scheme with target encompassing gross enhancing tumor.     3. I discussed side effects of treatment particularly highlighting risk of late toxicity including radiation necrosis with focal neurologic deficit and potential injury to surrounding structures including but not limited to optic nerves and brainstem. I discussed the risk of these toxicities must be weighed against the tumor itself.     4. Ultimately, the patient and  wished to think about their options and get back to us. She will otherwise continue systemic therapy under the care of Dr. Hung.      Due to the concerns around COVID-19 and adhering to social distancing we conduct this visit over the telephone. Telephone call lasted 20 minutes.       Dhruv Palencia M.D.  Department of Radiation Oncology  [facility]

## 2021-01-26 NOTE — NURSING NOTE
Africa is a 45 year old who is being evaluated via a billable telephone visit.      What phone number would you like to be contacted at? Jim's cell  How would you like to obtain your AVS? Pilar  Phone call duration: 3 minutes MARISSA Mark RN

## 2021-02-02 NOTE — PROGRESS NOTES
Infusion Nursing Note:  Africa Dempsey presents today for MVASI C4D1.    Patient seen by provider today: No   present during visit today: Not Applicable.    Note: N/A.    Intravenous Access:  Implanted Port.    Treatment Conditions:  Lab Results   Component Value Date    HGB 15.1 02/02/2021     Lab Results   Component Value Date    WBC 6.7 02/02/2021      Lab Results   Component Value Date    ANEU 5.7 02/02/2021     Lab Results   Component Value Date    PLT 98 02/02/2021      Lab Results   Component Value Date     02/02/2021                   Lab Results   Component Value Date    POTASSIUM 4.4 02/02/2021           Lab Results   Component Value Date    MAG 2.2 07/03/2020            Lab Results   Component Value Date    CR 0.72 02/02/2021                   Lab Results   Component Value Date    HANH 10.2 02/02/2021                Lab Results   Component Value Date    BILITOTAL 0.4 02/02/2021           Lab Results   Component Value Date    ALBUMIN 3.9 02/02/2021                    Lab Results   Component Value Date    ALT 49 02/02/2021           Lab Results   Component Value Date    AST 19 02/02/2021   Results reviewed, labs MET treatment parameters, ok to proceed with treatment.    Post Infusion Assessment:  Patient tolerated infusion without incident.  Site patent and intact, free from redness, edema or discomfort.  No evidence of extravasations.  Access discontinued per protocol.     Discharge Plan:   Discharge instructions reviewed with: Patient.  Patient and/or family verbalized understanding of discharge instructions and all questions answered.  AVS to patient via [employer].  Patient will return 2/16/2021 for next appointment.   Patient discharged in stable condition accompanied by: self.  Departure Mode: Ambulatory.    Kemi Gill RN

## 2021-02-16 NOTE — PROGRESS NOTES
Infusion Nursing Note:  Africa Dempsey presents today for MVASI.    Patient seen by provider today: No   present during visit today: Not Applicable.    Note: N/a.  Patient did meet criteria for an asymptomatic covid-19 PCR test in infusion today. Patient declined the covid-19 test.    Intravenous Access:  Peripheral IV placed.    Treatment Conditions:  Lab Results   Component Value Date    HGB 14.5 02/16/2021     Lab Results   Component Value Date    WBC 4.8 02/16/2021      Lab Results   Component Value Date    ANEU 4.1 02/16/2021     Lab Results   Component Value Date    PLT 89 02/16/2021      Lab Results   Component Value Date     02/16/2021                   Lab Results   Component Value Date    POTASSIUM 5.0 02/16/2021           Lab Results   Component Value Date    MAG 2.2 07/03/2020            Lab Results   Component Value Date    CR 0.73 02/16/2021                   Lab Results   Component Value Date    HANH 10.1 02/16/2021                Lab Results   Component Value Date    BILITOTAL 0.3 02/16/2021           Lab Results   Component Value Date    ALBUMIN 3.7 02/16/2021                    Lab Results   Component Value Date    ALT 43 02/16/2021           Lab Results   Component Value Date    AST 23 02/16/2021     Post Infusion Assessment:  Patient tolerated infusion without incident.  Blood return noted pre and post infusion.  Site patent and intact, free from redness, edema or discomfort.  No evidence of extravasations.  Access discontinued per protocol.     Discharge Plan:   Discharge instructions reviewed with: Patient.  Patient and/or family verbalized understanding of discharge instructions and all questions answered.  AVS to patient via docplannerT.  Patient will return 3/2/2021 for next appointment.   Patient discharged in stable condition accompanied by: self.  Departure Mode: Ambulatory.    Kemi Gill RN

## 2021-03-02 NOTE — PROGRESS NOTES
Infusion Nursing Note:  Africa Dempsey presents today for MVASI.    Patient seen by provider today: No   present during visit today: Not Applicable.    Note: N/A.  Patient did meet criteria for an asymptomatic covid-19 PCR test in infusion today. Patient declined the covid-19 test.    Intravenous Access:  Peripheral IV placed.    Treatment Conditions:  Lab Results   Component Value Date    HGB 15.0 03/02/2021     Lab Results   Component Value Date    WBC 4.8 03/02/2021      Lab Results   Component Value Date    ANEU 3.6 03/02/2021     Lab Results   Component Value Date     03/02/2021   Results reviewed, labs MET treatment parameters, ok to proceed with treatment.    Post Infusion Assessment:  Patient tolerated infusion without incident.  Site patent and intact, free from redness, edema or discomfort.  No evidence of extravasations.  Access discontinued per protocol.     Discharge Plan:   Discharge instructions reviewed with: Patient.  Patient and/or family verbalized understanding of discharge instructions and all questions answered.  AVS to patient via [employer].  Patient will return 3/16/2021 for next appointment.   Patient discharged in stable condition accompanied by: self and .  Departure Mode: Ambulatory.    Kemi Gill RN

## 2021-03-15 NOTE — TELEPHONE ENCOUNTER
"She is on blood thinners, and she fell on her right need today. No open areas, ice to the knee, she did have a blood clot in her leg about a month ago. Currently no pain, instructed to use tylenol if needed. Can walk and bear weight . Small amount of swelling and a small bruise.  Being on blood thinners, instructed to come in with in 3 days, or sooner if it gets worse or changes.  She has an appointment in the morning for an infusion.    Kandi Tejada RN/ [facility]        Additional Information    Negative: Serious injury with multiple fractures    Negative: [1] Major bleeding (e.g., actively dripping or spurting) AND [2] can't be stopped    Negative: Looks like a dislocated joint or knee cap (crooked or deformed)    Negative: Bullet wound, stabbed by knife, or other serious penetrating wound    Negative: Sounds like a life-threatening emergency to the triager    Negative: Wound looks infected    Negative: Can't stand (bear weight) or walk    Negative: Skin is split open or gaping (or length > 1/2 inch or 12 mm)    Negative: [1] Bleeding AND [2] won't stop after 10 minutes of direct pressure (using correct technique)    Negative: [1] Dirt in the wound AND [2] not removed with 15 minutes of scrubbing    Negative: Sounds like a serious injury to the triager    Negative: [1] SEVERE pain AND [2] not improved 2 hours after pain medicine/ice packs    Negative: Suspicious history for the injury    Negative: [1] High-risk adult (e.g., age > 60, osteoporosis, chronic steroid use) AND [2] limping    Negative: A \"snap\" or \"pop\" was heard at the time of injury    Negative: Large swelling or bruise (> 2 inches or 5 cm)    Negative: [1] No prior tetanus shots (or is not fully vaccinated) AND [2] any wound (e.g., cut, scrape)    Knee giving way (or buckling) when walking    Negative: [1] Limp when walking AND [2] due to a direct blow    Negative: [1] Limp when walking AND [2] due to a twisted knee    Negative: " [1] Last tetanus shot > 10 years ago AND [2] CLEAN cut or scrape (e.g., object AND skin were clean)    Negative: [1] Last tetanus shot > 5 years ago AND [2] DIRTY cut or scrape    Followed a knee injury    Protocols used: KNEE INJURY-A-AH, KNEE SWELLING-A-AH

## 2021-03-16 NOTE — PROGRESS NOTES
Infusion Nursing Note:  Africa Dempsey presents today for MVASI C5D1.    Patient seen by provider today: No   present during visit today: Not Applicable.    Note: N/A.    Intravenous Access:  Peripheral IV placed.    Treatment Conditions:  Not Applicable.    Post Infusion Assessment:  Patient tolerated injection without incident.  Blood return noted pre and post infusion.  Site patent and intact, free from redness, edema or discomfort.  No evidence of extravasations.  Access discontinued per protocol.     Discharge Plan:   Discharge instructions reviewed with: Patient.  Patient and/or family verbalized understanding of discharge instructions and all questions answered.  AVS to patient via [email or web address].  Patient will return 3/22/2021 for ideo visit with Dr. Hung for next appointment.   Patient discharged in stable condition accompanied by: self.  Departure Mode: Ambulatory.    Kemi Gill RN

## 2021-03-16 NOTE — TELEPHONE ENCOUNTER
Pt's SO Jim called noting that the Pt was having increasing mobility issues and falling/tripping episodes. Pt recently hurt knee and ankle sreekanth to this. Is requesting DME order for a walker with a seat and a shower chair. I talked through the process, and said he would mostly work with a retailer once the order is placed, but the team may reach out with more information or questions.

## 2021-03-17 NOTE — TELEPHONE ENCOUNTER
RN Care Coordination Note    Provider Response:  Confirm Vimpat dose is 150mg BID, recommend increasing to 200mg BID. Confirm dexamethasone dose, recommend increasing each dose by 2mg (ie; if 4mg in AM and 4mg at noon, then would be 6mg in AM and 6mg at noon). Has MRI on 3/19 and appt with me on Monday already scheduled.   Shannen Hung MD   Neuro-oncology   3/17/2021  Outgoing Call:   Spoke with patient's , confirmed current Vimpat dose is 150 mg BID and dexamethasone dose is 2 mg BID. Per Dr. Hung's recommendations above, advised him to increase Vimpat dose to 200 mg BID and increase dexamethasone dose to 4 mg in AM and 4 mg at Noon. He verbalized understanding and will notify us of any changes.    Sara Melgar RN, BSN  Care Coordinator   [facility]

## 2021-03-17 NOTE — TELEPHONE ENCOUNTER
"[facility] Telephone Triage Note    Assessment:   Jim (caregiver) reporting the following symptoms: Legs becoming real shakey, fell two nights ago which has already been reported. Currently able to do ADL\"s with caregiver assist but they are wanting DME.    Wondering about status of walker with a chair/wheels and a shower chair and if covered by insurance?    This writer also recommended to call pt's insurance company if there is coverage for the durable medical equipment above.    Recommendations: Routed to RNCC Sara    Follow-Up:  Patient voiced understanding of advice and/or instructions given.     "

## 2021-03-17 NOTE — TELEPHONE ENCOUNTER
"Symptomatic my chart  [facility] Telephone Triage Note    Assessment:   Jim (caregiver)reporting the following symptoms:  \"physical well being of walking has been declining, getting harder and harder to keep up with daily half mile walks. In last couple weeks or so needing to lean against counter, shaky leg would last for about 15 seconds, then episode would pass and regain strength. As day went on, then shakiness would disappear, but last couple days more frequency occurrence.   Fell occurred from bedroom to kitchen about 20ft, about 15ft both legs shaked and pt fell on knees and hurting ankle, happening 4x yesterday. Feels seizures have mutated involving extremities\" Next time has shaky extremities, Jim will check HR as historically during seizures the HR has fluctuated between 65 and 30bpm.\"    Knee swelled up about 1 inch bruise, and ankle is doing a lot better, using an organic \"heat\" ointment and iced 20 minutes on and off.   Still has a slight limp but rates pain 3/10 and tolerable    In last 24hrs, experienced one headache last night, lasted 1-2 minutes, rare occurrence,  But relieved on own. Jim knows if incident begins to increase to report back to Dr. Hung.     Continues to be aware of balance issues, needs to walk straight.    Jim reports, pt is reporting that about 1 week ago is beginning to lose some peripheral vision on left side of eye. Also that pt stating to Jim incidents of \"sees a palm tree\" and \"seeing a person next to her\".  Jim verbalized ability to listen to Africa during these confusing moments, verbalized to redirect topic if needed.     Continues to eat salads and vegetable juice mixes, mixed nuts, and no artificial sweeteners.     Continues to be able to name her spouse, two of her four children, with ability to still read 3 to 4 sentences and occasional clear statements.     Denies bowel/bladder issues, n/v, numbness, tingling.    Is shaking in legs r/t possible " seizure episode?       Recommendations: Routed & Paged provider.    Follow-Up:

## 2021-03-19 NOTE — TELEPHONE ENCOUNTER
Called and spoke with Africa and Jim.     Vimpat was increased because her left leg was jerking. Since increasing Vimpat to 200mg BID and dexamethasone, she is noting significant cognitive impairment/ confusion. Noting a new left facial droop. Africa is also more fatigued/ sleeping more throughout the day. Denies headaches at this time. No issues with nausea, has a good appetite. Continued worsening aphasia. Progressive difficulty in walking.     I had personally reviewed MR brain imaging from today and just now, discussed the concerning results with Africa and Jim. The imaging demonstrates significant disease progression with left - right midline shift. The enlarging cancer in the left temporal and frontal lobe localizes to her worsening neurological deficits.      We discussed how there is no remaining treatment options (including surgery, radiation, or chemotherapy) available. Africa and Jim are in agreement to stop nani and lomustine.     Jim will start investigating hospice options and is not interested in a hospice referral at this time. I provided them with a prognosis of < 2 months.    I encouraged them to call with any additional needs/ concerns.      PLAN:   Agree with decreasing Vimpat back to 150mg BID.   Continue dexamethasone at current dose.   Considering hospice.   Cancelling ALL upcoming appts (MD visit, labs, infusions).    Shannen Hung MD  Neuro-oncology  3/19/2021

## 2021-03-21 NOTE — ORAL ONC MGMT
[facility] Oral Chemotherapy Monitoring Program    Thank you for the opportunity to be a part in the care of this patient's oral chemotherapy. The oncology pharmacy will no longer be following this patient for oral chemotherapy. If there are any questions or the plan changes, feel free to contact us.    ORAL CHEMOTHERAPY 10/5/2020 11/24/2020 12/8/2020 12/22/2020 1/5/2021 2/2/2021 3/21/2021   Assessment Type - Lab Monitoring Lab Monitoring Lab Monitoring Lab Monitoring Lab Monitoring Discontinuation   Stop Date - - - - - - 3/2/2021   Reason for Discontinuation - - - - - - Disease progression   Diagnosis Code - Brain Cancer - Glioblastoma Brain Cancer - Glioblastoma Brain Cancer - Glioblastoma Brain Cancer - Glioblastoma Brain Cancer - Glioblastoma Brain Cancer - Glioblastoma   Providers - Abhilash Hung   [facility]   Drug Name (No Data) Gleostine (Lomustine) Gleostine (Lomustine) Gleostine (Lomustine) Gleostine (Lomustine) Gleostine (Lomustine) Gleostine (lomustine)   Dose - 100 mg 100 mg 100 mg 100 mg 100 mg 100 mg   Current Schedule Daily Other Other Other Other Other Other   Cycle Details Other Other Other Other Other Other Other   Start Date of Last Cycle 9/25/2020 - - 11/19/2020 - 1/13/2021 -   Planned next cycle start date - - - - - 3/10/2021 -   Doses missed in last 2 weeks 0 - - - - - -   Adherence Assessment Adherent - - - - - -   Adverse Effects No AE identified during assessment - - - - - -   Home BPs - - - - - - -   Any new drug interactions? - - - - - - -   Is the dose as ordered appropriate for the patient? - - - - - - -   Is the patient currently in pain? - - - - - - -   Has the patient missed any days of school, work, or other routine activity? - - - - - - -       Beto Gudino Pharmacy Intern  Oral Chemotherapy Monitoring Program  [phone]

## 2021-03-30 NOTE — TELEPHONE ENCOUNTER
Jim called for refill of Dexamethasone.     Per Jim, reporting that on 3/19 spoke to Dr. Hung to take a totally of 8mg dexamethasone daily. (using the current tablets in home was given as 2mg tablets, giving two tablets in morning and two tablets in evening).     Per progress note by Sara Melgar on 3/17- family instructed to take- Dexamethasone dose to 4mg in morning and 4 mg at noon. (for total of 8mg/daily)    Name of medication: Dexamethasone 4mg   Date last filled: 3/12/21  How many days does pt have left?: 1 day only    Next follow-up visit: none    11:10am Paged/Routed to provider for review: Dr. Hung      Spoke to Mildred from [facility], 4mg tablets are small, size of baby aspirin and it is the only concentration they have in stock at this time.     Informed Jim about prescription update and sent.

## (undated) DEVICE — SPONGE COTTONOID 1X3" 20-10S

## (undated) DEVICE — SPONGE COTTONOID 1/2X1/2" 20-04S

## (undated) DEVICE — SURGICEL HEMOSTAT 2X3" 1953

## (undated) DEVICE — DRAPE CRANIOTOMY W/POUCH 9450

## (undated) DEVICE — COVER CAMERA VIDEO LASER 9X96" 04-CC227

## (undated) DEVICE — STRAP UNIVERSAL POSITIONING 2-PIECE 4X47X76" 91-287

## (undated) DEVICE — WIPES FOLEY CARE SURESTEP PROVON DFC100

## (undated) DEVICE — BLADE CLIPPER SGL USE 9680

## (undated) DEVICE — ADH SKIN CLOSURE PREMIERPRO EXOFIN 1.0ML 3470

## (undated) DEVICE — DRAPE SHEET REV FOLD 3/4 9349

## (undated) DEVICE — SPONGE COTTONOID 1/2X3" 20-07S

## (undated) DEVICE — BUR ROUTER 1.4X12.8MM ANSPACH S-1R

## (undated) DEVICE — NDL ANGIOCATH 14GA 1.25" 4048

## (undated) DEVICE — DRAPE MICROSCOPE LEICA 54X150" AR8033650

## (undated) DEVICE — PREP CHLORAPREP CLEAR 3ML 260400

## (undated) DEVICE — PREP POVIDONE IODINE SCRUB 7.5% 4OZ APL82212

## (undated) DEVICE — CATH TRAY FOLEY SURESTEP 16FR W/TMP PRB STLK LATEX A319416AM

## (undated) DEVICE — ESU CORD BIPOLAR AND IRR TUBING AESCULAP US355

## (undated) DEVICE — PREP POVIDONE IODINE SOLUTION 10% 4OZ

## (undated) DEVICE — SYR 03ML LL W/O NDL 309657

## (undated) DEVICE — SURGICEL HEMOSTAT 4X8" 1952

## (undated) DEVICE — ESU GROUND PAD ADULT W/CORD E7507

## (undated) DEVICE — SU MONOCRYL 3-0 PS-1 27" Y936H

## (undated) DEVICE — PAD CHUX UNDERPAD 23X24" 7136

## (undated) DEVICE — SOL WATER IRRIG 1000ML BOTTLE 2F7114

## (undated) DEVICE — SOL RINGERS LACTATED 1000ML BAG 07953-09

## (undated) DEVICE — MARKER SPHERES PASSIVE MEDT PACK 5 8801075

## (undated) DEVICE — ADH FLOSEAL W/HUMAN THROMBIN 5ML W/APPLICATOR TIP ADS201844

## (undated) DEVICE — SU VICRYL 2-0 CT-2 CR 8X18" J726D

## (undated) DEVICE — LINEN TOWEL PACK X6 WHITE 5487

## (undated) DEVICE — CATH TRAY FOLEY SURESTEP 16FR W/URNE MTR STLK LATEX A303316A

## (undated) DEVICE — CRANIOTOME ADULT ANSPACH A-CRN

## (undated) DEVICE — SPONGE COTTONOID 1/2X1 1/2" 20-06S

## (undated) DEVICE — PERFORATOR 14MM CODMAN

## (undated) DEVICE — SPONGE SURGIFOAM 100 1974

## (undated) DEVICE — SYR 30ML LL W/O NDL 302832

## (undated) DEVICE — RX BACITRACIN OINTMENT 0.9G 1/32OZ CUR001109

## (undated) DEVICE — PIN SKULL MAYFIELD ADULT TITANIUM 3/PK A1120

## (undated) DEVICE — SU NUROLON 4-0 TF CR 8X18" C584D

## (undated) DEVICE — PREP SKIN SCRUB TRAY 4461A

## (undated) DEVICE — LINEN TOWEL PACK X30 5481

## (undated) DEVICE — PACK CRANIOTOMY

## (undated) RX ORDER — DEXAMETHASONE SODIUM PHOSPHATE 4 MG/ML
INJECTION, SOLUTION INTRA-ARTICULAR; INTRALESIONAL; INTRAMUSCULAR; INTRAVENOUS; SOFT TISSUE
Status: DISPENSED
Start: 2019-10-01

## (undated) RX ORDER — MANNITOL 20 G/100ML
INJECTION, SOLUTION INTRAVENOUS
Status: DISPENSED
Start: 2019-10-01

## (undated) RX ORDER — HYDROMORPHONE HYDROCHLORIDE 1 MG/ML
INJECTION, SOLUTION INTRAMUSCULAR; INTRAVENOUS; SUBCUTANEOUS
Status: DISPENSED
Start: 2019-10-01

## (undated) RX ORDER — LIDOCAINE HYDROCHLORIDE 20 MG/ML
INJECTION, SOLUTION EPIDURAL; INFILTRATION; INTRACAUDAL; PERINEURAL
Status: DISPENSED
Start: 2019-10-01

## (undated) RX ORDER — GLYCOPYRROLATE 0.2 MG/ML
INJECTION, SOLUTION INTRAMUSCULAR; INTRAVENOUS
Status: DISPENSED
Start: 2019-10-01

## (undated) RX ORDER — PROPOFOL 10 MG/ML
INJECTION, EMULSION INTRAVENOUS
Status: DISPENSED
Start: 2019-10-01

## (undated) RX ORDER — FENTANYL CITRATE 50 UG/ML
INJECTION, SOLUTION INTRAMUSCULAR; INTRAVENOUS
Status: DISPENSED
Start: 2019-10-01

## (undated) RX ORDER — LIDOCAINE HYDROCHLORIDE 10 MG/ML
INJECTION, SOLUTION EPIDURAL; INFILTRATION; INTRACAUDAL; PERINEURAL
Status: DISPENSED
Start: 2020-01-01

## (undated) RX ORDER — CEFAZOLIN SODIUM 2 G/100ML
INJECTION, SOLUTION INTRAVENOUS
Status: DISPENSED
Start: 2019-10-01

## (undated) RX ORDER — PHENYLEPHRINE HCL IN 0.9% NACL 1 MG/10 ML
SYRINGE (ML) INTRAVENOUS
Status: DISPENSED
Start: 2019-10-01

## (undated) RX ORDER — BUPIVACAINE HYDROCHLORIDE AND EPINEPHRINE 2.5; 5 MG/ML; UG/ML
INJECTION, SOLUTION EPIDURAL; INFILTRATION; INTRACAUDAL; PERINEURAL
Status: DISPENSED
Start: 2019-10-01

## (undated) RX ORDER — EPHEDRINE SULFATE 50 MG/ML
INJECTION, SOLUTION INTRAMUSCULAR; INTRAVENOUS; SUBCUTANEOUS
Status: DISPENSED
Start: 2019-10-01

## (undated) RX ORDER — ONDANSETRON 2 MG/ML
INJECTION INTRAMUSCULAR; INTRAVENOUS
Status: DISPENSED
Start: 2019-10-01

## (undated) RX ORDER — BACITRACIN 50000 [IU]/1
INJECTION, POWDER, FOR SOLUTION INTRAMUSCULAR
Status: DISPENSED
Start: 2019-10-01

## (undated) RX ORDER — CEFAZOLIN SODIUM 1 G/3ML
INJECTION, POWDER, FOR SOLUTION INTRAMUSCULAR; INTRAVENOUS
Status: DISPENSED
Start: 2019-10-01

## (undated) RX ORDER — SODIUM CHLORIDE 9 MG/ML
INJECTION, SOLUTION INTRAVENOUS
Status: DISPENSED
Start: 2019-10-01

## (undated) RX ORDER — ESMOLOL HYDROCHLORIDE 10 MG/ML
INJECTION INTRAVENOUS
Status: DISPENSED
Start: 2019-10-01